# Patient Record
Sex: MALE | Race: WHITE
[De-identification: names, ages, dates, MRNs, and addresses within clinical notes are randomized per-mention and may not be internally consistent; named-entity substitution may affect disease eponyms.]

---

## 2018-10-23 ENCOUNTER — HOSPITAL ENCOUNTER (OUTPATIENT)
Age: 68
End: 2018-10-23
Payer: SELF-PAY

## 2018-10-23 DIAGNOSIS — K40.90: Primary | ICD-10-CM

## 2018-10-23 LAB
BACTERIA URNS QL MICRO: (no result) /LPF
COLOR UR: YELLOW
HYALINE CASTS URNS QL MICRO: (no result) #/LPF
MICRO URNS: (no result)
PH UR: 7 [PH] (ref 5–8.5)
SP GR UR: 1.02 (ref 1–1.03)
UROBILINOGEN UR QL: 0.2 EU/DL

## 2018-10-23 PROCEDURE — 81001 URINALYSIS AUTO W/SCOPE: CPT

## 2018-10-23 PROCEDURE — 93005 ELECTROCARDIOGRAM TRACING: CPT

## 2020-02-05 ENCOUNTER — HOSPITAL ENCOUNTER (OUTPATIENT)
Age: 70
End: 2020-02-05
Payer: MEDICARE

## 2020-02-05 DIAGNOSIS — I10: ICD-10-CM

## 2020-02-05 DIAGNOSIS — R00.2: Primary | ICD-10-CM

## 2020-02-05 DIAGNOSIS — Z85.46: ICD-10-CM

## 2020-02-05 DIAGNOSIS — R31.0: ICD-10-CM

## 2020-02-05 LAB
ALBUMIN LEVEL: 3.4 GM/DL (ref 3.4–5)
ALBUMIN/GLOB SERPL: 1.1 {RATIO} (ref 1.1–1.8)
ALP ISO SERPL-ACNC: 120 U/L (ref 46–116)
ALT SERPLBLD-CCNC: 26 U/L (ref 12–78)
ANION GAP SERPL CALC-SCNC: 12.6 MEQ/L (ref 5–15)
AST SERPL QL: 22 U/L (ref 15–37)
BILIRUBIN,TOTAL: 0.5 MG/DL (ref 0.2–1)
BUN SERPL-MCNC: 16 MG/DL (ref 7–18)
CALCIUM SPEC-MCNC: 8.1 MG/DL (ref 8.5–10.1)
CHLORIDE SPEC-SCNC: 104 MMOL/L (ref 98–107)
CHOLEST SPEC-SCNC: 216 MG/DL (ref 140–200)
CO2 SERPL-SCNC: 29 MMOL/L (ref 21–32)
CREAT BLD-SCNC: 1.1 MG/DL (ref 0.7–1.3)
ESTIMATED GLOMERULAR FILT RATE: 67 ML/MIN (ref 60–?)
GFR (AFRICAN AMERICAN): 81 ML/MIN (ref 60–?)
GLOBULIN SER CALC-MCNC: 3.1 GM/DL (ref 1.3–3.2)
GLUCOSE: 79 MG/DL (ref 74–106)
HCT VFR BLD CALC: 41.6 % (ref 42–52)
HDLC SERPL-MCNC: 60 MG/DL (ref 27–67)
HGB BLD-MCNC: 14.2 G/DL (ref 14.1–18)
MCHC RBC-ENTMCNC: 34.1 G/DL (ref 31.8–35.4)
MCV RBC: 89.5 FL (ref 80–94)
MEAN CORPUSCULAR HEMOGLOBIN: 30.5 PG (ref 27–31.2)
PLATELET # BLD: 250 K/MM3 (ref 142–424)
POTASSIUM: 3.6 MMOL/L (ref 3.5–5.1)
PROSTATE SPECIFIC AG, DIAGNOST: 0.16 NG/ML (ref 0–4)
PROT SERPL-MCNC: 6.5 GM/DL (ref 6.4–8.2)
RBC # BLD AUTO: 4.65 M/MM3 (ref 4.6–6.2)
SODIUM SPEC-SCNC: 142 MMOL/L (ref 136–145)
TRIGLYCERIDES: 63 MG/DL (ref 30–200)
TSH SERPL-ACNC: 2.51 UIU/ML (ref 0.36–3.74)
WBC # BLD AUTO: 5.9 K/MM3 (ref 4.8–10.8)

## 2020-02-05 PROCEDURE — 80053 COMPREHEN METABOLIC PANEL: CPT

## 2020-02-05 PROCEDURE — 84153 ASSAY OF PSA TOTAL: CPT

## 2020-02-05 PROCEDURE — 84443 ASSAY THYROID STIM HORMONE: CPT

## 2020-02-05 PROCEDURE — 80061 LIPID PANEL: CPT

## 2020-02-05 PROCEDURE — 36415 COLL VENOUS BLD VENIPUNCTURE: CPT

## 2020-02-05 PROCEDURE — 85025 COMPLETE CBC W/AUTO DIFF WBC: CPT

## 2021-06-11 ENCOUNTER — HOSPITAL ENCOUNTER (EMERGENCY)
Age: 71
Discharge: HOME | End: 2021-06-11
Payer: MEDICARE

## 2021-06-11 VITALS
DIASTOLIC BLOOD PRESSURE: 77 MMHG | OXYGEN SATURATION: 98 % | SYSTOLIC BLOOD PRESSURE: 135 MMHG | TEMPERATURE: 98.2 F | RESPIRATION RATE: 14 BRPM | HEART RATE: 62 BPM

## 2021-06-11 VITALS
RESPIRATION RATE: 18 BRPM | TEMPERATURE: 98.24 F | DIASTOLIC BLOOD PRESSURE: 65 MMHG | HEART RATE: 65 BPM | SYSTOLIC BLOOD PRESSURE: 115 MMHG

## 2021-06-11 VITALS — BODY MASS INDEX: 27.8 KG/M2
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
## 2021-06-11 DIAGNOSIS — T15.92XA: Primary | ICD-10-CM
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
## 2021-06-11 DIAGNOSIS — I10: ICD-10-CM

## 2021-06-11 PROCEDURE — 65205 REMOVE FOREIGN BODY FROM EYE: CPT

## 2021-06-11 PROCEDURE — 99282 EMERGENCY DEPT VISIT SF MDM: CPT

## 2021-11-02 ENCOUNTER — HOSPITAL ENCOUNTER (OUTPATIENT)
Age: 71
End: 2021-11-02
Payer: MEDICARE

## 2021-11-02 DIAGNOSIS — I10: Primary | ICD-10-CM

## 2021-11-02 DIAGNOSIS — E78.5: ICD-10-CM

## 2021-11-02 LAB
ALBUMIN LEVEL: 3.7 G/DL (ref 3.5–5)
ALBUMIN/GLOB SERPL: 1.3 {RATIO} (ref 1.1–1.8)
ALP ISO SERPL-ACNC: 109 U/L (ref 38–126)
ALT SERPLBLD-CCNC: 21 U/L (ref 12–78)
ANION GAP SERPL CALC-SCNC: 9.5 MEQ/L (ref 5–15)
AST SERPL QL: 30 U/L (ref 17–59)
BILIRUBIN,TOTAL: 0.5 MG/DL (ref 0.2–1.3)
BUN SERPL-MCNC: 19 MG/DL (ref 9–20)
CALCIUM SPEC-MCNC: 8.8 MG/DL (ref 8.4–10.2)
CHLORIDE SPEC-SCNC: 105 MMOL/L (ref 98–107)
CHOLEST SPEC-SCNC: 150 MG/DL (ref 140–200)
CO2 SERPL-SCNC: 29 MMOL/L (ref 22–30)
CREAT BLD-SCNC: 1 MG/DL (ref 0.66–1.25)
ESTIMATED GLOMERULAR FILT RATE: 74 ML/MIN (ref 60–?)
GFR (AFRICAN AMERICAN): 90 ML/MIN (ref 60–?)
GLOBULIN SER CALC-MCNC: 2.8 G/DL (ref 1.3–3.2)
GLUCOSE: 82 MG/DL (ref 74–100)
HDLC SERPL-MCNC: 56 MG/DL (ref 40–60)
POTASSIUM: 4.5 MMOL/L (ref 3.5–5.1)
PROT SERPL-MCNC: 6.5 G/DL (ref 6.3–8.2)
SODIUM SPEC-SCNC: 139 MMOL/L (ref 136–145)
TRIGLYCERIDES: 77 MG/DL (ref 30–150)

## 2021-11-02 PROCEDURE — 80053 COMPREHEN METABOLIC PANEL: CPT

## 2021-11-02 PROCEDURE — 36415 COLL VENOUS BLD VENIPUNCTURE: CPT

## 2021-11-02 PROCEDURE — 80061 LIPID PANEL: CPT

## 2024-06-09 ENCOUNTER — HOSPITAL ENCOUNTER (INPATIENT)
Facility: HOSPITAL | Age: 74
LOS: 6 days | Discharge: HOME | DRG: 871 | End: 2024-06-16
Attending: INTERNAL MEDICINE | Admitting: INTERNAL MEDICINE
Payer: MEDICARE

## 2024-06-09 ENCOUNTER — APPOINTMENT (OUTPATIENT)
Dept: CT IMAGING | Facility: HOSPITAL | Age: 74
DRG: 871 | End: 2024-06-09
Payer: MEDICARE

## 2024-06-09 DIAGNOSIS — I48.91 ATRIAL FIBRILLATION WITH RVR: Primary | ICD-10-CM

## 2024-06-09 DIAGNOSIS — R09.02 HYPOXIA: ICD-10-CM

## 2024-06-09 DIAGNOSIS — J18.9 PNEUMONIA OF LEFT LOWER LOBE DUE TO INFECTIOUS ORGANISM: ICD-10-CM

## 2024-06-09 DIAGNOSIS — N17.9 AKI (ACUTE KIDNEY INJURY): ICD-10-CM

## 2024-06-09 DIAGNOSIS — A41.9 SEPSIS, DUE TO UNSPECIFIED ORGANISM, UNSPECIFIED WHETHER ACUTE ORGAN DYSFUNCTION PRESENT: ICD-10-CM

## 2024-06-09 LAB
ALBUMIN SERPL-MCNC: 3.8 G/DL (ref 3.5–5.2)
ALBUMIN/GLOB SERPL: 1.1 G/DL
ALP SERPL-CCNC: 111 U/L (ref 39–117)
ALT SERPL W P-5'-P-CCNC: 14 U/L (ref 1–41)
ANION GAP SERPL CALCULATED.3IONS-SCNC: 12 MMOL/L (ref 5–15)
AST SERPL-CCNC: 19 U/L (ref 1–40)
BACTERIA UR QL AUTO: ABNORMAL /HPF
BASOPHILS # BLD MANUAL: 0 10*3/MM3 (ref 0–0.2)
BASOPHILS NFR BLD MANUAL: 0 % (ref 0–1.5)
BILIRUB SERPL-MCNC: 1.6 MG/DL (ref 0–1.2)
BILIRUB UR QL STRIP: NEGATIVE
BUN SERPL-MCNC: 35 MG/DL (ref 8–23)
BUN/CREAT SERPL: 25 (ref 7–25)
BURR CELLS BLD QL SMEAR: ABNORMAL
CALCIUM SPEC-SCNC: 8.8 MG/DL (ref 8.6–10.5)
CHLORIDE SERPL-SCNC: 99 MMOL/L (ref 98–107)
CLARITY UR: CLEAR
CO2 SERPL-SCNC: 23 MMOL/L (ref 22–29)
COARSE GRAN CASTS URNS QL MICRO: ABNORMAL /LPF
COLOR UR: ABNORMAL
CREAT SERPL-MCNC: 1.4 MG/DL (ref 0.76–1.27)
D-LACTATE SERPL-SCNC: 3.3 MMOL/L (ref 0.5–2)
DEPRECATED RDW RBC AUTO: 47.8 FL (ref 37–54)
DOHLE BOD BLD QL SMEAR: PRESENT
EGFRCR SERPLBLD CKD-EPI 2021: 53.7 ML/MIN/1.73
EOSINOPHIL # BLD MANUAL: 0 10*3/MM3 (ref 0–0.4)
EOSINOPHIL NFR BLD MANUAL: 0 % (ref 0.3–6.2)
ERYTHROCYTE [DISTWIDTH] IN BLOOD BY AUTOMATED COUNT: 14.1 % (ref 12.3–15.4)
GLOBULIN UR ELPH-MCNC: 3.5 GM/DL
GLUCOSE SERPL-MCNC: 122 MG/DL (ref 65–99)
GLUCOSE UR STRIP-MCNC: NEGATIVE MG/DL
HCT VFR BLD AUTO: 39.5 % (ref 37.5–51)
HGB BLD-MCNC: 13.1 G/DL (ref 13–17.7)
HGB UR QL STRIP.AUTO: ABNORMAL
HOLD SPECIMEN: NORMAL
HYALINE CASTS UR QL AUTO: ABNORMAL /LPF
KETONES UR QL STRIP: ABNORMAL
LEUKOCYTE ESTERASE UR QL STRIP.AUTO: ABNORMAL
LIPASE SERPL-CCNC: 20 U/L (ref 13–60)
LYMPHOCYTES # BLD MANUAL: 1 10*3/MM3 (ref 0.7–3.1)
LYMPHOCYTES NFR BLD MANUAL: 4 % (ref 5–12)
MCH RBC QN AUTO: 30.8 PG (ref 26.6–33)
MCHC RBC AUTO-ENTMCNC: 33.2 G/DL (ref 31.5–35.7)
MCV RBC AUTO: 92.7 FL (ref 79–97)
MONOCYTES # BLD: 1 10*3/MM3 (ref 0.1–0.9)
NEUTROPHILS # BLD AUTO: 22.94 10*3/MM3 (ref 1.7–7)
NEUTROPHILS NFR BLD MANUAL: 76.2 % (ref 42.7–76)
NEUTS BAND NFR BLD MANUAL: 15.8 % (ref 0–5)
NEUTS VAC BLD QL SMEAR: ABNORMAL
NITRITE UR QL STRIP: NEGATIVE
NRBC BLD AUTO-RTO: 0 /100 WBC (ref 0–0.2)
PH UR STRIP.AUTO: 5.5 [PH] (ref 5–8)
PLAT MORPH BLD: NORMAL
PLATELET # BLD AUTO: 200 10*3/MM3 (ref 140–450)
PMV BLD AUTO: 11.1 FL (ref 6–12)
POIKILOCYTOSIS BLD QL SMEAR: ABNORMAL
POLYCHROMASIA BLD QL SMEAR: ABNORMAL
POTASSIUM SERPL-SCNC: 4.6 MMOL/L (ref 3.5–5.2)
PROCALCITONIN SERPL-MCNC: 11.96 NG/ML (ref 0–0.25)
PROT SERPL-MCNC: 7.3 G/DL (ref 6–8.5)
PROT UR QL STRIP: ABNORMAL
RBC # BLD AUTO: 4.26 10*6/MM3 (ref 4.14–5.8)
RBC # UR STRIP: ABNORMAL /HPF
REF LAB TEST METHOD: ABNORMAL
SODIUM SERPL-SCNC: 134 MMOL/L (ref 136–145)
SP GR UR STRIP: 1.02 (ref 1–1.03)
SQUAMOUS #/AREA URNS HPF: ABNORMAL /HPF
UROBILINOGEN UR QL STRIP: ABNORMAL
VARIANT LYMPHS NFR BLD MANUAL: 1 % (ref 0–5)
VARIANT LYMPHS NFR BLD MANUAL: 3 % (ref 19.6–45.3)
WBC # UR STRIP: ABNORMAL /HPF
WBC NRBC COR # BLD AUTO: 24.91 10*3/MM3 (ref 3.4–10.8)
WHOLE BLOOD HOLD COAG: NORMAL
WHOLE BLOOD HOLD SPECIMEN: NORMAL

## 2024-06-09 PROCEDURE — 93010 ELECTROCARDIOGRAM REPORT: CPT | Performed by: INTERNAL MEDICINE

## 2024-06-09 PROCEDURE — 84145 PROCALCITONIN (PCT): CPT | Performed by: PHYSICIAN ASSISTANT

## 2024-06-09 PROCEDURE — 85007 BL SMEAR W/DIFF WBC COUNT: CPT | Performed by: EMERGENCY MEDICINE

## 2024-06-09 PROCEDURE — 25010000002 ONDANSETRON PER 1 MG: Performed by: PHYSICIAN ASSISTANT

## 2024-06-09 PROCEDURE — 25010000002 MORPHINE PER 10 MG: Performed by: EMERGENCY MEDICINE

## 2024-06-09 PROCEDURE — 99285 EMERGENCY DEPT VISIT HI MDM: CPT

## 2024-06-09 PROCEDURE — 25810000003 SODIUM CHLORIDE 0.9 % SOLUTION: Performed by: PHYSICIAN ASSISTANT

## 2024-06-09 PROCEDURE — 81001 URINALYSIS AUTO W/SCOPE: CPT | Performed by: EMERGENCY MEDICINE

## 2024-06-09 PROCEDURE — 93005 ELECTROCARDIOGRAM TRACING: CPT | Performed by: FAMILY MEDICINE

## 2024-06-09 PROCEDURE — 25510000001 IOPAMIDOL 61 % SOLUTION: Performed by: PHYSICIAN ASSISTANT

## 2024-06-09 PROCEDURE — 85025 COMPLETE CBC W/AUTO DIFF WBC: CPT | Performed by: EMERGENCY MEDICINE

## 2024-06-09 PROCEDURE — 74177 CT ABD & PELVIS W/CONTRAST: CPT

## 2024-06-09 PROCEDURE — 93005 ELECTROCARDIOGRAM TRACING: CPT | Performed by: EMERGENCY MEDICINE

## 2024-06-09 PROCEDURE — 80053 COMPREHEN METABOLIC PANEL: CPT | Performed by: EMERGENCY MEDICINE

## 2024-06-09 PROCEDURE — 83605 ASSAY OF LACTIC ACID: CPT | Performed by: EMERGENCY MEDICINE

## 2024-06-09 PROCEDURE — 83690 ASSAY OF LIPASE: CPT | Performed by: EMERGENCY MEDICINE

## 2024-06-09 RX ORDER — DILTIAZEM HYDROCHLORIDE 5 MG/ML
0.25 INJECTION INTRAVENOUS ONCE
Status: COMPLETED | OUTPATIENT
Start: 2024-06-09 | End: 2024-06-09

## 2024-06-09 RX ORDER — SODIUM CHLORIDE 0.9 % (FLUSH) 0.9 %
10 SYRINGE (ML) INJECTION AS NEEDED
Status: DISCONTINUED | OUTPATIENT
Start: 2024-06-09 | End: 2024-06-14 | Stop reason: SDUPTHER

## 2024-06-09 RX ORDER — FLECAINIDE ACETATE 100 MG/1
100 TABLET ORAL 2 TIMES DAILY
COMMUNITY

## 2024-06-09 RX ORDER — SODIUM CHLORIDE 0.9 % (FLUSH) 0.9 %
10 SYRINGE (ML) INJECTION AS NEEDED
Status: DISCONTINUED | OUTPATIENT
Start: 2024-06-09 | End: 2024-06-16 | Stop reason: HOSPADM

## 2024-06-09 RX ORDER — LISINOPRIL 10 MG/1
10 TABLET ORAL DAILY
COMMUNITY
End: 2024-06-16 | Stop reason: HOSPADM

## 2024-06-09 RX ORDER — ATORVASTATIN CALCIUM 40 MG/1
40 TABLET, FILM COATED ORAL NIGHTLY
COMMUNITY

## 2024-06-09 RX ORDER — ONDANSETRON 2 MG/ML
4 INJECTION INTRAMUSCULAR; INTRAVENOUS ONCE
Status: COMPLETED | OUTPATIENT
Start: 2024-06-09 | End: 2024-06-09

## 2024-06-09 RX ORDER — ACETAMINOPHEN 500 MG
1000 TABLET ORAL ONCE
Status: COMPLETED | OUTPATIENT
Start: 2024-06-09 | End: 2024-06-09

## 2024-06-09 RX ADMIN — ACETAMINOPHEN 1000 MG: 500 TABLET, FILM COATED ORAL at 22:52

## 2024-06-09 RX ADMIN — ONDANSETRON 4 MG: 2 INJECTION INTRAMUSCULAR; INTRAVENOUS at 22:35

## 2024-06-09 RX ADMIN — IOPAMIDOL 100 ML: 612 INJECTION, SOLUTION INTRAVENOUS at 23:34

## 2024-06-09 RX ADMIN — MORPHINE SULFATE 4 MG: 4 INJECTION, SOLUTION INTRAMUSCULAR; INTRAVENOUS at 22:35

## 2024-06-09 RX ADMIN — DILTIAZEM HYDROCHLORIDE 24.4 MG: 5 INJECTION INTRAVENOUS at 23:08

## 2024-06-09 RX ADMIN — SODIUM CHLORIDE 1000 ML: 9 INJECTION, SOLUTION INTRAVENOUS at 22:39

## 2024-06-10 ENCOUNTER — APPOINTMENT (OUTPATIENT)
Dept: CT IMAGING | Facility: HOSPITAL | Age: 74
DRG: 871 | End: 2024-06-10
Payer: MEDICARE

## 2024-06-10 PROBLEM — R93.5 ABNORMAL CT OF THE ABDOMEN: Status: ACTIVE | Noted: 2024-06-10

## 2024-06-10 PROBLEM — A41.9 SEPSIS: Status: ACTIVE | Noted: 2024-06-10

## 2024-06-10 PROBLEM — J18.9 PNEUMONIA INVOLVING LEFT LUNG: Status: ACTIVE | Noted: 2024-06-10

## 2024-06-10 PROBLEM — J96.01 ACUTE RESPIRATORY FAILURE WITH HYPOXIA: Status: ACTIVE | Noted: 2024-06-10

## 2024-06-10 PROBLEM — I48.20 CHRONIC ATRIAL FIBRILLATION WITH RAPID VENTRICULAR RESPONSE: Status: ACTIVE | Noted: 2024-06-10

## 2024-06-10 LAB
ANION GAP SERPL CALCULATED.3IONS-SCNC: 11 MMOL/L (ref 5–15)
B PARAPERT DNA SPEC QL NAA+PROBE: NOT DETECTED
B PERT DNA SPEC QL NAA+PROBE: NOT DETECTED
BACTERIA UR QL AUTO: ABNORMAL /HPF
BASOPHILS # BLD MANUAL: 0.2 10*3/MM3 (ref 0–0.2)
BASOPHILS NFR BLD MANUAL: 1 % (ref 0–1.5)
BILIRUB UR QL STRIP: NEGATIVE
BUN SERPL-MCNC: 33 MG/DL (ref 8–23)
BUN/CREAT SERPL: 25.2 (ref 7–25)
BURR CELLS BLD QL SMEAR: ABNORMAL
C PNEUM DNA NPH QL NAA+NON-PROBE: NOT DETECTED
CALCIUM SPEC-SCNC: 7.6 MG/DL (ref 8.6–10.5)
CHLORIDE SERPL-SCNC: 102 MMOL/L (ref 98–107)
CLARITY UR: CLEAR
CO2 SERPL-SCNC: 21 MMOL/L (ref 22–29)
COLOR UR: ABNORMAL
CREAT SERPL-MCNC: 1.31 MG/DL (ref 0.76–1.27)
D-LACTATE SERPL-SCNC: 2 MMOL/L (ref 0.5–2)
DEPRECATED RDW RBC AUTO: 47.8 FL (ref 37–54)
DOHLE BOD BLD QL SMEAR: PRESENT
EGFRCR SERPLBLD CKD-EPI 2021: 58.2 ML/MIN/1.73
EOSINOPHIL # BLD MANUAL: 0 10*3/MM3 (ref 0–0.4)
EOSINOPHIL NFR BLD MANUAL: 0 % (ref 0.3–6.2)
ERYTHROCYTE [DISTWIDTH] IN BLOOD BY AUTOMATED COUNT: 13.9 % (ref 12.3–15.4)
FLUAV SUBTYP SPEC NAA+PROBE: NOT DETECTED
FLUBV RNA ISLT QL NAA+PROBE: NOT DETECTED
GEN 5 2HR TROPONIN T REFLEX: 18 NG/L
GLUCOSE SERPL-MCNC: 122 MG/DL (ref 65–99)
GLUCOSE UR STRIP-MCNC: NEGATIVE MG/DL
GRAN CASTS URNS QL MICRO: ABNORMAL /LPF
HADV DNA SPEC NAA+PROBE: NOT DETECTED
HCOV 229E RNA SPEC QL NAA+PROBE: NOT DETECTED
HCOV HKU1 RNA SPEC QL NAA+PROBE: NOT DETECTED
HCOV NL63 RNA SPEC QL NAA+PROBE: NOT DETECTED
HCOV OC43 RNA SPEC QL NAA+PROBE: NOT DETECTED
HCT VFR BLD AUTO: 37.1 % (ref 37.5–51)
HGB BLD-MCNC: 12.2 G/DL (ref 13–17.7)
HGB UR QL STRIP.AUTO: ABNORMAL
HMPV RNA NPH QL NAA+NON-PROBE: NOT DETECTED
HPIV1 RNA ISLT QL NAA+PROBE: NOT DETECTED
HPIV2 RNA SPEC QL NAA+PROBE: NOT DETECTED
HPIV3 RNA NPH QL NAA+PROBE: NOT DETECTED
HPIV4 P GENE NPH QL NAA+PROBE: NOT DETECTED
HYALINE CASTS UR QL AUTO: ABNORMAL /LPF
INR PPP: 1.66 (ref 0.91–1.09)
KETONES UR QL STRIP: NEGATIVE
L PNEUMO1 AG UR QL IA: NEGATIVE
LEUKOCYTE ESTERASE UR QL STRIP.AUTO: NEGATIVE
LYMPHOCYTES # BLD MANUAL: 0.78 10*3/MM3 (ref 0.7–3.1)
LYMPHOCYTES NFR BLD MANUAL: 2 % (ref 5–12)
M PNEUMO IGG SER IA-ACNC: NOT DETECTED
MAGNESIUM SERPL-MCNC: 1.5 MG/DL (ref 1.6–2.4)
MCH RBC QN AUTO: 30.6 PG (ref 26.6–33)
MCHC RBC AUTO-ENTMCNC: 32.9 G/DL (ref 31.5–35.7)
MCV RBC AUTO: 93 FL (ref 79–97)
MONOCYTES # BLD: 0.39 10*3/MM3 (ref 0.1–0.9)
MRSA DNA SPEC QL NAA+PROBE: NORMAL
NEUTROPHILS # BLD AUTO: 18.23 10*3/MM3 (ref 1.7–7)
NEUTROPHILS NFR BLD MANUAL: 71 % (ref 42.7–76)
NEUTS BAND NFR BLD MANUAL: 22 % (ref 0–5)
NEUTS VAC BLD QL SMEAR: ABNORMAL
NITRITE UR QL STRIP: NEGATIVE
NRBC BLD AUTO-RTO: 0 /100 WBC (ref 0–0.2)
PH UR STRIP.AUTO: 5.5 [PH] (ref 5–8)
PHOSPHATE SERPL-MCNC: 2.5 MG/DL (ref 2.5–4.5)
PLAT MORPH BLD: NORMAL
PLATELET # BLD AUTO: 175 10*3/MM3 (ref 140–450)
PMV BLD AUTO: 11 FL (ref 6–12)
POIKILOCYTOSIS BLD QL SMEAR: ABNORMAL
POTASSIUM SERPL-SCNC: 4.3 MMOL/L (ref 3.5–5.2)
PROT UR QL STRIP: ABNORMAL
PROTHROMBIN TIME: 20.2 SECONDS (ref 11.8–14.8)
QT INTERVAL: 218 MS
QT INTERVAL: 364 MS
QTC INTERVAL: 374 MS
QTC INTERVAL: 419 MS
RBC # BLD AUTO: 3.99 10*6/MM3 (ref 4.14–5.8)
RBC # UR STRIP: ABNORMAL /HPF
REF LAB TEST METHOD: ABNORMAL
RHINOVIRUS RNA SPEC NAA+PROBE: NOT DETECTED
RSV RNA NPH QL NAA+NON-PROBE: NOT DETECTED
SARS-COV-2 RNA NPH QL NAA+NON-PROBE: NOT DETECTED
SODIUM SERPL-SCNC: 134 MMOL/L (ref 136–145)
SP GR UR STRIP: >1.03 (ref 1–1.03)
SQUAMOUS #/AREA URNS HPF: ABNORMAL /HPF
TROPONIN T DELTA: 1 NG/L
TROPONIN T SERPL HS-MCNC: 17 NG/L
UROBILINOGEN UR QL STRIP: ABNORMAL
VARIANT LYMPHS NFR BLD MANUAL: 4 % (ref 19.6–45.3)
WBC # UR STRIP: ABNORMAL /HPF
WBC NRBC COR # BLD AUTO: 19.6 10*3/MM3 (ref 3.4–10.8)

## 2024-06-10 PROCEDURE — 94799 UNLISTED PULMONARY SVC/PX: CPT

## 2024-06-10 PROCEDURE — 25010000002 PIPERACILLIN SOD-TAZOBACTAM PER 1 G: Performed by: PHYSICIAN ASSISTANT

## 2024-06-10 PROCEDURE — 25810000003 SODIUM CHLORIDE 0.9 % SOLUTION: Performed by: EMERGENCY MEDICINE

## 2024-06-10 PROCEDURE — 85610 PROTHROMBIN TIME: CPT | Performed by: NURSE PRACTITIONER

## 2024-06-10 PROCEDURE — 25810000003 SODIUM CHLORIDE 0.9 % SOLUTION 250 ML FLEX CONT: Performed by: NURSE PRACTITIONER

## 2024-06-10 PROCEDURE — 36415 COLL VENOUS BLD VENIPUNCTURE: CPT | Performed by: NURSE PRACTITIONER

## 2024-06-10 PROCEDURE — 25810000003 SODIUM CHLORIDE 0.9 % SOLUTION: Performed by: PHYSICIAN ASSISTANT

## 2024-06-10 PROCEDURE — 99221 1ST HOSP IP/OBS SF/LOW 40: CPT | Performed by: UROLOGY

## 2024-06-10 PROCEDURE — 81001 URINALYSIS AUTO W/SCOPE: CPT | Performed by: NURSE PRACTITIONER

## 2024-06-10 PROCEDURE — 87205 SMEAR GRAM STAIN: CPT | Performed by: NURSE PRACTITIONER

## 2024-06-10 PROCEDURE — 83605 ASSAY OF LACTIC ACID: CPT | Performed by: INTERNAL MEDICINE

## 2024-06-10 PROCEDURE — 25010000002 AZITHROMYCIN PER 500 MG: Performed by: NURSE PRACTITIONER

## 2024-06-10 PROCEDURE — 84484 ASSAY OF TROPONIN QUANT: CPT | Performed by: NURSE PRACTITIONER

## 2024-06-10 PROCEDURE — 84100 ASSAY OF PHOSPHORUS: CPT | Performed by: NURSE PRACTITIONER

## 2024-06-10 PROCEDURE — 25810000003 LACTATED RINGERS PER 1000 ML: Performed by: NURSE PRACTITIONER

## 2024-06-10 PROCEDURE — 85007 BL SMEAR W/DIFF WBC COUNT: CPT | Performed by: NURSE PRACTITIONER

## 2024-06-10 PROCEDURE — 85025 COMPLETE CBC W/AUTO DIFF WBC: CPT | Performed by: NURSE PRACTITIONER

## 2024-06-10 PROCEDURE — 87070 CULTURE OTHR SPECIMN AEROBIC: CPT | Performed by: NURSE PRACTITIONER

## 2024-06-10 PROCEDURE — 94761 N-INVAS EAR/PLS OXIMETRY MLT: CPT

## 2024-06-10 PROCEDURE — 0202U NFCT DS 22 TRGT SARS-COV-2: CPT | Performed by: NURSE PRACTITIONER

## 2024-06-10 PROCEDURE — 87449 NOS EACH ORGANISM AG IA: CPT | Performed by: NURSE PRACTITIONER

## 2024-06-10 PROCEDURE — 25010000002 MAGNESIUM SULFATE 2 GM/50ML SOLUTION: Performed by: NURSE PRACTITIONER

## 2024-06-10 PROCEDURE — 87641 MR-STAPH DNA AMP PROBE: CPT | Performed by: NURSE PRACTITIONER

## 2024-06-10 PROCEDURE — 25010000002 CEFTRIAXONE PER 250 MG: Performed by: NURSE PRACTITIONER

## 2024-06-10 PROCEDURE — 83735 ASSAY OF MAGNESIUM: CPT | Performed by: NURSE PRACTITIONER

## 2024-06-10 PROCEDURE — 71250 CT THORAX DX C-: CPT

## 2024-06-10 PROCEDURE — 25010000002 VANCOMYCIN 10 G RECONSTITUTED SOLUTION: Performed by: PHYSICIAN ASSISTANT

## 2024-06-10 PROCEDURE — 87040 BLOOD CULTURE FOR BACTERIA: CPT | Performed by: PHYSICIAN ASSISTANT

## 2024-06-10 PROCEDURE — 80048 BASIC METABOLIC PNL TOTAL CA: CPT | Performed by: NURSE PRACTITIONER

## 2024-06-10 RX ORDER — ACETAMINOPHEN 325 MG/1
650 TABLET ORAL EVERY 4 HOURS PRN
Status: DISCONTINUED | OUTPATIENT
Start: 2024-06-10 | End: 2024-06-16 | Stop reason: HOSPADM

## 2024-06-10 RX ORDER — VANCOMYCIN 2 GRAM/500 ML IN 0.9 % SODIUM CHLORIDE INTRAVENOUS
20 ONCE
Status: COMPLETED | OUTPATIENT
Start: 2024-06-10 | End: 2024-06-10

## 2024-06-10 RX ORDER — ONDANSETRON 2 MG/ML
4 INJECTION INTRAMUSCULAR; INTRAVENOUS EVERY 6 HOURS PRN
Status: DISCONTINUED | OUTPATIENT
Start: 2024-06-10 | End: 2024-06-16 | Stop reason: HOSPADM

## 2024-06-10 RX ORDER — BISACODYL 10 MG
10 SUPPOSITORY, RECTAL RECTAL DAILY PRN
Status: DISCONTINUED | OUTPATIENT
Start: 2024-06-10 | End: 2024-06-16 | Stop reason: HOSPADM

## 2024-06-10 RX ORDER — MAGNESIUM SULFATE HEPTAHYDRATE 40 MG/ML
2 INJECTION, SOLUTION INTRAVENOUS
Status: COMPLETED | OUTPATIENT
Start: 2024-06-10 | End: 2024-06-10

## 2024-06-10 RX ORDER — UBIDECARENONE 100 MG
100 CAPSULE ORAL DAILY
COMMUNITY
End: 2024-06-16 | Stop reason: HOSPADM

## 2024-06-10 RX ORDER — ASCORBIC ACID 500 MG
500 TABLET ORAL DAILY
COMMUNITY

## 2024-06-10 RX ORDER — SODIUM CHLORIDE, SODIUM LACTATE, POTASSIUM CHLORIDE, CALCIUM CHLORIDE 600; 310; 30; 20 MG/100ML; MG/100ML; MG/100ML; MG/100ML
100 INJECTION, SOLUTION INTRAVENOUS CONTINUOUS
Status: DISCONTINUED | OUTPATIENT
Start: 2024-06-10 | End: 2024-06-14

## 2024-06-10 RX ORDER — SODIUM PHOSPHATE,MONO-DIBASIC 19G-7G/118
1 ENEMA (ML) RECTAL 2 TIMES DAILY WITH MEALS
COMMUNITY
End: 2024-06-16 | Stop reason: HOSPADM

## 2024-06-10 RX ORDER — MELATONIN
1000 DAILY
COMMUNITY

## 2024-06-10 RX ORDER — NITROGLYCERIN 0.4 MG/1
0.4 TABLET SUBLINGUAL
Status: DISCONTINUED | OUTPATIENT
Start: 2024-06-10 | End: 2024-06-16 | Stop reason: HOSPADM

## 2024-06-10 RX ORDER — CHLORHEXIDINE GLUCONATE 500 MG/1
1 CLOTH TOPICAL ONCE
Status: COMPLETED | OUTPATIENT
Start: 2024-06-10 | End: 2024-06-10

## 2024-06-10 RX ORDER — VANCOMYCIN/0.9 % SOD CHLORIDE 1.5G/250ML
1500 PLASTIC BAG, INJECTION (ML) INTRAVENOUS EVERY 24 HOURS
Status: DISCONTINUED | OUTPATIENT
Start: 2024-06-10 | End: 2024-06-10

## 2024-06-10 RX ORDER — ACETAMINOPHEN 650 MG/1
650 SUPPOSITORY RECTAL EVERY 4 HOURS PRN
Status: DISCONTINUED | OUTPATIENT
Start: 2024-06-10 | End: 2024-06-16 | Stop reason: HOSPADM

## 2024-06-10 RX ORDER — METOPROLOL TARTRATE 50 MG/1
50 TABLET, FILM COATED ORAL EVERY 12 HOURS SCHEDULED
Status: DISCONTINUED | OUTPATIENT
Start: 2024-06-10 | End: 2024-06-12

## 2024-06-10 RX ORDER — SODIUM CHLORIDE 0.9 % (FLUSH) 0.9 %
10 SYRINGE (ML) INJECTION AS NEEDED
Status: DISCONTINUED | OUTPATIENT
Start: 2024-06-10 | End: 2024-06-14 | Stop reason: SDUPTHER

## 2024-06-10 RX ORDER — BISACODYL 5 MG/1
5 TABLET, DELAYED RELEASE ORAL DAILY PRN
Status: DISCONTINUED | OUTPATIENT
Start: 2024-06-10 | End: 2024-06-16 | Stop reason: HOSPADM

## 2024-06-10 RX ORDER — DILTIAZEM HCL/D5W 125 MG/125
5-15 PLASTIC BAG, INJECTION (ML) INTRAVENOUS
Status: DISCONTINUED | OUTPATIENT
Start: 2024-06-10 | End: 2024-06-13

## 2024-06-10 RX ORDER — SODIUM CHLORIDE 0.9 % (FLUSH) 0.9 %
10 SYRINGE (ML) INJECTION EVERY 12 HOURS SCHEDULED
Status: DISCONTINUED | OUTPATIENT
Start: 2024-06-10 | End: 2024-06-16 | Stop reason: HOSPADM

## 2024-06-10 RX ORDER — AMOXICILLIN 250 MG
2 CAPSULE ORAL 2 TIMES DAILY
Status: DISCONTINUED | OUTPATIENT
Start: 2024-06-10 | End: 2024-06-16 | Stop reason: HOSPADM

## 2024-06-10 RX ORDER — SODIUM CHLORIDE 9 MG/ML
40 INJECTION, SOLUTION INTRAVENOUS AS NEEDED
Status: DISCONTINUED | OUTPATIENT
Start: 2024-06-10 | End: 2024-06-16 | Stop reason: HOSPADM

## 2024-06-10 RX ORDER — METOPROLOL TARTRATE 50 MG/1
50 TABLET, FILM COATED ORAL ONCE
Status: COMPLETED | OUTPATIENT
Start: 2024-06-10 | End: 2024-06-10

## 2024-06-10 RX ORDER — ATORVASTATIN CALCIUM 40 MG/1
40 TABLET, FILM COATED ORAL NIGHTLY
Status: DISCONTINUED | OUTPATIENT
Start: 2024-06-10 | End: 2024-06-16 | Stop reason: HOSPADM

## 2024-06-10 RX ORDER — PANTOPRAZOLE SODIUM 40 MG/1
40 TABLET, DELAYED RELEASE ORAL
Status: DISCONTINUED | OUTPATIENT
Start: 2024-06-10 | End: 2024-06-16 | Stop reason: HOSPADM

## 2024-06-10 RX ORDER — POLYETHYLENE GLYCOL 3350 17 G/17G
17 POWDER, FOR SOLUTION ORAL DAILY PRN
Status: DISCONTINUED | OUTPATIENT
Start: 2024-06-10 | End: 2024-06-16 | Stop reason: HOSPADM

## 2024-06-10 RX ORDER — CHLORHEXIDINE GLUCONATE 500 MG/1
1 CLOTH TOPICAL EVERY 24 HOURS
Status: DISCONTINUED | OUTPATIENT
Start: 2024-06-11 | End: 2024-06-13 | Stop reason: HOSPADM

## 2024-06-10 RX ADMIN — MAGNESIUM SULFATE HEPTAHYDRATE 2 G: 2 INJECTION, SOLUTION INTRAVENOUS at 04:42

## 2024-06-10 RX ADMIN — MAGNESIUM SULFATE HEPTAHYDRATE 2 G: 2 INJECTION, SOLUTION INTRAVENOUS at 06:36

## 2024-06-10 RX ADMIN — MAGNESIUM SULFATE HEPTAHYDRATE 2 G: 2 INJECTION, SOLUTION INTRAVENOUS at 09:05

## 2024-06-10 RX ADMIN — METOPROLOL TARTRATE 50 MG: 50 TABLET, FILM COATED ORAL at 20:02

## 2024-06-10 RX ADMIN — Medication 10 ML: at 20:02

## 2024-06-10 RX ADMIN — Medication 10 ML: at 09:06

## 2024-06-10 RX ADMIN — Medication 1 APPLICATION: at 09:05

## 2024-06-10 RX ADMIN — ATORVASTATIN CALCIUM 40 MG: 40 TABLET ORAL at 20:01

## 2024-06-10 RX ADMIN — Medication 1 APPLICATION: at 20:02

## 2024-06-10 RX ADMIN — SODIUM CHLORIDE, POTASSIUM CHLORIDE, SODIUM LACTATE AND CALCIUM CHLORIDE 100 ML/HR: 600; 310; 30; 20 INJECTION, SOLUTION INTRAVENOUS at 06:37

## 2024-06-10 RX ADMIN — SENNOSIDES AND DOCUSATE SODIUM 2 TABLET: 50; 8.6 TABLET ORAL at 20:02

## 2024-06-10 RX ADMIN — APIXABAN 5 MG: 5 TABLET, FILM COATED ORAL at 20:01

## 2024-06-10 RX ADMIN — METOPROLOL TARTRATE 50 MG: 50 TABLET, FILM COATED ORAL at 09:06

## 2024-06-10 RX ADMIN — APIXABAN 5 MG: 5 TABLET, FILM COATED ORAL at 09:06

## 2024-06-10 RX ADMIN — CHLORHEXIDINE GLUCONATE 1 APPLICATION: 500 CLOTH TOPICAL at 02:48

## 2024-06-10 RX ADMIN — Medication 1 APPLICATION: at 02:53

## 2024-06-10 RX ADMIN — METOPROLOL TARTRATE 50 MG: 50 TABLET, FILM COATED ORAL at 02:53

## 2024-06-10 RX ADMIN — PIPERACILLIN SODIUM AND TAZOBACTAM SODIUM 3.38 G: 3; .375 INJECTION, POWDER, LYOPHILIZED, FOR SOLUTION INTRAVENOUS at 02:53

## 2024-06-10 RX ADMIN — SODIUM CHLORIDE 2000 MG: 9 INJECTION, SOLUTION INTRAVENOUS at 01:26

## 2024-06-10 RX ADMIN — CEFTRIAXONE SODIUM 2000 MG: 2 INJECTION, POWDER, FOR SOLUTION INTRAMUSCULAR; INTRAVENOUS at 07:50

## 2024-06-10 RX ADMIN — Medication 5 MG/HR: at 00:51

## 2024-06-10 RX ADMIN — AZITHROMYCIN MONOHYDRATE 500 MG: 500 INJECTION, POWDER, LYOPHILIZED, FOR SOLUTION INTRAVENOUS at 09:05

## 2024-06-10 RX ADMIN — PANTOPRAZOLE SODIUM 40 MG: 40 TABLET, DELAYED RELEASE ORAL at 06:00

## 2024-06-10 RX ADMIN — Medication 10 ML: at 02:49

## 2024-06-10 RX ADMIN — SODIUM CHLORIDE 1000 ML: 9 INJECTION, SOLUTION INTRAVENOUS at 00:18

## 2024-06-10 NOTE — ED PROVIDER NOTES
Subjective   History of Present Illness    Patient is a 73-year-old male presenting to ED with left sided abdominal pain, nausea, vomiting.  PMH significant for long-term use Eliquis, history of atrial fibrillation, hypertension, hernia repair, history of previous kidney stones.  Patient states yesterday he developed shaking chills and mild nausea as well as a slight left flank pain.  Patient reports that today his symptoms significantly increased and the pain now feels like with breath that radiates towards his left shoulder blade and is now radiating towards his left lower quadrant.  Patient denies any radiation of the pain into his testicles, right-sided abdominal or flank pain.  Patient denies dysuria, hematuria, or decreased urination.  Patient has continued to have chills today with no fevers or diaphoresis.  Patient started having nausea and vomiting as well as increasing waves of intense pain which she can no longer tolerate for which she presents for further evaluation.  Patient states many decades ago he had a history of 2 episodes of kidney stones which he passed on his own with no lithotripsies or stents and does not currently follow with a urologist.  Patient denies use of any medications prior to arrival and presents at this time for further evaluation.  Patient did state that he is currently visiting a friend in the area.    Records reviewed show patient was last seen outpatient at the PCP office on 4/1/2024 for dyslipidemia, paroxysmal A-fib, chest tightness, hypertension.    No previous ED visits.    No previous abdominal/pelvic imaging.    Review of Systems   Constitutional:  Positive for chills. Negative for diaphoresis and fever.   HENT: Negative.     Eyes: Negative.    Respiratory: Negative.     Cardiovascular: Negative.    Gastrointestinal:  Positive for abdominal pain (LLQ), nausea and vomiting. Negative for constipation and diarrhea.        Denies hematemesis   Genitourinary:  Positive for  flank pain (left). Negative for decreased urine volume, difficulty urinating, dysuria, hematuria and testicular pain.   Skin: Negative.    Neurological: Negative.    Psychiatric/Behavioral: Negative.     All other systems reviewed and are negative.      Past Medical History:   Diagnosis Date    Atrial fibrillation     Hypertension        No Known Allergies    Past Surgical History:   Procedure Laterality Date    HERNIA REPAIR         History reviewed. No pertinent family history.    Social History     Socioeconomic History    Marital status:    Tobacco Use    Smoking status: Never   Substance and Sexual Activity    Alcohol use: Never    Drug use: Never           Objective   Physical Exam  Vitals and nursing note reviewed.   Constitutional:       General: He is in acute distress (Appears to be uncomfortable due to pain, restless and pacing around room).      Appearance: Normal appearance. He is well-developed and well-groomed. He is not ill-appearing, toxic-appearing or diaphoretic.   HENT:      Head: Normocephalic.      Mouth/Throat:      Mouth: Mucous membranes are moist.      Pharynx: Oropharynx is clear.   Eyes:      General: No scleral icterus.     Conjunctiva/sclera: Conjunctivae normal.      Pupils: Pupils are equal, round, and reactive to light.   Cardiovascular:      Rate and Rhythm: Regular rhythm. Tachycardia present.   Pulmonary:      Effort: Pulmonary effort is normal.      Breath sounds: Normal breath sounds.   Abdominal:      General: Bowel sounds are normal. There is no distension.      Palpations: Abdomen is soft.      Tenderness: There is no abdominal tenderness. There is left CVA tenderness. There is no right CVA tenderness or guarding.   Musculoskeletal:         General: Normal range of motion.      Cervical back: Neck supple.      Right lower leg: No edema.      Left lower leg: No edema.   Skin:     General: Skin is warm.      Coloration: Skin is not jaundiced.   Neurological:      Mental  Status: He is alert and oriented to person, place, and time.      Gait: Gait normal.   Psychiatric:         Mood and Affect: Mood normal.         Behavior: Behavior normal. Behavior is cooperative.         Procedures           ED Course                                             Medical Decision Making  Problems Addressed:  KYRA (acute kidney injury): complicated acute illness or injury  Atrial fibrillation with RVR: complicated acute illness or injury  Hypoxia: complicated acute illness or injury  Pneumonia of left lower lobe due to infectious organism: complicated acute illness or injury  Sepsis, due to unspecified organism, unspecified whether acute organ dysfunction present: complicated acute illness or injury    Amount and/or Complexity of Data Reviewed  External Data Reviewed: labs, radiology and notes.  Labs: ordered. Decision-making details documented in ED Course.  Radiology: ordered. Decision-making details documented in ED Course.  ECG/medicine tests: ordered. Decision-making details documented in ED Course.  Discussion of management or test interpretation with external provider(s): Dr. Emile Hernandez (attending)  Amberly PAKR (intensivist)    Risk  OTC drugs.  Prescription drug management.  Decision regarding hospitalization.        Patient is a 73-year-old male presenting to ED with left sided abdominal pain, nausea, vomiting.  PMH significant for long-term use Eliquis, history of atrial fibrillation, hypertension, hernia repair, history of previous kidney stones.  Upon initial evaluation patient is standing and pacing in the room restless due to pain for which he is guarding on his left flank region.  Patient is nontoxic-appearing, nondiaphoretic.  Examination finds reproducible left CVAT with no right CVAT.  Anterior abdomen is soft, nontender, nondistended with normal bowel sounds.  Patient is tachycardic with no other acute abnormal exam findings.  No evidence of jaundice, scleral  icterus.  Normal posterior oropharynx.  Discussed with patient need for workup to include labs, urinalysis, CT imaging and ability to control his pain with IV medications for which she is amenable with no further questions, concerns, needs at this time.    Differential diagnosis: Renal stone, ureteral stone, KYRA, urinary tract infection, pyelonephritis, sepsis, systemic infection, diverticulitis, diverticulosis, colitis, bowel obstruction, constipation, mesenteric ischemia, mesenteric adenitis, other    Lab work revealed leukocytosis of 24.91.  Elevated relative bands at 15.8%, ANC 22.94.  Stable H&H, normal platelets no further CBC abnormalities.  Further concern for systemic process with lactic acid 3.3 for which patient received IV hydration.  Procalcitonin with further concern for systemic bacterial process elevated 11.96.  Blood cultures were obtained.  Low concern for pancreatic abnormality such as pancreatitis with normal lipase.  CMP revealed total bilirubin 1.6, creatinine 1.4 with BUN 35, GFR 53.7.  No electrolyte disturbances otherwise.  No further hepatic dysfunction.  Urinalysis with trace leukocytes, trace bacteria, 3-5 WBC and negative nitrites.  A culture was sent.  CT imaging of the abdomen and pelvis showed: Mild fullness of both renal collecting systems without ureteral stone or hydroureter, findings may reflect baseline, splenic granuloma, small and large bowel are normal, no free air or free fluid is present, dense left lower lobe consolidation compatible with severe pneumonia, small associated parapneumonic effusion suspected.  While in the ED awaiting evaluation patient went into A-fib with RVR for which she was given an initial 0.25 mg/kg dose of diltiazem with no improvement in his heart rate for which she was then started on a drip.  Patient was initially started on vancomycin and Zosyn.  Patient did throughout evaluation also required placement on 2 L of nasal cannula oxygen for which he  has never used home oxygen.  Discussed with patient need for admission for further evaluation and treatment for which she is amenable with no further questions, concerns, or needs at this time.  Case was discussed with Ms. XAVIER Saavedra on-call for intensivist who will come to except patient under admission to the services of Dr. Abraham with request for chest CT without contrast prior to admission.       Final diagnoses:   Atrial fibrillation with RVR   Sepsis, due to unspecified organism, unspecified whether acute organ dysfunction present   KYRA (acute kidney injury)   Pneumonia of left lower lobe due to infectious organism   Hypoxia       ED Disposition  ED Disposition       ED Disposition   Decision to Admit    Condition   --    Comment   Level of Care: Critical Care [6]   Diagnosis: Sepsis [9102166]   Admitting Physician: DENISE ABRAHAM [[id number]]   Certification: I Certify That Inpatient Hospital Services Are Medically Necessary For Greater Than 2 Midnights                 No follow-up provider specified.       Medication List      No changes were made to your prescriptions during this visit.            Nicolas Meraz PA-C  06/10/24 0212

## 2024-06-10 NOTE — CASE MANAGEMENT/SOCIAL WORK
Discharge Planning Assessment  [facility]     Patient Name: Rome Justice  MRN: [id number]  Today's Date: 6/10/2024    Admit Date: 6/9/2024        Discharge Needs Assessment       Row Name 06/10/24 1041       Living Environment    People in Home alone    Current Living Arrangements home    Primary Care Provided by self    Provides Primary Care For no one    Family Caregiver if Needed sibling(s)    Family Caregiver Names Alicia    Able to Return to Prior Arrangements yes       Resource/Environmental Concerns    Resource/Environmental Concerns none       Transition Planning    Patient/Family Anticipates Transition to home with family    Transportation Anticipated family or friend will provide       Discharge Needs Assessment    Readmission Within the Last 30 Days no previous admission in last 30 days    Equipment Currently Used at Home none    Concerns to be Addressed no discharge needs identified    Equipment Needed After Discharge none    Discharge Coordination/Progress spoke to patient who lives alone and is independent at home prior to illness; has a sister that lives nearby able to assist him if needed; has RX coverage and PCP; will follow for DC needs                   Discharge Plan    No documentation.                 Continued Care and Services - Admitted Since 6/9/2024    No active coordination exists for this encounter.          Demographic Summary    No documentation.                  Functional Status    No documentation.                  Psychosocial    No documentation.                  Abuse/Neglect    No documentation.                  Legal    No documentation.                  Substance Abuse    No documentation.                  Patient Forms    No documentation.                     Annemarie Childers RN

## 2024-06-10 NOTE — PROGRESS NOTES
Pharmacy Review Antimicrobial Stewardship     Current Antimicrobials:   Pharmacy to dose vancomycin  Pharmacy to Dose Zosyn  piperacillin-tazobactam (ZOSYN) 3.375 g IVPB in 100 mL NS MBP (CD)  Vancomycin HCl in NaCl solution - 1.5-0.9 GM/500ML-%    Indication(s): Pneumonia/Sepsis  Days of Therapy: 1 of 5    Is the therapy & duration appropriate based on evidence-based guidelines?: appropriate    Assessment/Action: Zosyn 3.375 g every 8 hours via extended infusion in combination with vancomycin.     Osvaldo Lowery PharmD  06/10/24 03:30 CDT      Subjective:   Diagnosis:   1. Atrial fibrillation with RVR    2. Sepsis, due to unspecified organism, unspecified whether acute organ dysfunction present    3. KYRA (acute kidney injury)    4. Pneumonia of left lower lobe due to infectious organism    5. Hypoxia         Allergies:  Allergies as of 06/09/2024    (No Known Allergies)       Objective:  Current Antimicrobial Medications:   Anti-Infectives (From admission, onward)      Ordered     Dose/Rate Route Frequency Start Stop    06/10/24 0319  vancomycin IVPB 1500 mg in 0.9% NaCl (Premix) 500 mL        Ordering Provider: Anny Chappell APRN    1,500 mg  333.3 mL/hr over 90 Minutes Intravenous Every 24 Hours 06/10/24 2200 06/15/24 2159    06/10/24 0319  piperacillin-tazobactam (ZOSYN) 3.375 g IVPB in 100 mL NS MBP (CD)        Ordering Provider: Anny Chappell APRN    3.375 g  over 4 Hours Intravenous Every 8 Hours 06/10/24 0900 06/15/24 0859    06/10/24 0302  Pharmacy to Dose Zosyn        Ordering Provider: Anny Chappell APRN     Does not apply Continuous PRN 06/10/24 0302 06/15/24 0301    06/10/24 0302  Pharmacy to dose vancomycin        Ordering Provider: Anny Chappell APRN     Does not apply Continuous PRN 06/10/24 0301 06/15/24 0300    06/10/24 0050  piperacillin-tazobactam (ZOSYN) 3.375 g IVPB in 100 mL NS MBP (CD)        Ordering Provider: Nicolas Meraz PA-C    3.375 g  over 30 Minutes Intravenous  "Once 06/10/24 0106 06/10/24 0323    06/10/24 0050  vancomycin IVPB 2000 mg in 0.9% Sodium Chloride 500 mL        Ordering Provider: Nicolas Meraz PA-C    20 mg/kg × 97.5 kg  250 mL/hr over 120 Minutes Intravenous Once 06/10/24 0106 06/10/24 0326            Culture Results:  No results found for: \"MRSAPCR\", \"BLOODCX\", \"BCIDPCR\", \"URINECX\", \"WOUNDCX\", \"RESPCX\", \"RVP\"    Microbiology Results (last 10 days)       ** No results found for the last 240 hours. **            Lab Results   Component Value Date    WBC 19.60 (H) 06/10/2024    WBC 24.91 (H) 06/09/2024     Temp Readings from Last 1 Encounters:   06/10/24 98.3 °F (36.8 °C) (Oral)      "

## 2024-06-10 NOTE — H&P
[facility]   HISTORY AND PHYSICAL    Patient Name: Rome Justice  : 1950  MRN: [id number]  Primary Care Physician:  Provider, No Known  Date of admission: 2024    Subjective   Subjective     Chief Complaint: Sepsis secondary to left-sided pneumonia    History of Present Illness  73-year-old male patient past medical history atrial fibrillation, anticoagulated on Eliquis, hypertension and hyperlipidemia presented to the emergency department for not feeling well, rigors and abdominal pain.  He does have a past medical history of renal calculus and initially felt he had a kidney stone and therefore sought medical attention.  Labs are concerning for sepsis by elevated WBC, lactate and Pro-Teodoro.  CT of the abdomen and pelvis revealed a full collecting renal system noted bilaterally without any obstructive uropathy and left lower lobe pneumonia.  Sepsis protocol implemented by the ED provider, IV fluid resuscitation with 30 mg/kg followed by empiric antibiotics with vancomycin and Zosyn after obtaining blood cultures.  During the hospitalization he went into A-fib with RVR.  Past medical history of A-fib with rates in the 150s.  He was given Cardizem bolus which was ineffective and then placed on a Cardizem drip.  Blood pressure soft after being placed on Cardizem drip.    Intensivist services was consulted for sepsis, felt to be secondary to pneumonia, A-fib with RVR and mild hypotension.  He was placed on supplemental oxygen 2 L per nasal cannula to maintain O2 saturations greater than 90%.  No home oxygen requirements.    I have seen and evaluated the patient in ER room 10.  The patient is alert, oriented and cognitively intact.  He does not appear to be in any acute distress.  He is currently in Trendelenburg position with a blood pressure 90 systolic and MAP in the 80s.  Heart rate 154.  A-fib per cardiac monitor.  He denies any chest pain or shortness of breath.  Endorses a longstanding history of  A-fib with a recent hospitalization in January for adequate rate control.    He reports he is currently visiting the area as he [place] in [place] near [place].  He reports he has not had any systemic symptoms other than today as he describes rigors, not feeling well and back pain.  He reports a past medical history of kidney stones and felt this could kidney stones therefore he sought medical attention.    He denies any preceding cough or chest congestion or fevers.  He does further endorse vomiting today however no diarrhea.  No sick contacts.  He reports in January he was hospitalized for A-fib with RVR as he spent 3 to 4 days in the ICU trying to regulate his heart rate.  He reports he placed him on new medications for rate control.  He reports he was placed on flecainide and his metoprolol was increased from 25 twice daily to 50 twice daily.  He reports today he has not taking any of his medications secondary to not feeling well, vomiting and he was at a car show today and did not have his medications handy.    I viewed his laboratory studies and they are concerning, WBC 24,000, 76% neutrophils, 15 bands and 22 TOMASA.  Glucose 122, BUN 35, creatinine 1.4, sodium 134, bili 1.6, GFR 53.  Pro-Teodoro 11.9.  Initial lactate 3 point 3 repeat lactate after IV fluid resuscitation normalized to 2.  Urinalysis shows orange color, moderate amount of blood, protein, leukoesterase, ketones, microscopic showed 3-5 WBCs and trace bacteria.      CT abdomen and pelvis with contrast shows mild fullness of both renal collecting systems without ureteral stone or hydroureter.  Findings may reflect baseline.  If symptoms of renal colic are present differential would include bilateral vesicular ureteral reflux although the urinary bladder is currently nondistended.  Further GI evaluation may be necessary.  Splenic granuloma noted.  The small and large bowel are normal.  No free air or free fluid.  Dense left lower lobe consolidation  compatible with severe pneumonia.  Small associated parapneumonic effusion suspected.    We will admit the patient to acute inpatient as I feel he requires ICU admission his blood pressures are soft, A-fib with RVR in the setting of sepsis secondary to pneumonia.  I did request a CT of the chest as there is no x-ray during this admission I feel that advanced imaging of the chest would be beneficial in further diagnosis of his pneumonia and warranted.    Abdominal Pain  Associated symptoms include nausea and vomiting.   Vomiting   Associated symptoms include abdominal pain.       Review of Systems   Constitutional:         Rigors   Gastrointestinal:  Positive for abdominal pain, nausea and vomiting.   All other systems reviewed and are negative.       Personal History     Past Medical History:   Diagnosis Date   • Atrial fibrillation    • Hypertension        Past Surgical History:   Procedure Laterality Date   • HERNIA REPAIR         Family History: family history is not on file. Otherwise pertinent FHx was reviewed and not pertinent to current issue.    Social History:  reports that he has never smoked. He has never used smokeless tobacco. He reports that he does not drink alcohol and does not use drugs.    Home Medications:  apixaban, atorvastatin, flecainide, lisinopril, and metoprolol tartrate    Allergies:  No Known Allergies    Objective    Objective     Vitals:   Temp:  [98.1 °F (36.7 °C)-100.3 °F (37.9 °C)] 98.3 °F (36.8 °C)  Heart Rate:  [] 133  Resp:  [16] 16  BP: ()/(58-73) 97/65  Flow (L/min):  [2-5] 5    Physical Exam  Vitals and nursing note reviewed. Exam conducted with a chaperone present.   Constitutional:       General: He is not in acute distress.     Appearance: He is not ill-appearing, toxic-appearing or diaphoretic.   HENT:      Head: Normocephalic and atraumatic.      Nose: Nose normal.      Mouth/Throat:      Mouth: Mucous membranes are moist. Mucous membranes are dry.   Eyes:       Extraocular Movements: Extraocular movements intact.      Pupils: Pupils are equal, round, and reactive to light.   Cardiovascular:      Rate and Rhythm: Tachycardia present. Rhythm irregular.   Pulmonary:      Effort: No respiratory distress.      Breath sounds: No wheezing.      Comments: Diminished lung sounds noted to the left lower base.  Abdominal:      General: Abdomen is flat.      Palpations: Abdomen is soft.      Tenderness: There is no abdominal tenderness.   Musculoskeletal:         General: Normal range of motion.      Cervical back: Normal range of motion.   Skin:     General: Skin is warm and dry.      Capillary Refill: Capillary refill takes less than 2 seconds.   Neurological:      General: No focal deficit present.      Mental Status: He is alert and oriented to person, place, and time.   Psychiatric:         Mood and Affect: Mood normal.         Behavior: Behavior normal.       MDM:  Result Review    Result Review:  I have personally reviewed the results from the time of this admission to 6/10/2024 03:56 CDT and agree with these findings:  [x]  Laboratory list / accordion  []  Microbiology  [x]  Radiology  [x]  EKG/Telemetry   []  Cardiology/Vascular   []  Pathology  []  Old records  []  Other:  Most notable findings include: WBC 24,000, lactate 3.3, Pro-Teodoro 11.9, CBC differential shows a shift, bandemia and TOMASA 22.      I viewed CT of the abdomen and pelvis imaging other does show left lower lobe pneumonia.  I ordered a CT of the chest and viewed the imaging there is a large left lobar pneumonia concerning for parapneumonic effusion.        Assessment & Plan   Assessment / Plan     Brief Patient Summary:  Rome Justice is a 73 y.o. male who presented to the emergency department today with rigors, nausea, vomiting and back pain.  CT of the abdomen and pelvis was concerning for left lower lobe pneumonia.  Incidental finding on the CT of the abdomen and pelvis revealed bilateral fullness of the  ureter collecting system.  Patient met sepsis criteria by evidence of leukocytosis, bandemia, elevated lactate, tachycardia and hypotension.  He was IV fluid resuscitated appropriately for which his blood pressure responded.  He was covered empirically with broad-spectrum antibiotics Zosyn and vancomycin.    During his ED course his heart rate increased.  Longstanding history of A-fib with a recent hospitalization at the end of January for rate control.  Rate greater than 170 on initial EKG.  After adequate IV fluid resuscitation he was placed on diltiazem drip after diltiazem bolus was ineffective.    The patient does not appear to be in any acute distress.  His O2 saturations were marginal therefore he was placed on 2 L of nasal cannula to maintain sats greater than 90%.  No oxygen requirements at baseline.    I had requested a CT of the chest to be added on to his ER workup as her was not a chest x-ray for viewing to confirm pneumonia other than the incidental finding noted on the CT of the abdomen and pelvis.  He clearly appear septic source felt to be pneumonia.    I independently viewed the CT of the chest films and there is a left lobar consolidation concerning for parapneumonic effusion.    Ideally, CTA of the chest to rule out any PE would have been my first choice however the patient already had a CT with contrast of the abdomen and pelvis prior to therefore we proceeded with a CT of the chest without IV contrast.  The patient is anticoagulant Eliquis therefore I feel a PE is less likely.    Active Hospital Problems:  Active Hospital Problems    Diagnosis    • **Sepsis    • Pneumonia involving left lung    • Chronic atrial fibrillation with rapid ventricular response    • Abnormal CT of the abdomen    • Acute respiratory failure with hypoxia      Plan:   Sepsis, source felt to be secondary to left lobar pneumonia concerns for parapneumonic effusion  Continue with broad-spectrum antibiotics vancomycin and  Zosyn, pharmacy to dose.  Obtain sputum culture, assess respiratory panel.  Consider CT surgery evaluation as this may be a parapneumonic effusion and may require further intervention.  Continue with supplemental oxygen 2 L per nasal cannula to maintain saturations greater than 90%  Incentive spirometer while awake  Repeat CBC, BMP, and magnesium in the a.m.  Add on high-sensitivity troponin  Continue with diltiazem drip for rate control.  Transition to oral metoprolol for rate control  Obtain urology consult for abnormal CT findings of fullness of bilateral ureter collecting system.  Reviewed urinalysis, there was some leukoesterase and bacteria, add on urine culture  Continue with p.o. home medication Eliquis for A-fib and DVT prophylaxis  Continue with home medication metoprolol 50 mg twice daily for rate control  Consider amiodarone drip if blood pressure is not able to tolerate oral beta-blockers      DVT prophylaxis: Eliquis  Medical DVT prophylaxis orders are present.        CODE STATUS:    Level Of Support Discussed With: Patient  Code Status (Patient has no pulse and is not breathing): CPR (Attempt to Resuscitate)  Medical Interventions (Patient has pulse or is breathing): Full Support    Admission Status:  I believe this patient meets inpatient status.    I admitted the patient overnight to the intensivist services.  Case will be discussed with Dr. Vann,  in the a.m.  Final treatment plan and recommendations will be at his discretion.     74 minutes of critical care provided. This time excludes other billable procedures. Time does include preparation of documents, medical consultations, review of old records, and direct bedside care. Patient is at high risk for life-threatening deterioration due to sepsis secondary to pneumonia, A-fib with RVR.      Anny Chappell, XAVIER

## 2024-06-10 NOTE — PAYOR COMM NOTE
"REF:[id number]    [facility]  [place]   [phone]  OR   FAX   [phone]    Rome Weinberg (73 y.o. Male)       Date of Birth   1950    Social Security Number       Address   [address] [address]    Home Phone   [phone]    MRN   [id number]       Hindu   Other    Marital Status                               Admission Date   6/9/24    Admission Type   Emergency    Admitting Provider   Hiwot Abraham MD    Attending Provider   Hiwot Abraham MD    Department, Room/Bed   [facility] INTENSIVE CARE, I012/1       Discharge Date       Discharge Disposition       Discharge Destination                                 Attending Provider: Hiwot Abraham MD    Allergies: No Known Allergies    Isolation: None   Infection: None   Code Status: CPR    Ht: 180.3 cm (71\")   Wt: 99.3 kg (218 lb 14.7 oz)    Admission Cmt: None   Principal Problem: Sepsis [A41.9]                   Active Insurance as of 6/9/2024       Primary Coverage       Payor Plan Insurance Group Employer/Plan Group    ANTHEM MEDICARE REPLACEMENT ANTHEM MEDICARE ADVANTAGE [id number]       Payor Plan Address Payor Plan Phone Number Payor Plan Fax Number Effective Dates    [phone]  1/1/2024 - None Entered    [facility] [address]         Subscriber Name Subscriber Birth Date Member ID       ROME WEINBERG 1950 [id number]               Secondary Coverage       Payor Plan Insurance Group Employer/Plan Group    [id number]        Payor Plan Address Payor Plan Phone Number Payor Plan Fax Number Effective Dates    [address]   6/9/2024 - None Entered    [facility] [address]         Subscriber Name Subscriber Birth Date Member ID       ROME WEINBERG. 1950 [id number]                     Emergency Contacts        (Rel.) Home Phone Work Phone Mobile Phone    KRISTYN FARMER (Sister) -- -- [phone]          6/9/2024 Event " "Details User   20:57 Patient arrival  Senia Rene, PCT   21:00 Vitals Assessment  Senia Rene, PCT   21:00 Vital Signs Vital Signs  Temp: 100.3 °F (37.9 °C)  Temp src: Oral  Heart Rate: 84  Heart Rate Source: Monitor  Resp: 16  Resp Rate Source: Visual  BP: 126/58  BP Location: Right arm  BP Method: Automatic  Patient Position: Sitting  BMI (Calculated): 30  Oxygen Therapy  SpO2: 95 %  Vitals Timer  Restart Vitals Timer: Yes  Height and Weight  Height: 180.3 cm (71\")  Height Method: Stated  Weight: 97.5 kg (215 lb)  Weight Method: Standing scale  Other flowsheet entries  Ideal Body Weight k.3 Senia Rene, [facility]   21:01 HPI HPI (Adult)  Stated Reason for Visit: patient reports left sided abdominal pain, left shoulder pain and nausea/vomiting. reports he had a \"shaking\" episode yesterday. reports poor appetite and decreased urine output over the last few days. patient here visiting a friend, not from the area. Jessi Metzger RN   21:01:47 Trigger for Triage Start  Jessi Metzger RN   21:01:47 Triage Started  Jessi Metzger RN   21:01:47 Chief Complaints Updated Abdominal Pain    Shoulder Pain    Vomiting Jessi Metzger RN     21:05 Pain Pain (Adult)  (0-10) Pain Rating: Rest: 7 Jessi Metzger RN     22:20:44 Pain Pain (Adult)  (0-10) Pain Rating: Rest: 9  Pain Location: abdomen; flank  Pain Side/Orientation: left Dennys Koehler RN     22:35 Pain Medication Administered - Adult Given - morphine injection 4 mg Dennys Koehler RN   22:35 Medication Given ondansetron (ZOFRAN) injection 4 mg - Dose: 4 mg ; Route: Intravenous ; Line: Peripheral  Posterior;Right Hand ; Scheduled Time:  Dennys Koehler RN   22:35 Medication Given morphine injection 4 mg - Dose: 4 mg ; Route: Intravenous ; Line: Peripheral  Posterior;Right Hand ; Scheduled Time:  Dennys Koehler RN   22:35 Data Pain  (0-10) Pain Rating: Rest: 9 Dennys Koehler RN     22:39 " Medication New Bag sodium chloride 0.9 % bolus 1,000 mL - Dose: 1,000 mL ; Rate: 2,000 mL/hr ; Route: Intravenous ; Line: Peripheral IV 06/09/24 2149 Posterior;Right Hand ; Scheduled Time: 2242 Dennys Koehler RN     22:39 Medication New Bag sodium chloride 0.9 % bolus 1,000 mL - Dose: 1,000 mL ; Rate: 2,000 mL/hr ; Route: Intravenous ; Line: Peripheral IV 06/09/24 2149 Posterior;Right Hand ; Scheduled Time: 2242 Dennys Koehler RN     23:04:23 Orders Placed Medications  - dilTIAZem (CARDIZEM) injection 24.4 mg; sodium chloride 0.9 % bolus 1,000 mL Emile Hernandez MD   23:05 Vital Signs Vital Signs  Heart Rate: 171 Abnormal  (Device Time: 23:05:00)  BP: 121/73 (Device Time: 23:05:00)  Oxygen Therapy  SpO2: 90 % (Device Time: 23:05:00) Dennys Koehler RN   23:05:28 Orders Acknowledged New  - acetaminophen (TYLENOL) tablet 1,000 mg; ECG 12 Lead Tachycardia; dilTIAZem (CARDIZEM) injection 24.4 mg; sodium chloride 0.9 % bolus 1,000 mL Dennys Koehler RN   23:06:37 Lab Resulted (Final result) LIPASE Lab, Background User   23:06:37 Lipase Resulted Collected: 6/9/2024 22:46  Last updated: 6/9/2024 23:06  Status: Final result  Lipase: 20 U/L [Ref Range: 13 - 60] Lab, Background User   23:08 Medication Given dilTIAZem (CARDIZEM) injection 24.4 mg - Dose: 24.4 mg ; Route: Intravenous ; Line: Peripheral IV 06/09/24 2149 Posterior;Right Hand ; Scheduled Time: 2320 Dennys Koehler RN     00:30 Vital Signs Oxygen Therapy  SpO2: 92 % (Device Time: 00:30:00) Dennys Koehler RN   00:31 Vital Signs Vital Signs  Heart Rate: 173 Abnormal  (Device Time: 00:31:00)  BP: 83/63 Abnormal  (Device Time: 00:31:00) Dennys Koehler RN   00:35 Vital Signs Vital Signs  Heart Rate: 165 Abnormal  (Device Time: 00:35:00)  BP: 103/58 (Device Time: 00:35:00)  Oxygen Therapy  SpO2: 92 % (Device Time: 00:36:00) Dennys Koehler RN   00:36 Vital Signs Vital Signs  Heart Rate: 173 Abnormal  (Device Time: 00:36:00)  BP: 89/69 Abnormal  (Device Time:  00:36:00) Dennys Koehler RN   00:37 Vital Signs Oxygen Therapy  SpO2: 91 % (Device Time: 00:37:00) Dennys Koehler RN   00:45 Sepsis Predictive Model Early Detection of Sepsis PA score  Early Detection of Sepsis PA score: 6.88 Inpatient, Batch Job   00:46 Vital Signs Vital Signs  Heart Rate: 147 Abnormal  (Device Time: 00:46:00)  BP: 104/64 (Device Time: 00:46:00)  Oxygen Therapy  SpO2: 91 % (Device Time: 00:46:00) Dennys Koehler RN   00:50:13 Orders Placed Microbiology  - Blood Culture - Blood,; Blood Culture - Blood,  Medications  - piperacillin-tazobactam (ZOSYN) 3.375 g IVPB in 100 mL NS MBP (CD); vancomycin IVPB 2000 mg in 0.9% Sodium Chloride 500 mL Nicolas Meraz PA-C   00:50:15 ED Medication Ordered  VANCOMYCIN HCL IN NACL 2-0.9 GM/500ML-% IV SOLN, PIPERACILLIN-TAZOBACTAM 3.375 G IVPB  ML NS MBP (CD) Nicolas Meraz PA-C   00:51 Medication New Bag dilTIAZem (CARDIZEM) 125 mg in 125 mL D5W infusion - Dose: 5 mg/hr ; Rate: 5 mL/hr ; Route: Intravenous ; Line: Peripheral IV 24 2149 Posterior;Right Hand ; Scheduled Time: 003 Dennys Koehler RN     01:16 Medication Rate/Dose Change dilTIAZem (CARDIZEM) 125 mg in 125 mL D5W infusion - Dose: 7.5 mg/hr ; Rate: 7.5 mL/hr ; Route: Intravenous ; Line: Peripheral IV 24 2149 Posterior;Right Hand ; Scheduled Time: 0116 Dennys Koehler RN          History & Physical        Anny Chappell APRN at 06/10/24 0304          [facility]   HISTORY AND PHYSICAL    Patient Name: Rome Justice  : 1950  MRN: [id number]  Primary Care Physician:  Provider, No Known  Date of admission: 2024    Subjective  Subjective     Chief Complaint: Sepsis secondary to left-sided pneumonia    History of Present Illness  73-year-old male patient past medical history atrial fibrillation, anticoagulated on Eliquis, hypertension and hyperlipidemia presented to the emergency department for not feeling well, rigors and abdominal pain.  He does have a past medical  history of renal calculus and initially felt he had a kidney stone and therefore sought medical attention.  Labs are concerning for sepsis by elevated WBC, lactate and Pro-Teodoro.  CT of the abdomen and pelvis revealed a full collecting renal system noted bilaterally without any obstructive uropathy and left lower lobe pneumonia.  Sepsis protocol implemented by the ED provider, IV fluid resuscitation with 30 mg/kg followed by empiric antibiotics with vancomycin and Zosyn after obtaining blood cultures.  During the hospitalization he went into A-fib with RVR.  Past medical history of A-fib with rates in the 150s.  He was given Cardizem bolus which was ineffective and then placed on a Cardizem drip.  Blood pressure soft after being placed on Cardizem drip.    Intensivist services was consulted for sepsis, felt to be secondary to pneumonia, A-fib with RVR and mild hypotension.  He was placed on supplemental oxygen 2 L per nasal cannula to maintain O2 saturations greater than 90%.  No home oxygen requirements.    I have seen and evaluated the patient in ER room 10.  The patient is alert, oriented and cognitively intact.  He does not appear to be in any acute distress.  He is currently in Trendelenburg position with a blood pressure 90 systolic and MAP in the 80s.  Heart rate 154.  A-fib per cardiac monitor.  He denies any chest pain or shortness of breath.  Endorses a longstanding history of A-fib with a recent hospitalization in January for adequate rate control.    He reports he is currently visiting the area as [place] near [place].  He reports he has not had any systemic symptoms other than today as he describes rigors, not feeling well and back pain.  He reports a past medical history of kidney stones and felt this could kidney stones therefore he sought medical attention.    He denies any preceding cough or chest congestion or fevers.  He does further endorse vomiting today however no diarrhea.  No  sick contacts.  He reports in January he was hospitalized for A-fib with RVR as he spent 3 to 4 days in the ICU trying to regulate his heart rate.  He reports he placed him on new medications for rate control.  He reports he was placed on flecainide and his metoprolol was increased from 25 twice daily to 50 twice daily.  He reports today he has not taking any of his medications secondary to not feeling well, vomiting and he was at a car show today and did not have his medications handy.    I viewed his laboratory studies and they are concerning, WBC 24,000, 76% neutrophils, 15 bands and 22 TOMASA.  Glucose 122, BUN 35, creatinine 1.4, sodium 134, bili 1.6, GFR 53.  Pro-Teodoro 11.9.  Initial lactate 3 point 3 repeat lactate after IV fluid resuscitation normalized to 2.  Urinalysis shows orange color, moderate amount of blood, protein, leukoesterase, ketones, microscopic showed 3-5 WBCs and trace bacteria.      CT abdomen and pelvis with contrast shows mild fullness of both renal collecting systems without ureteral stone or hydroureter.  Findings may reflect baseline.  If symptoms of renal colic are present differential would include bilateral vesicular ureteral reflux although the urinary bladder is currently nondistended.  Further GI evaluation may be necessary.  Splenic granuloma noted.  The small and large bowel are normal.  No free air or free fluid.  Dense left lower lobe consolidation compatible with severe pneumonia.  Small associated parapneumonic effusion suspected.    We will admit the patient to acute inpatient as I feel he requires ICU admission his blood pressures are soft, A-fib with RVR in the setting of sepsis secondary to pneumonia.  I did request a CT of the chest as there is no x-ray during this admission I feel that advanced imaging of the chest would be beneficial in further diagnosis of his pneumonia and warranted.    Abdominal Pain  Associated symptoms include nausea and vomiting.   Vomiting    Associated symptoms include abdominal pain.       Review of Systems   Constitutional:         Rigors   Gastrointestinal:  Positive for abdominal pain, nausea and vomiting.   All other systems reviewed and are negative.       Personal History     Past Medical History:   Diagnosis Date    Atrial fibrillation     Hypertension        Past Surgical History:   Procedure Laterality Date    HERNIA REPAIR         Family History: family history is not on file. Otherwise pertinent FHx was reviewed and not pertinent to current issue.    Social History:  reports that he has never smoked. He has never used smokeless tobacco. He reports that he does not drink alcohol and does not use drugs.    Home Medications:  apixaban, atorvastatin, flecainide, lisinopril, and metoprolol tartrate    Allergies:  No Known Allergies    Objective   Objective     Vitals:   Temp:  [98.1 °F (36.7 °C)-100.3 °F (37.9 °C)] 98.3 °F (36.8 °C)  Heart Rate:  [] 133  Resp:  [16] 16  BP: ()/(58-73) 97/65  Flow (L/min):  [2-5] 5    Physical Exam  Vitals and nursing note reviewed. Exam conducted with a chaperone present.   Constitutional:       General: He is not in acute distress.     Appearance: He is not ill-appearing, toxic-appearing or diaphoretic.   HENT:      Head: Normocephalic and atraumatic.      Nose: Nose normal.      Mouth/Throat:      Mouth: Mucous membranes are moist. Mucous membranes are dry.   Eyes:      Extraocular Movements: Extraocular movements intact.      Pupils: Pupils are equal, round, and reactive to light.   Cardiovascular:      Rate and Rhythm: Tachycardia present. Rhythm irregular.   Pulmonary:      Effort: No respiratory distress.      Breath sounds: No wheezing.      Comments: Diminished lung sounds noted to the left lower base.  Abdominal:      General: Abdomen is flat.      Palpations: Abdomen is soft.      Tenderness: There is no abdominal tenderness.   Musculoskeletal:         General: Normal range of motion.       Cervical back: Normal range of motion.   Skin:     General: Skin is warm and dry.      Capillary Refill: Capillary refill takes less than 2 seconds.   Neurological:      General: No focal deficit present.      Mental Status: He is alert and oriented to person, place, and time.   Psychiatric:         Mood and Affect: Mood normal.         Behavior: Behavior normal.       MDM:  Result Review   Result Review:  I have personally reviewed the results from the time of this admission to 6/10/2024 03:56 CDT and agree with these findings:  [x]  Laboratory list / accordion  []  Microbiology  [x]  Radiology  [x]  EKG/Telemetry   []  Cardiology/Vascular   []  Pathology  []  Old records  []  Other:  Most notable findings include: WBC 24,000, lactate 3.3, Pro-Teodoro 11.9, CBC differential shows a shift, bandemia and TOMASA 22.      I viewed CT of the abdomen and pelvis imaging other does show left lower lobe pneumonia.  I ordered a CT of the chest and viewed the imaging there is a large left lobar pneumonia concerning for parapneumonic effusion.        Assessment & Plan  Assessment / Plan     Brief Patient Summary:  Rome Justice is a 73 y.o. male who presented to the emergency department today with rigors, nausea, vomiting and back pain.  CT of the abdomen and pelvis was concerning for left lower lobe pneumonia.  Incidental finding on the CT of the abdomen and pelvis revealed bilateral fullness of the ureter collecting system.  Patient met sepsis criteria by evidence of leukocytosis, bandemia, elevated lactate, tachycardia and hypotension.  He was IV fluid resuscitated appropriately for which his blood pressure responded.  He was covered empirically with broad-spectrum antibiotics Zosyn and vancomycin.    During his ED course his heart rate increased.  Longstanding history of A-fib with a recent hospitalization at the end of January for rate control.  Rate greater than 170 on initial EKG.  After adequate IV fluid resuscitation he was  placed on diltiazem drip after diltiazem bolus was ineffective.    The patient does not appear to be in any acute distress.  His O2 saturations were marginal therefore he was placed on 2 L of nasal cannula to maintain sats greater than 90%.  No oxygen requirements at baseline.    I had requested a CT of the chest to be added on to his ER workup as her was not a chest x-ray for viewing to confirm pneumonia other than the incidental finding noted on the CT of the abdomen and pelvis.  He clearly appear septic source felt to be pneumonia.    I independently viewed the CT of the chest films and there is a left lobar consolidation concerning for parapneumonic effusion.    Ideally, CTA of the chest to rule out any PE would have been my first choice however the patient already had a CT with contrast of the abdomen and pelvis prior to therefore we proceeded with a CT of the chest without IV contrast.  The patient is anticoagulant Eliquis therefore I feel a PE is less likely.    Active Hospital Problems:  Active Hospital Problems    Diagnosis     **Sepsis     Pneumonia involving left lung     Chronic atrial fibrillation with rapid ventricular response     Abnormal CT of the abdomen     Acute respiratory failure with hypoxia      Plan:   Sepsis, source felt to be secondary to left lobar pneumonia concerns for parapneumonic effusion  Continue with broad-spectrum antibiotics vancomycin and Zosyn, pharmacy to dose.  Obtain sputum culture, assess respiratory panel.  Consider CT surgery evaluation as this may be a parapneumonic effusion and may require further intervention.  Continue with supplemental oxygen 2 L per nasal cannula to maintain saturations greater than 90%  Incentive spirometer while awake  Repeat CBC, BMP, and magnesium in the a.m.  Add on high-sensitivity troponin  Continue with diltiazem drip for rate control.  Transition to oral metoprolol for rate control  Obtain urology consult for abnormal CT findings of  fullness of bilateral ureter collecting system.  Reviewed urinalysis, there was some leukoesterase and bacteria, add on urine culture  Continue with p.o. home medication Eliquis for A-fib and DVT prophylaxis  Continue with home medication metoprolol 50 mg twice daily for rate control  Consider amiodarone drip if blood pressure is not able to tolerate oral beta-blockers      DVT prophylaxis: Eliquis  Medical DVT prophylaxis orders are present.        CODE STATUS:    Level Of Support Discussed With: Patient  Code Status (Patient has no pulse and is not breathing): CPR (Attempt to Resuscitate)  Medical Interventions (Patient has pulse or is breathing): Full Support    Admission Status:  I believe this patient meets inpatient status.    I admitted the patient overnight to the intensivist services.  Case will be discussed with Dr. Abraham,  in the a.m.  Final treatment plan and recommendations will be at his discretion.     74 minutes of critical care provided. This time excludes other billable procedures. Time does include preparation of documents, medical consultations, review of old records, and direct bedside care. Patient is at high risk for life-threatening deterioration due to sepsis secondary to pneumonia, A-fib with RVR.      XAVIER Damian    Electronically signed by Anny Chappell APRN at 06/10/24 0357          Emergency Department Notes        Sheela Kern RN at 06/10/24 0840          Called to update ICU LELA Bill about patient's CT result.     Electronically signed by Sheela Kern RN at 06/10/24 0841       Dennys Koehler, RN at 06/10/24 0212          Nursing report ED to floor  Rome Justice  73 y.o.  male    HPI:   Chief Complaint   Patient presents with    Abdominal Pain    Vomiting    Shoulder Pain       Admitting doctor:   Hiwot Abraham MD    Consulting provider(s):  Consults       No orders found for last 30 day(s).             Admitting diagnosis:   The primary encounter diagnosis was  Atrial fibrillation with RVR. Diagnoses of Sepsis, due to unspecified organism, unspecified whether acute organ dysfunction present, KYRA (acute kidney injury), Pneumonia of left lower lobe due to infectious organism, and Hypoxia were also pertinent to this visit.    Code status:   Current Code Status       Date Active Code Status Order ID Comments User Context       Not on file            Allergies:   Patient has no known allergies.    Intake and Output  No intake or output data in the 24 hours ending 06/10/24 0212    Weight:       06/09/24  2100   Weight: 97.5 kg (215 lb)       Most recent vitals:   Vitals:    06/10/24 0131 06/10/24 0200 06/10/24 0201 06/10/24 0211   BP: 107/70  104/72    Pulse: (!) 154  (!) 155    Resp:       Temp:    98.1 °F (36.7 °C)   TempSrc:    Oral   SpO2: 91% 91%     Weight:       Height:         Oxygen Therapy: .    Active LDAs/IV Access:   Lines, Drains & Airways       Active LDAs       Name Placement date Placement time Site Days    Peripheral IV 06/09/24 2149 Posterior;Right Hand 06/09/24 2149  Hand  less than 1    Peripheral IV 06/10/24 0127 Left;Posterior Hand 06/10/24  0127  Hand  less than 1                    Labs (abnormal labs have a star):   Labs Reviewed   COMPREHENSIVE METABOLIC PANEL - Abnormal; Notable for the following components:       Result Value    Glucose 122 (*)     BUN 35 (*)     Creatinine 1.40 (*)     Sodium 134 (*)     Total Bilirubin 1.6 (*)     eGFR 53.7 (*)     All other components within normal limits    Narrative:     GFR Normal >60  Chronic Kidney Disease <60  Kidney Failure <15    The GFR formula is only valid for adults with stable renal function between ages 18 and 70.   LACTIC ACID, PLASMA - Abnormal; Notable for the following components:    Lactate 3.3 (*)     All other components within normal limits   URINALYSIS W/ CULTURE IF INDICATED - Abnormal; Notable for the following components:    Color, UA Orange (*)     Ketones, UA Trace (*)     Blood, UA  "Moderate (2+) (*)     Protein,  mg/dL (2+) (*)     Leuk Esterase, UA Trace (*)     All other components within normal limits    Narrative:     Dipstick results may be inaccurate due to color interference.   CBC WITH AUTO DIFFERENTIAL - Abnormal; Notable for the following components:    WBC 24.91 (*)     All other components within normal limits   PROCALCITONIN - Abnormal; Notable for the following components:    Procalcitonin 11.96 (*)     All other components within normal limits    Narrative:     As a Marker for Sepsis (Non-Neonates):    1. <0.5 ng/mL represents a low risk of severe sepsis and/or septic shock.  2. >2 ng/mL represents a high risk of severe sepsis and/or septic shock.    As a Marker for Lower Respiratory Tract Infections that require antibiotic therapy:    PCT on Admission    Antibiotic Therapy       6-12 Hrs later    >0.5                Strongly Recommended  >0.25 - <0.5        Recommended   0.1 - 0.25          Discouraged              Remeasure/reassess PCT  <0.1                Strongly Discouraged     Remeasure/reassess PCT    As 28 day mortality risk marker: \"Change in Procalcitonin Result\" (>80% or <=80%) if Day 0 (or Day 1) and Day 4 values are available. Refer to http://www.[employer]-pct-calculator.com    Change in PCT <=80%  A decrease of PCT levels below or equal to 80% defines a positive change in PCT test result representing a higher risk for 28-day all-cause mortality of patients diagnosed with severe sepsis for septic shock.    Change in PCT >80%  A decrease of PCT levels of more than 80% defines a negative change in PCT result representing a lower risk for 28-day all-cause mortality of patients diagnosed with severe sepsis or septic shock.      MANUAL DIFFERENTIAL - Abnormal; Notable for the following components:    Neutrophil % 76.2 (*)     Lymphocyte % 3.0 (*)     Monocyte % 4.0 (*)     Eosinophil % 0.0 (*)     Bands %  15.8 (*)     Neutrophils Absolute 22.94 (*)     Monocytes " Absolute 1.00 (*)     All other components within normal limits   URINALYSIS, MICROSCOPIC ONLY - Abnormal; Notable for the following components:    WBC, UA 3-5 (*)     Bacteria, UA Trace (*)     All other components within normal limits   LIPASE - Normal   LACTIC ACID, REFLEX - Normal   BLOOD CULTURE   BLOOD CULTURE   RAINBOW DRAW    Narrative:     The following orders were created for panel order [facility] Draw.  Procedure                               Abnormality         Status                     ---------                               -----------         ------                     Green Top (Gel)[[id number]]                                  Final result               Lavender Top[[id number]]                                     Final result               Red Top[[id number]]                                          Final result               Zarco Top[[id number]]                                         Final result               Light Blue Top[[id number]]                                   Final result                 Please view results for these tests on the individual orders.   CBC AND DIFFERENTIAL    Narrative:     The following orders were created for panel order CBC & Differential.  Procedure                               Abnormality         Status                     ---------                               -----------         ------                     CBC Auto Differential[[id number]]        Abnormal            Final result                 Please view results for these tests on the individual orders.   GREEN TOP   LAVENDER TOP   RED TOP   GRAY TOP   LIGHT BLUE TOP       Meds given in ED:   Medications   sodium chloride 0.9 % flush 10 mL (has no administration in time range)   sodium chloride 0.9 % flush 10 mL (has no administration in time range)   sodium chloride 0.9 % flush 10 mL (has no administration in time range)   dilTIAZem (CARDIZEM) 125 mg in 125 mL D5W infusion (7.5 mg/hr Intravenous Rate/Dose Change  6/10/24 0116)   piperacillin-tazobactam (ZOSYN) 3.375 g IVPB in 100 mL NS MBP (CD) (has no administration in time range)   vancomycin IVPB 2000 mg in 0.9% Sodium Chloride 500 mL (2,000 mg Intravenous New Bag 6/10/24 0126)   sodium chloride 0.9 % bolus 2,925 mL (has no administration in time range)   metoprolol tartrate (LOPRESSOR) tablet 50 mg (has no administration in time range)   sodium chloride 0.9 % bolus 1,000 mL (0 mL Intravenous Stopped 6/10/24 0055)   ondansetron (ZOFRAN) injection 4 mg (4 mg Intravenous Given 6/9/24 2235)   morphine injection 4 mg (4 mg Intravenous Given 6/9/24 2235)   acetaminophen (TYLENOL) tablet 1,000 mg (1,000 mg Oral Given 6/9/24 2252)   dilTIAZem (CARDIZEM) injection 24.4 mg (24.4 mg Intravenous Given 6/9/24 2308)   sodium chloride 0.9 % bolus 1,000 mL (0 mL Intravenous Stopped 6/10/24 0115)   iopamidol (ISOVUE-300) 61 % injection 100 mL (100 mL Intravenous Given 6/9/24 2334)     dilTIAZem, 5-15 mg/hr, Last Rate: 7.5 mg/hr (06/10/24 0116)         NIH Stroke Scale:       Isolation/Infection(s):  No active isolations   No active infections     COVID Testing  Collected .  Resulted .    Nursing report ED to floor:  Mental status: . AXOX4  Ambulatory status: .ambulatory    Precautions: .    ED nurse phone extentsion- ..      Electronically signed by Dennys Koehler RN at 06/10/24 0212       Nicolas Meraz PA-C at 06/09/24 2124       Attestation signed by Emile Hernandez MD at 06/10/24 9823        SUPERVISE: For this patient encounter, I reviewed the APC's documentation, treatment plan, and medical decision making.  Emile Hernandez MD 6/10/2024 05:04 CDT                         Subjective   History of Present Illness    Patient is a 73-year-old male presenting to ED with left sided abdominal pain, nausea, vomiting.  PMH significant for long-term use Eliquis, history of atrial fibrillation, hypertension, hernia repair, history of previous kidney stones.  Patient states yesterday he  developed shaking chills and mild nausea as well as a slight left flank pain.  Patient reports that today his symptoms significantly increased and the pain now feels like with breath that radiates towards his left shoulder blade and is now radiating towards his left lower quadrant.  Patient denies any radiation of the pain into his testicles, right-sided abdominal or flank pain.  Patient denies dysuria, hematuria, or decreased urination.  Patient has continued to have chills today with no fevers or diaphoresis.  Patient started having nausea and vomiting as well as increasing waves of intense pain which she can no longer tolerate for which she presents for further evaluation.  Patient states many decades ago he had a history of 2 episodes of kidney stones which he passed on his own with no lithotripsies or stents and does not currently follow with a urologist.  Patient denies use of any medications prior to arrival and presents at this time for further evaluation.  Patient did state that he is currently visiting a friend in the area.    Records reviewed show patient was last seen outpatient at the PCP office on 4/1/2024 for dyslipidemia, paroxysmal A-fib, chest tightness, hypertension.    No previous ED visits.    No previous abdominal/pelvic imaging.    Review of Systems   Constitutional:  Positive for chills. Negative for diaphoresis and fever.   HENT: Negative.     Eyes: Negative.    Respiratory: Negative.     Cardiovascular: Negative.    Gastrointestinal:  Positive for abdominal pain (LLQ), nausea and vomiting. Negative for constipation and diarrhea.        Denies hematemesis   Genitourinary:  Positive for flank pain (left). Negative for decreased urine volume, difficulty urinating, dysuria, hematuria and testicular pain.   Skin: Negative.    Neurological: Negative.    Psychiatric/Behavioral: Negative.     All other systems reviewed and are negative.      Past Medical History:   Diagnosis Date    Atrial  fibrillation     Hypertension        No Known Allergies    Past Surgical History:   Procedure Laterality Date    HERNIA REPAIR         History reviewed. No pertinent family history.    Social History     Socioeconomic History    Marital status:    Tobacco Use    Smoking status: Never   Substance and Sexual Activity    Alcohol use: Never    Drug use: Never           Objective   Physical Exam  Vitals and nursing note reviewed.   Constitutional:       General: He is in acute distress (Appears to be uncomfortable due to pain, restless and pacing around room).      Appearance: Normal appearance. He is well-developed and well-groomed. He is not ill-appearing, toxic-appearing or diaphoretic.   HENT:      Head: Normocephalic.      Mouth/Throat:      Mouth: Mucous membranes are moist.      Pharynx: Oropharynx is clear.   Eyes:      General: No scleral icterus.     Conjunctiva/sclera: Conjunctivae normal.      Pupils: Pupils are equal, round, and reactive to light.   Cardiovascular:      Rate and Rhythm: Regular rhythm. Tachycardia present.   Pulmonary:      Effort: Pulmonary effort is normal.      Breath sounds: Normal breath sounds.   Abdominal:      General: Bowel sounds are normal. There is no distension.      Palpations: Abdomen is soft.      Tenderness: There is no abdominal tenderness. There is left CVA tenderness. There is no right CVA tenderness or guarding.   Musculoskeletal:         General: Normal range of motion.      Cervical back: Neck supple.      Right lower leg: No edema.      Left lower leg: No edema.   Skin:     General: Skin is warm.      Coloration: Skin is not jaundiced.   Neurological:      Mental Status: He is alert and oriented to person, place, and time.      Gait: Gait normal.   Psychiatric:         Mood and Affect: Mood normal.         Behavior: Behavior normal. Behavior is cooperative.         Procedures          ED Course                                             Medical Decision  Making  Problems Addressed:  KYRA (acute kidney injury): complicated acute illness or injury  Atrial fibrillation with RVR: complicated acute illness or injury  Hypoxia: complicated acute illness or injury  Pneumonia of left lower lobe due to infectious organism: complicated acute illness or injury  Sepsis, due to unspecified organism, unspecified whether acute organ dysfunction present: complicated acute illness or injury    Amount and/or Complexity of Data Reviewed  External Data Reviewed: labs, radiology and notes.  Labs: ordered. Decision-making details documented in ED Course.  Radiology: ordered. Decision-making details documented in ED Course.  ECG/medicine tests: ordered. Decision-making details documented in ED Course.  Discussion of management or test interpretation with external provider(s): Dr. Emile Hernandez (attending)  Mrs. Anny PARK (intensivist)    Risk  OTC drugs.  Prescription drug management.  Decision regarding hospitalization.        Patient is a 73-year-old male presenting to ED with left sided abdominal pain, nausea, vomiting.  PMH significant for long-term use Eliquis, history of atrial fibrillation, hypertension, hernia repair, history of previous kidney stones.  Upon initial evaluation patient is standing and pacing in the room restless due to pain for which he is guarding on his left flank region.  Patient is nontoxic-appearing, nondiaphoretic.  Examination finds reproducible left CVAT with no right CVAT.  Anterior abdomen is soft, nontender, nondistended with normal bowel sounds.  Patient is tachycardic with no other acute abnormal exam findings.  No evidence of jaundice, scleral icterus.  Normal posterior oropharynx.  Discussed with patient need for workup to include labs, urinalysis, CT imaging and ability to control his pain with IV medications for which she is amenable with no further questions, concerns, needs at this time.    Differential diagnosis: Renal stone, ureteral  stone, KYRA, urinary tract infection, pyelonephritis, sepsis, systemic infection, diverticulitis, diverticulosis, colitis, bowel obstruction, constipation, mesenteric ischemia, mesenteric adenitis, other    Lab work revealed leukocytosis of 24.91.  Elevated relative bands at 15.8%, ANC 22.94.  Stable H&H, normal platelets no further CBC abnormalities.  Further concern for systemic process with lactic acid 3.3 for which patient received IV hydration.  Procalcitonin with further concern for systemic bacterial process elevated 11.96.  Blood cultures were obtained.  Low concern for pancreatic abnormality such as pancreatitis with normal lipase.  CMP revealed total bilirubin 1.6, creatinine 1.4 with BUN 35, GFR 53.7.  No electrolyte disturbances otherwise.  No further hepatic dysfunction.  Urinalysis with trace leukocytes, trace bacteria, 3-5 WBC and negative nitrites.  A culture was sent.  CT imaging of the abdomen and pelvis showed: Mild fullness of both renal collecting systems without ureteral stone or hydroureter, findings may reflect baseline, splenic granuloma, small and large bowel are normal, no free air or free fluid is present, dense left lower lobe consolidation compatible with severe pneumonia, small associated parapneumonic effusion suspected.  While in the ED awaiting evaluation patient went into A-fib with RVR for which she was given an initial 0.25 mg/kg dose of diltiazem with no improvement in his heart rate for which she was then started on a drip.  Patient was initially started on vancomycin and Zosyn.  Patient did throughout evaluation also required placement on 2 L of nasal cannula oxygen for which he has never used home oxygen.  Discussed with patient need for admission for further evaluation and treatment for which she is amenable with no further questions, concerns, or needs at this time.  Case was discussed with XAVIER Collins on-call for intensivist who will come to except patient under  admission to the services of Dr. Abraham with request for chest CT without contrast prior to admission.       Final diagnoses:   Atrial fibrillation with RVR   Sepsis, due to unspecified organism, unspecified whether acute organ dysfunction present   KYRA (acute kidney injury)   Pneumonia of left lower lobe due to infectious organism   Hypoxia       ED Disposition  ED Disposition       ED Disposition   Decision to Admit    Condition   --    Comment   Level of Care: Critical Care [6]   Diagnosis: Sepsis [[id number]]   Admitting Physician: DENISE ABRAHAM [[id number]]   Certification: I Certify That Inpatient Hospital Services Are Medically Necessary For Greater Than 2 Midnights                 No follow-up provider specified.       Medication List      No changes were made to your prescriptions during this visit.            Nicolas Meraz PA-C  06/10/24 0212      Electronically signed by Emile Hernandez MD at 06/10/24 0504       Vital Signs (last 2 days)       Date/Time Temp Temp src Pulse Resp BP Patient Position SpO2    06/10/24 1012 -- -- -- -- -- -- 94    06/10/24 0900 97.7 (36.5) Oral -- -- -- -- --    06/10/24 0730 -- -- 101 -- -- -- 95    06/10/24 0700 -- -- 86 -- 90/71 -- 94    06/10/24 0630 -- -- 95 -- 87/74 -- 91    06/10/24 0619 -- -- 100 -- -- -- 93    06/10/24 0530 -- -- 109 -- 74/59 -- 93    06/10/24 0500 -- -- 91 -- 92/49 -- 92    06/10/24 0400 -- -- 115 -- -- -- 91    06/10/24 0320 -- -- 133 -- -- -- --    06/10/24 0300 -- -- 125 -- 97/65 -- 92    06/10/24 0239 -- -- 137 -- -- -- 92    06/10/24 0235 98.3 (36.8) Oral -- -- -- -- --    06/10/24 02:11:55 98.1 (36.7) Oral -- -- -- -- --    06/10/24 0201 -- -- 155 -- 104/72 -- --    06/10/24 0200 -- -- -- -- -- -- 91    06/10/24 0131 -- -- 154 -- 107/70 -- 91    06/10/24 0046 -- -- 147 -- 104/64 -- 91    06/10/24 0037 -- -- -- -- -- -- 91    06/10/24 0036 -- -- 173 -- 89/69 -- --    06/10/24 0035 -- -- 165 -- 103/58 -- 92    06/10/24 0031 -- -- 173 -- 83/63  -- --    06/10/24 0030 -- -- -- -- -- -- 92    06/10/24 0017 -- -- 170 -- -- -- --    06/10/24 0016 -- -- -- -- -- -- 91    06/10/24 0015 -- -- 156 -- -- -- 90    06/09/24 2325 -- -- 146 -- -- -- 92    06/09/24 2316 -- -- 130 -- 111/64 -- 94    06/09/24 2305 -- -- 171 -- 121/73 -- 90    06/09/24 22:51:27 98.5 (36.9) -- -- -- -- -- --    06/09/24 2100 100.3 (37.9) Oral 84 16 126/58 Sitting 95          Oxygen Therapy (last 2 days)       Date/Time SpO2 Device (Oxygen Therapy) Flow (L/min) Oxygen Concentration (%) ETCO2 (mmHg)    06/10/24 1012 94 humidified;high-flow nasal cannula 6 -- --    06/10/24 0800 -- humidified;high-flow nasal cannula 6 -- --    06/10/24 0730 95 -- -- -- --    06/10/24 0700 94 -- -- -- --    06/10/24 0630 91 -- -- -- --    06/10/24 0619 93 high-flow nasal cannula;humidified 6 -- --    06/10/24 0530 93 humidified;high-flow nasal cannula 5 -- --    06/10/24 0500 92 -- -- -- --    06/10/24 0400 91 -- -- -- --    06/10/24 0300 92 -- -- -- --    06/10/24 0239 92 -- -- -- --    06/10/24 0238 -- -- 5 -- --    06/10/24 02:08:24 -- -- 4 -- --    06/10/24 0200 91 -- -- -- --    06/10/24 0131 91 -- -- -- --    06/10/24 0046 91 -- -- -- --    06/10/24 0037 91 -- -- -- --    06/10/24 0035 92 -- -- -- --    06/10/24 0030 92 -- -- -- --    06/10/24 0016 91 -- -- -- --    06/10/24 0015 90 -- -- -- --    06/09/24 2325 92 -- -- -- --    06/09/24 2316 94 -- -- -- --    06/09/24 2305 90 -- -- -- --    06/09/24 22:53:38 -- nasal cannula 2 -- --    06/09/24 2100 95 -- -- -- --          Intake & Output (last 2 days)         06/08 0701 06/09 0700 06/09 0701  06/10 0700 06/10 0701 06/11 0700    P.O.   360    I.V. (mL/kg)   934.8 (9.4)    IV Piggyback   100    Total Intake(mL/kg)   1394.8 (14)    Urine (mL/kg/hr)  200 200 (0.3)    Total Output  200 200    Net  -200 +1194.8                 Facility-Administered Medications as of 6/10/2024   Medication Dose Route Frequency Provider Last Rate Last Admin    acetaminophen  (TYLENOL) tablet 650 mg  650 mg Oral Q4H PRN Anny Chappell APRN        Or    acetaminophen (TYLENOL) suppository 650 mg  650 mg Rectal Q4H PRN Anny Chappell APRN        [COMPLETED] acetaminophen (TYLENOL) tablet 1,000 mg  1,000 mg Oral Once Nicolas Meraz PA-C   1,000 mg at 06/09/24 2252    apixaban (ELIQUIS) tablet 5 mg  5 mg Oral Q12H Anny Chappell APRN   5 mg at 06/10/24 0906    atorvastatin (LIPITOR) tablet 40 mg  40 mg Oral Nightly Anny Chappell APRN        azithromycin (ZITHROMAX) 500 mg in sodium chloride 0.9 % 250 mL IVPB-VTB  500 mg Intravenous Q24H Anny Chappell APRN   500 mg at 06/10/24 0905    sennosides-docusate (PERICOLACE) 8.6-50 MG per tablet 2 tablet  2 tablet Oral BID Anny Chappell APRN        And    polyethylene glycol (MIRALAX) packet 17 g  17 g Oral Daily PRN Anny Chappell APRN        And    bisacodyl (DULCOLAX) EC tablet 5 mg  5 mg Oral Daily PRN Anny Chappell APRN        And    bisacodyl (DULCOLAX) suppository 10 mg  10 mg Rectal Daily PRN Anny Chappell APRN        Calcium Replacement - Follow Nurse / BPA Driven Protocol   Does not apply PRN Anny Chappell APRN        cefTRIAXone (ROCEPHIN) 2,000 mg in sodium chloride 0.9 % 100 mL MBP  2,000 mg Intravenous Q24H Anny Chappell APRN 200 mL/hr at 06/10/24 0750 2,000 mg at 06/10/24 0750    [COMPLETED] Chlorhexidine Gluconate Cloth 2 % pads 1 Application  1 Application Topical Once Anny Chappell APRN   1 Application at 06/10/24 0248    [START ON 6/11/2024] Chlorhexidine Gluconate Cloth 2 % pads 1 Application  1 Application Topical Q24H Anny Chappell APRN        dilTIAZem (CARDIZEM) 125 mg in 125 mL D5W infusion  5-15 mg/hr Intravenous Titrated Nicolas Meraz PA-C   Stopped at 06/10/24 0521    [COMPLETED] dilTIAZem (CARDIZEM) injection 24.4 mg  0.25 mg/kg Intravenous Once Emile Hernandez MD   24.4 mg at 06/09/24 2121    [COMPLETED] iopamidol (ISOVUE-300) 61 % injection 100 mL  100 mL  Intravenous Once in imaging Nicolas Mreaz PA-C   100 mL at 06/09/24 2334    lactated ringers infusion  100 mL/hr Intravenous Continuous Anny Chappell APRN 100 mL/hr at 06/10/24 0637 100 mL/hr at 06/10/24 0637    Magnesium Standard Dose Replacement - Follow Nurse / BPA Driven Protocol   Does not apply PRN Anny Chappell APRN        [COMPLETED] magnesium sulfate 2g/50 mL (PREMIX) infusion  2 g Intravenous Q2H Anny Chappell APRN   2 g at 06/10/24 0905    [COMPLETED] metoprolol tartrate (LOPRESSOR) tablet 50 mg  50 mg Oral Once Nicolas Meraz PA-C   50 mg at 06/10/24 0253    metoprolol tartrate (LOPRESSOR) tablet 50 mg  50 mg Oral Q12H Anny Chappell APRN   50 mg at 06/10/24 0906    [COMPLETED] morphine injection 4 mg  4 mg Intravenous Once Emile Hernandez MD   4 mg at 06/09/24 2235    mupirocin (BACTROBAN) 2 % nasal ointment 1 Application  1 Application Each Nare BID Anny Chappell APRN   1 Application at 06/10/24 0905    nitroglycerin (NITROSTAT) SL tablet 0.4 mg  0.4 mg Sublingual Q5 Min PRN Anny Chappell APRN        [COMPLETED] ondansetron (ZOFRAN) injection 4 mg  4 mg Intravenous Once Nicolas Meraz PA-C   4 mg at 06/09/24 2235    ondansetron (ZOFRAN) injection 4 mg  4 mg Intravenous Q6H PRN Anny Chappell APRN        pantoprazole (PROTONIX) EC tablet 40 mg  40 mg Oral Q AM Anny Chappell APRN   40 mg at 06/10/24 0600    Phosphorus Replacement - Follow Nurse / BPA Driven Protocol   Does not apply PRN Anny Chappell APRN        [COMPLETED] piperacillin-tazobactam (ZOSYN) 3.375 g IVPB in 100 mL NS MBP (CD)  3.375 g Intravenous Once Nicolas Meraz PA-C   3.375 g at 06/10/24 0253    Potassium Replacement - Follow Nurse / BPA Driven Protocol   Does not apply PRN Anny Chappell APRN        [COMPLETED] sodium chloride 0.9 % bolus 1,000 mL  1,000 mL Intravenous Once Nicolas Meraz PA-C   Stopped at 06/10/24 0055    [COMPLETED] sodium chloride 0.9 % bolus 1,000 mL  1,000 mL  "Intravenous Once Emile Hernandez MD   Stopped at 06/10/24 0115    sodium chloride 0.9 % bolus 2,925 mL  30 mL/kg Intravenous Once Nicolas Meraz PA-C        sodium chloride 0.9 % flush 10 mL  10 mL Intravenous PRN Hiwot Abraham MD        sodium chloride 0.9 % flush 10 mL  10 mL Intravenous PRN Nicolas Meraz PA-C        sodium chloride 0.9 % flush 10 mL  10 mL Intravenous PRN Nicolas Meraz PA-C        sodium chloride 0.9 % flush 10 mL  10 mL Intravenous Q12H Anny Chappell, APRN   10 mL at 06/10/24 0906    sodium chloride 0.9 % flush 10 mL  10 mL Intravenous PRN Anny Chappell APRN        sodium chloride 0.9 % infusion 40 mL  40 mL Intravenous PRN Anny Chappell APRIMELDA        [COMPLETED] vancomycin IVPB 2000 mg in 0.9% Sodium Chloride 500 mL  20 mg/kg Intravenous Once Nicolas Meraz PA-C 250 mL/hr at 06/10/24 0126 2,000 mg at 06/10/24 0126     Orders (last 48 hrs)        Start     Ordered    06/12/24 2100  Vancomycin, Trough Please draw 30-60 minutes prior to 2200 dose.  Timed,   Status:  Canceled        Comments: Please draw 30-60 minutes prior to 2200 dose.      06/10/24 0319    06/11/24 0400  Chlorhexidine Gluconate Cloth 2 % pads 1 Application  Every 24 Hours         06/10/24 0239    06/10/24 2200  vancomycin IVPB 1500 mg in 0.9% NaCl (Premix) 500 mL  Every 24 Hours,   Status:  Discontinued         06/10/24 0319    06/10/24 2100  atorvastatin (LIPITOR) tablet 40 mg  Nightly         06/10/24 0241    06/10/24 0900  sennosides-docusate (PERICOLACE) 8.6-50 MG per tablet 2 tablet  2 Times Daily        Placed in \"And\" Linked Group    06/10/24 0239    06/10/24 0900  apixaban (ELIQUIS) tablet 5 mg  Every 12 Hours Scheduled         06/10/24 0241    06/10/24 0900  metoprolol tartrate (LOPRESSOR) tablet 50 mg  Every 12 Hours Scheduled         06/10/24 0241    06/10/24 0900  piperacillin-tazobactam (ZOSYN) 3.375 g IVPB in 100 mL NS MBP (CD)  Every 8 Hours,   Status:  Discontinued         06/10/24 0319 "    06/10/24 0900  azithromycin (ZITHROMAX) 500 mg in sodium chloride 0.9 % 250 mL IVPB-VTB  Every 24 Hours         06/10/24 0719    06/10/24 0800  cefTRIAXone (ROCEPHIN) 2,000 mg in sodium chloride 0.9 % 100 mL MBP  Every 24 Hours         06/10/24 0719    06/10/24 0721  Legionella Antigen, Urine - Urine, Urine, Clean Catch  Once         06/10/24 0722    06/10/24 0719  Silicone Border Dressing to Bony Prominences  Per Order Details        Comments: Silicone foam border dressing applied to sacral spine/heels for protection.  Nursing to change dressing every 3 days and PRN if soiled. Nursing is to peel back dressing with every assessment to assess skin underneath dressing.    06/10/24 0718    06/10/24 0718  Follow Pressure Ulcer Prevention Measures Policy  Continuous        Comments: Implement Appropriate Pressure Ulcer Prevention Measures  - Open Order Report to View Full Instructions  Enter Wound LDA & Document Assessment  Add Wound Care Plan  Add Patient Education Per Policy    06/10/24 0718    06/10/24 0718  Turn Patient  Now Then Every 2 Hours         06/10/24 0718    06/10/24 0718  Elevate Heels Off of Bed  Until Discontinued         06/10/24 0718    06/10/24 0718  Use Seat Cushion When Up In Chair  Continuous         06/10/24 0718    06/10/24 0718  Use Repositioning Wedge to Position Patient  Continuous        Comments: Use Comfort Glide repositioning sheet and wedges to position patient.    06/10/24 0718    06/10/24 0715  lactated ringers infusion  Continuous         06/10/24 0618    06/10/24 0702  Inpatient Urology Consult  IN AM        Specialty:  Urology  Provider:  Jose Gregg MD    06/10/24 0341    06/10/24 0628  High Sensitivity Troponin T 2Hr  PROCEDURE ONCE         06/10/24 0516    06/10/24 0600  Basic Metabolic Panel  Daily       06/10/24 0239    06/10/24 0600  CBC & Differential  Daily       06/10/24 0239    06/10/24 0600  Magnesium  Daily       06/10/24 0239    06/10/24 0600  Protime-INR   Morning Draw         06/10/24 0239    06/10/24 0600  CBC Auto Differential  PROCEDURE ONCE         06/10/24 0239    06/10/24 0600  MRSA Screen, PCR (Inpatient) - Swab, Nares  Once         06/10/24 0319    06/10/24 0600  High Sensitivity Troponin T  Morning Draw         06/10/24 0327    06/10/24 0600  pantoprazole (PROTONIX) EC tablet 40 mg  Every Early Morning         06/10/24 0343    06/10/24 0430  magnesium sulfate 2g/50 mL (PREMIX) infusion  Every 2 Hours         06/10/24 0412    06/10/24 0341  Respiratory Culture - Sputum, Cough  Once         06/10/24 0341    06/10/24 0341  Respiratory Panel PCR w/COVID-19(SARS-CoV-2) [facility] In-House, NP Swab in UTM/VTM, 2 HR TAT - Swab, Nasopharynx  Once         06/10/24 0341    06/10/24 0340  Urinalysis With Culture If Indicated -  Once        Comments: Please ensure 'Use Existing Specimen' is selected if this order is for urine culture add-on.  If no button appears, please contact the lab for assistance.      06/10/24 0341    06/10/24 0327  Manual Differential  Once         06/10/24 0326    06/10/24 0305  Inpatient Consult to Advance Care Planning  Once        Provider:  (Not yet assigned)    06/10/24 0304    06/10/24 0302  Pharmacy to Dose Zosyn  Continuous PRN,   Status:  Discontinued         06/10/24 0302    06/10/24 0301  Pharmacy to dose vancomycin  Continuous PRN,   Status:  Discontinued         06/10/24 0302    06/10/24 0300  Vital Signs Every Hour and Per Hospital Policy Based on Patient Condition  Every Hour       06/10/24 0239    06/10/24 0300  Intake & Output  Every Hour       06/10/24 0239    06/10/24 0255  sodium chloride 0.9 % flush 10 mL  Every 12 Hours Scheduled         06/10/24 0239    06/10/24 0255  mupirocin (BACTROBAN) 2 % nasal ointment 1 Application  2 Times Daily         06/10/24 0239    06/10/24 0255  Chlorhexidine Gluconate Cloth 2 % pads 1 Application  Once         06/10/24 0239    06/10/24 0240  Daily Weights  Daily       06/10/24  "0239    06/10/24 0240  Phosphorus  Daily       06/10/24 0239    06/10/24 0239  ondansetron (ZOFRAN) injection 4 mg  Every 6 Hours PRN         06/10/24 0239    06/10/24 0239  acetaminophen (TYLENOL) tablet 650 mg  Every 4 Hours PRN        Placed in \"Or\" Linked Group    06/10/24 0239    06/10/24 0239  acetaminophen (TYLENOL) suppository 650 mg  Every 4 Hours PRN        Placed in \"Or\" Linked Group    06/10/24 0239    06/10/24 0239  Potassium Replacement - Follow Nurse / BPA Driven Protocol  As Needed         06/10/24 0239    06/10/24 0239  Magnesium Standard Dose Replacement - Follow Nurse / BPA Driven Protocol  As Needed         06/10/24 0239    06/10/24 0239  Phosphorus Replacement - Follow Nurse / BPA Driven Protocol  As Needed         06/10/24 0239    06/10/24 0239  Calcium Replacement - Follow Nurse / BPA Driven Protocol  As Needed         06/10/24 0239    06/10/24 0238  Continuous Cardiac Monitoring  Continuous        Comments: Follow Standing Orders As Outlined in Process Instructions (Open Order Report to View Full Instructions)    06/10/24 0239    06/10/24 0238  Maintain IV Access  Continuous,   Status:  Canceled         06/10/24 0239    06/10/24 0238  Telemetry - Place Orders & Notify Provider of Results When Patient Experiences Acute Chest Pain, Dysrhythmia or Respiratory Distress  Continuous        Comments: Open Order Report to View Parameters Requiring Provider Notification    06/10/24 0239    06/10/24 0238  Continuous Pulse Oximetry  Continuous         06/10/24 0239    06/10/24 0238  Height & Weight  Once         06/10/24 0239    06/10/24 0238  Oral Care - Patient Not on NPPV & Not Intubated  Every Shift       06/10/24 0239    06/10/24 0238  Target Arousal Level RASS 0 to -1  Continuous         06/10/24 0239    06/10/24 0238  Use Mobility Guidelines for Advancement of Activity  Continuous         06/10/24 0239    06/10/24 0238  Insert Peripheral IV  Once         06/10/24 0239    06/10/24 0238  [place] " "Lock & Maintain IV Access  Continuous         06/10/24 0239    06/10/24 0238  Code Status and Medical Interventions:  Continuous         06/10/24 0239    06/10/24 0238  Diet: Regular/House; Fluid Consistency: Thin (IDDSI 0)  Diet Effective Now         06/10/24 0239    06/10/24 0237  sodium chloride 0.9 % infusion 40 mL  As Needed         06/10/24 0239    06/10/24 0237  polyethylene glycol (MIRALAX) packet 17 g  Daily PRN        Placed in \"And\" Linked Group    06/10/24 0239    06/10/24 0237  bisacodyl (DULCOLAX) EC tablet 5 mg  Daily PRN        Placed in \"And\" Linked Group    06/10/24 0239    06/10/24 0237  bisacodyl (DULCOLAX) suppository 10 mg  Daily PRN        Placed in \"And\" Linked Group    06/10/24 0239    06/10/24 0237  nitroglycerin (NITROSTAT) SL tablet 0.4 mg  Every 5 Minutes PRN         06/10/24 0239    06/10/24 0237  sodium chloride 0.9 % flush 10 mL  As Needed         06/10/24 0239    06/10/24 0219  metoprolol tartrate (LOPRESSOR) tablet 50 mg  Once         06/10/24 0203    06/10/24 0147  CT Chest Without Contrast Diagnostic  1 Time Imaging         06/10/24 0146    06/10/24 0147  Inpatient Admission  Once         06/10/24 0147    06/10/24 0138  sodium chloride 0.9 % bolus 2,925 mL  Once         06/10/24 0122    06/10/24 0106  piperacillin-tazobactam (ZOSYN) 3.375 g IVPB in 100 mL NS MBP (CD)  Once         06/10/24 0050    06/10/24 0106  vancomycin IVPB 2000 mg in 0.9% Sodium Chloride 500 mL  Once         06/10/24 0050    06/10/24 0051  Blood Culture - Blood, Hand, Left  Once        Placed in \"And\" Linked Group    06/10/24 0050    06/10/24 0051  Blood Culture - Blood, Arm, Right  Once        Placed in \"And\" Linked Group    06/10/24 0050    06/10/24 0048  STAT Lactic Acid, Reflex  PROCEDURE ONCE         06/09/24 2227    06/10/24 0039  dilTIAZem (CARDIZEM) 125 mg in 125 mL D5W infusion  Titrated         06/10/24 0023    06/09/24 2350  iopamidol (ISOVUE-300) 61 % injection 100 mL  Once in Imaging         " "06/09/24 2334    06/09/24 2320  dilTIAZem (CARDIZEM) injection 24.4 mg  Once         06/09/24 2304    06/09/24 2320  sodium chloride 0.9 % bolus 1,000 mL  Once         06/09/24 2304    06/09/24 2259  ECG 12 Lead Tachycardia  STAT         06/09/24 2259 06/09/24 2246  acetaminophen (TYLENOL) tablet 1,000 mg  Once         06/09/24 2230 06/09/24 2242  sodium chloride 0.9 % bolus 1,000 mL  Once         06/09/24 2226 06/09/24 2242  ondansetron (ZOFRAN) injection 4 mg  Once         06/09/24 2226 06/09/24 2242  morphine injection 4 mg  Once         06/09/24 2226 06/09/24 2228  Comprehensive Metabolic Panel  Once         06/09/24 2112 06/09/24 2228  Lipase  Once         06/09/24 2112 06/09/24 2228  Procalcitonin  Once         06/09/24 2124 06/09/24 2226  Insert Peripheral IV  Once        Placed in \"And\" Linked Group    06/09/24 2226 06/09/24 2226  CT Abdomen Pelvis With Contrast  1 Time Imaging         06/09/24 2226 06/09/24 2225  sodium chloride 0.9 % flush 10 mL  As Needed        Placed in \"And\" Linked Group    06/09/24 2226 06/09/24 2202  Urinalysis, Microscopic Only - Urine, Clean Catch  Once         06/09/24 2201 06/09/24 2201  Manual Differential  Once         06/09/24 2200 06/09/24 2125  Insert Peripheral IV  Once        Placed in \"And\" Linked Group    06/09/24 2124 06/09/24 2125  Cardiac Monitoring  Continuous,   Status:  Canceled        Comments: Follow Standing Orders As Outlined in Process Instructions (Open Order Report to View Full Instructions)    06/09/24 2124 06/09/24 2125  Continuous Pulse Oximetry  Continuous,   Status:  Canceled         06/09/24 2124 06/09/24 2124  sodium chloride 0.9 % flush 10 mL  As Needed        Placed in \"And\" Linked Group    06/09/24 2124 06/09/24 2113  [place] Draw  Once         06/09/24 2112 06/09/24 2113  Green Top (Gel)  PROCEDURE ONCE         06/09/24 2112 06/09/24 2113  Lavender Top  PROCEDURE ONCE         06/09/24 2112 "    06/09/24 2113  Red Top  PROCEDURE ONCE         06/09/24 2112 06/09/24 2113  Gray Top  PROCEDURE ONCE         06/09/24 2112 06/09/24 2113  Light Blue Top  PROCEDURE ONCE         06/09/24 2112 06/09/24 2112  Urinalysis With Culture If Indicated - Urine, Clean Catch  Once         06/09/24 2112 06/09/24 2111  NPO Diet NPO Type: Strict NPO  Diet Effective Now,   Status:  Canceled         06/09/24 2112 06/09/24 2111  Undress & Gown  Once         06/09/24 2112 06/09/24 2111  Insert Peripheral IV  Once         06/09/24 2112 06/09/24 2111  [place] Draw  Once,   Status:  Canceled         06/09/24 2112 06/09/24 2111  CBC & Differential  Once         06/09/24 2112 06/09/24 2111  Urinalysis With Microscopic If Indicated (No Culture) - Urine, Clean Catch  Once,   Status:  Canceled         06/09/24 2112 06/09/24 2111  Lactic Acid, Plasma  Once         06/09/24 2112 06/09/24 2111  Green Top (Gel)  PROCEDURE ONCE,   Status:  Canceled         06/09/24 2112 06/09/24 2111  Lavender Top  PROCEDURE ONCE,   Status:  Canceled         06/09/24 2112 06/09/24 2111  Red Top  PROCEDURE ONCE,   Status:  Canceled         06/09/24 2112 06/09/24 2111  Light Blue Top  PROCEDURE ONCE,   Status:  Canceled         06/09/24 2112 06/09/24 2111  CBC Auto Differential  PROCEDURE ONCE         06/09/24 2112 06/09/24 2110  sodium chloride 0.9 % flush 10 mL  As Needed         06/09/24 2112 06/09/24 2107  ECG 12 Lead Other; shoulder pain  Once         06/09/24 2107    Unscheduled  Oxygen Therapy- Nasal Cannula; Titrate 1-6 LPM Per SpO2; 90 - 95%  Continuous PRN       06/10/24 0239    --  apixaban (Eliquis) 5 MG tablet tablet  Every 12 Hours Scheduled         06/09/24 2150    --  atorvastatin (LIPITOR) 40 MG tablet  Nightly         06/09/24 2150    --  flecainide (TAMBOCOR) 100 MG tablet  2 Times Daily         06/09/24 2150    --  lisinopril (PRINIVIL,ZESTRIL) 10 MG tablet  Daily         06/09/24 2150    --   "metoprolol tartrate (LOPRESSOR) 25 MG tablet  2 Times Daily         06/09/24 2150                     Consult Notes (last 48 hours)        Jose Gregg MD at 06/10/24 1053        Consult Orders    1. Inpatient Urology Consult [[id number]] ordered by Anny Chappell APRN at 06/10/24 0341                 Urology  Length of Stay: 0  Patient Care Team:  Provider, No Known as PCP - General    Chief Complaint: Possible ureteral fullness on CT scan    Subjective     Interval History:   73-year-old male with history of radiation therapy for prostate cancer many years ago in [place] admitted for pneumonia incidentally found on his CT abdomen pelvis with contrast to have possible bilateral fullness of his collecting systems on the radiology report.  Patient  denies any urinary difficulty.  No hematuria or dysuria.  His bladder scan PVR was only 5 cc after voiding this morning.  I reviewed his CT scan and he has some mild extrarenal pelves bilaterally without stone or hydronephrosis on my review.  He does have a small subtle renal cortical irregularity on the right consistent with possible \"pseudotumor\" no discrete masses identifiable.    I independently visualized and reviewed the patient's prior imaging studies today in clinic and discussed the imaging findings with the patient.      Review of Systems:   Review of Systems   Respiratory:  Positive for shortness of breath.    All other systems reviewed and are negative.      Objective       Intake/Output Summary (Last 24 hours) at 6/10/2024 1054  Last data filed at 6/10/2024 0905  Gross per 24 hour   Intake 1394.75 ml   Output 400 ml   Net 994.75 ml       Vital Signs  Temp:  [97.7 °F (36.5 °C)-100.3 °F (37.9 °C)] 97.7 °F (36.5 °C)  Heart Rate:  [] 101  Resp:  [16] 16  BP: ()/(49-74) 90/71    Physical Exam:  Physical Exam  Constitutional: Well nourished, Well developed; No apparent distress.  His vital signs are reviewed  Psychiatric: Appropriate affect; " Alert and oriented  Eyes: Unremarkable  Musculoskeletal: Normal gait and station  GI: Abdomen is soft, nontender  Respiratory: No distress; Unlabored movement; No accessory musculature needed with symmetric movements  Skin: No pallor or diaphoresis  : Penis and testicles are normal;      Results Review:       I reviewed the patient's new clinical results.  Lab Results (last 24 hours)       Procedure Component Value Units Date/Time    Legionella Antigen, Urine - Urine, Urine, Clean Catch [[id number]] Collected: 06/10/24 0932    Specimen: Urine, Clean Catch Updated: 06/10/24 0942    High Sensitivity Troponin T 2Hr [[id number]]  (Normal) Collected: 06/10/24 0626    Specimen: Blood Updated: 06/10/24 0655     HS Troponin T 18 ng/L      Troponin T Delta 1 ng/L     Narrative:      High Sensitive Troponin T Reference Range:  <14.0 ng/L- Negative Female for AMI  <22.0 ng/L- Negative Male for AMI  >=14 - Abnormal Female indicating possible myocardial injury.  >=22 - Abnormal Male indicating possible myocardial injury.   Clinicians would have to utilize clinical acumen, EKG, Troponin, and serial changes to determine if it is an Acute Myocardial Infarction or myocardial injury due to an underlying chronic condition.         MRSA Screen, PCR (Inpatient) - Swab, Nares [[id number]]  (Normal) Collected: 06/10/24 0417    Specimen: Swab from Nares Updated: 06/10/24 0609     MRSA PCR No MRSA Detected    Narrative:      The negative predictive value of this diagnostic test is high and should only be used to consider de-escalating anti-MRSA therapy. A positive result may indicate colonization with MRSA and must be correlated clinically.    Respiratory Panel PCR w/COVID-19(SARS-CoV-2) [facility]DA/[facility]CKY/ELVIRA/[facility] In-House, NP Swab in UTM/VTM, 2 HR TAT - Swab, Nasopharynx [[id number]]  (Normal) Collected: 06/10/24 0417    Specimen: Swab from Nasopharynx Updated: 06/10/24 0543     ADENOVIRUS, PCR Not Detected     Coronavirus 229E Not Detected      Coronavirus HKU1 Not Detected     Coronavirus NL63 Not Detected     Coronavirus OC43 Not Detected     COVID19 Not Detected     Human Metapneumovirus Not Detected     Human Rhinovirus/Enterovirus Not Detected     Influenza A PCR Not Detected     Influenza B PCR Not Detected     Parainfluenza Virus 1 Not Detected     Parainfluenza Virus 2 Not Detected     Parainfluenza Virus 3 Not Detected     Parainfluenza Virus 4 Not Detected     RSV, PCR Not Detected     Bordetella pertussis pcr Not Detected     Bordetella parapertussis PCR Not Detected     Chlamydophila pneumoniae PCR Not Detected     Mycoplasma pneumo by PCR Not Detected    Narrative:      In the setting of a positive respiratory panel with a viral infection PLUS a negative procalcitonin without other underlying concern for bacterial infection, consider observing off antibiotics or discontinuation of antibiotics and continue supportive care. If the respiratory panel is positive for atypical bacterial infection (Bordetella pertussis, Chlamydophila pneumoniae, or Mycoplasma pneumoniae), consider antibiotic de-escalation to target atypical bacterial infection.    High Sensitivity Troponin T [[id number]]  (Normal) Collected: 06/10/24 0428    Specimen: Blood Updated: 06/10/24 0516     HS Troponin T 17 ng/L     Narrative:      High Sensitive Troponin T Reference Range:  <14.0 ng/L- Negative Female for AMI  <22.0 ng/L- Negative Male for AMI  >=14 - Abnormal Female indicating possible myocardial injury.  >=22 - Abnormal Male indicating possible myocardial injury.   Clinicians would have to utilize clinical acumen, EKG, Troponin, and serial changes to determine if it is an Acute Myocardial Infarction or myocardial injury due to an underlying chronic condition.         Respiratory Culture - Sputum, Cough [[id number]] Collected: 06/10/24 0449    Specimen: Sputum from Cough Updated: 06/10/24 0459    Manual Differential [[id number]]  (Abnormal) Collected: 06/10/24 3907     Specimen: Blood Updated: 06/10/24 0408     Neutrophil % 71.0 %      Lymphocyte % 4.0 %      Monocyte % 2.0 %      Eosinophil % 0.0 %      Basophil % 1.0 %      Bands %  22.0 %      Neutrophils Absolute 18.23 10*3/mm3      Lymphocytes Absolute 0.78 10*3/mm3      Monocytes Absolute 0.39 10*3/mm3      Eosinophils Absolute 0.00 10*3/mm3      Basophils Absolute 0.20 10*3/mm3      Crenated RBC's Mod/2+     Poikilocytes Mod/2+     Dohle Bodies Present     Vacuolated Neutrophils Mod/2+     Platelet Morphology Normal    Basic Metabolic Panel [[id number]]  (Abnormal) Collected: 06/10/24 0305    Specimen: Blood Updated: 06/10/24 0346     Glucose 122 mg/dL      BUN 33 mg/dL      Creatinine 1.31 mg/dL      Sodium 134 mmol/L      Potassium 4.3 mmol/L      Chloride 102 mmol/L      CO2 21.0 mmol/L      Calcium 7.6 mg/dL      BUN/Creatinine Ratio 25.2     Anion Gap 11.0 mmol/L      eGFR 58.2 mL/min/1.73     Narrative:      GFR Normal >60  Chronic Kidney Disease <60  Kidney Failure <15    The GFR formula is only valid for adults with stable renal function between ages 18 and 70.    Magnesium [[id number]]  (Abnormal) Collected: 06/10/24 0305    Specimen: Blood Updated: 06/10/24 0346     Magnesium 1.5 mg/dL     Phosphorus [[id number]]  (Normal) Collected: 06/10/24 0305    Specimen: Blood Updated: 06/10/24 0346     Phosphorus 2.5 mg/dL     Protime-INR [[id number]]  (Abnormal) Collected: 06/10/24 0305    Specimen: Blood Updated: 06/10/24 0335     Protime 20.2 Seconds      INR 1.66    CBC & Differential [[id number]]  (Abnormal) Collected: 06/10/24 0305    Specimen: Blood Updated: 06/10/24 0327    Narrative:      The following orders were created for panel order CBC & Differential.  Procedure                               Abnormality         Status                     ---------                               -----------         ------                     CBC Auto Differential[[id number]]        Abnormal            Final result              "    Please view results for these tests on the individual orders.    CBC Auto Differential [[id number]]  (Abnormal) Collected: 06/10/24 0305    Specimen: Blood Updated: 06/10/24 0327     WBC 19.60 10*3/mm3      RBC 3.99 10*6/mm3      Hemoglobin 12.2 g/dL      Hematocrit 37.1 %      MCV 93.0 fL      MCH 30.6 pg      MCHC 32.9 g/dL      RDW 13.9 %      RDW-SD 47.8 fl      MPV 11.0 fL      Platelets 175 10*3/mm3      nRBC 0.0 /100 WBC     STAT Lactic Acid, Reflex [[id number]]  (Normal) Collected: 06/10/24 0114    Specimen: Blood from Arm, Left Updated: 06/10/24 0158     Lactate 2.0 mmol/L     Blood Culture - Blood, Arm, Right [[id number]] Collected: 06/10/24 0122    Specimen: Blood from Arm, Right Updated: 06/10/24 0145    Blood Culture - Blood, Hand, Left [[id number]] Collected: 06/10/24 0114    Specimen: Blood from Hand, Left Updated: 06/10/24 0145    Procalcitonin [[id number]]  (Abnormal) Collected: 06/09/24 2246    Specimen: Blood Updated: 06/09/24 2317     Procalcitonin 11.96 ng/mL     Narrative:      As a Marker for Sepsis (Non-Neonates):    1. <0.5 ng/mL represents a low risk of severe sepsis and/or septic shock.  2. >2 ng/mL represents a high risk of severe sepsis and/or septic shock.    As a Marker for Lower Respiratory Tract Infections that require antibiotic therapy:    PCT on Admission    Antibiotic Therapy       6-12 Hrs later    >0.5                Strongly Recommended  >0.25 - <0.5        Recommended   0.1 - 0.25          Discouraged              Remeasure/reassess PCT  <0.1                Strongly Discouraged     Remeasure/reassess PCT    As 28 day mortality risk marker: \"Change in Procalcitonin Result\" (>80% or <=80%) if Day 0 (or Day 1) and Day 4 values are available. Refer to [email or web address]    Change in PCT <=80%  A decrease of PCT levels below or equal to 80% defines a positive change in PCT test result representing a higher risk for 28-day all-cause mortality of patients diagnosed " with severe sepsis for septic shock.    Change in PCT >80%  A decrease of PCT levels of more than 80% defines a negative change in PCT result representing a lower risk for 28-day all-cause mortality of patients diagnosed with severe sepsis or septic shock.       Comprehensive Metabolic Panel [[id number]]  (Abnormal) Collected: 06/09/24 2246    Specimen: Blood Updated: 06/09/24 2311     Glucose 122 mg/dL      BUN 35 mg/dL      Creatinine 1.40 mg/dL      Sodium 134 mmol/L      Potassium 4.6 mmol/L      Chloride 99 mmol/L      CO2 23.0 mmol/L      Calcium 8.8 mg/dL      Total Protein 7.3 g/dL      Albumin 3.8 g/dL      ALT (SGPT) 14 U/L      AST (SGOT) 19 U/L      Alkaline Phosphatase 111 U/L      Total Bilirubin 1.6 mg/dL      Globulin 3.5 gm/dL      A/G Ratio 1.1 g/dL      BUN/Creatinine Ratio 25.0     Anion Gap 12.0 mmol/L      eGFR 53.7 mL/min/1.73     Narrative:      GFR Normal >60  Chronic Kidney Disease <60  Kidney Failure <15    The GFR formula is only valid for adults with stable renal function between ages 18 and 70.    Lipase [[id number]]  (Normal) Collected: 06/09/24 2246    Specimen: Blood Updated: 06/09/24 2306     Lipase 20 U/L     Urinalysis, Microscopic Only - Urine, Clean Catch [[id number]]  (Abnormal) Collected: 06/09/24 2142    Specimen: Urine, Clean Catch Updated: 06/09/24 2236     RBC, UA 0-2 /HPF      WBC, UA 3-5 /HPF      Comment: Urine culture not indicated.        Bacteria, UA Trace /HPF      Squamous Epithelial Cells, UA 0-2 /HPF      Hyaline Casts, UA 7-12 /LPF      Coarse Granular Casts, UA 3-6 /LPF      Methodology Manual Light Microscopy    Manual Differential [[id number]]  (Abnormal) Collected: 06/09/24 2148    Specimen: Blood Updated: 06/09/24 2228     Neutrophil % 76.2 %      Lymphocyte % 3.0 %      Monocyte % 4.0 %      Eosinophil % 0.0 %      Basophil % 0.0 %      Bands %  15.8 %      Atypical Lymphocyte % 1.0 %      Neutrophils Absolute 22.94 10*3/mm3      Lymphocytes Absolute 1.00  10*3/mm3      Monocytes Absolute 1.00 10*3/mm3      Eosinophils Absolute 0.00 10*3/mm3      Basophils Absolute 0.00 10*3/mm3      Crenated RBC's Mod/2+     Poikilocytes Mod/2+     Polychromasia Slight/1+     Dohle Bodies Present     Vacuolated Neutrophils Slight/1+     Platelet Morphology Normal    Urinalysis With Culture If Indicated - Urine, Clean Catch [[id number]]  (Abnormal) Collected: 06/09/24 2142    Specimen: Urine, Clean Catch Updated: 06/09/24 2228     Color, UA [place]     Appearance, UA Clear     pH, UA 5.5     Specific Gravity, UA 1.025     Glucose, UA Negative     Ketones, UA Trace     Bilirubin, UA Negative     Blood, UA Moderate (2+)     Protein,  mg/dL (2+)     Leuk Esterase, UA Trace     Nitrite, UA Negative     Urobilinogen, UA 1.0 E.U./dL    Narrative:      Dipstick results may be inaccurate due to color interference.    Lactic Acid, Plasma [[id number]]  (Abnormal) Collected: 06/09/24 2148    Specimen: Blood Updated: 06/09/24 2227     Lactate 3.3 mmol/L     CBC & Differential [[id number]]  (Abnormal) Collected: 06/09/24 2148    Specimen: Blood Updated: 06/09/24 2222    Narrative:      The following orders were created for panel order CBC & Differential.  Procedure                               Abnormality         Status                     ---------                               -----------         ------                     CBC Auto Differential[[id number]]        Abnormal            Final result                 Please view results for these tests on the individual orders.    CBC Auto Differential [[id number]]  (Abnormal) Collected: 06/09/24 2148    Specimen: Blood Updated: 06/09/24 2222     WBC 24.91 10*3/mm3      RBC 4.26 10*6/mm3      Hemoglobin 13.1 g/dL      Hematocrit 39.5 %      MCV 92.7 fL      MCH 30.8 pg      MCHC 33.2 g/dL      RDW 14.1 %      RDW-SD 47.8 fl      MPV 11.1 fL      Platelets 200 10*3/mm3      nRBC 0.0 /100 WBC     [place] Draw [[id number]] Collected: 06/09/24 2148     Specimen: Blood Updated: 06/09/24 2201    Narrative:      The following orders were created for panel order Vendor Draw.  Procedure                               Abnormality         Status                     ---------                               -----------         ------                     Green Top (Gel)[[id number]]                                  Final result               Lavender Top[[id number]]                                     Final result               Red Top[[id number]]                                          Final result               [place] Top[[id number]]                                         Final result               Light Blue Top[[id number]]                                   Final result                 Please view results for these tests on the individual orders.    Green Top (Gel) [[id number]] Collected: 06/09/24 2148    Specimen: Blood Updated: 06/09/24 2201     Extra Tube Hold for add-ons.     Comment: Auto resulted.       Red Top [[id number]] Collected: 06/09/24 2148    Specimen: Blood Updated: 06/09/24 2201     Extra Tube Hold for add-ons.     Comment: Auto resulted.       [place] Top [[id number]] Collected: 06/09/24 2148    Specimen: Blood Updated: 06/09/24 2201     Extra Tube Hold for add-ons.     Comment: Auto resulted.       Light Blue Top [[id number]] Collected: 06/09/24 2148    Specimen: Blood Updated: 06/09/24 2201     Extra Tube Hold for add-ons.     Comment: Auto resulted       Lavender Top [[id number]] Collected: 06/09/24 2148    Specimen: Blood Updated: 06/09/24 2201     Extra Tube hold for add-on     Comment: Auto resulted             Imaging Results (Last 24 Hours)       Procedure Component Value Units Date/Time    CT Abdomen Pelvis With Contrast [[id number]] Collected: 06/10/24 0607     Updated: 06/10/24 0816    Narrative:      EXAMINATION: CT ABDOMEN PELVIS W CONTRAST-      6/9/2024 10:15 PM     HISTORY: LLQ, left flank, [place]; I48.91-Unspecified atrial fibrillation     In order to have  a CT radiation dose as low as reasonably achievable  Automated Exposure Control was utilized for adjustment of the mA and/or  KV according to patient size.     Total DLP = 416.95 mGy.cm     The CT scan of the abdomen and pelvis is performed after intravenous  contrast enhancement.     Images are acquired in the axial plane and subsequent reconstruction in  coronal and sagittal planes.     There is no previous similar study for comparison.     The chest is separately reviewed and reported.     There is fatty infiltration of the liver. No focal intrinsic  abnormality. The spleen is normal with multiple small granulomas.     No radiopaque gallstones.     Pancreas is normal.     The adrenal glands bilaterally are normal.     There is moderate lobulation of renal contour bilaterally. There is mild  irregularity and focal bulge along the mid right lateral kidney which  may represent a hypertrophied column of Solo or asymmetrical cortical  hypertrophy?. It measures approximately 1.8 cm in diameter. No  radiopaque calculi. There is mild ectasia of the collecting system  bilaterally involving the proximal ureters on both sides. There is  surrounding retroperitoneal fat infiltration. No calculi in either  ureter. Urinary bladder is poorly distended. No intrinsic abnormality.     Prostate is not enlarged. There is intrinsic calcification. There are  small metallic objects in the prostate with streak artifact. This may  represent radiotherapy seeds.     There is a small fat-containing umbilical hernia.     There is small hiatal hernia. Stomach and duodenum are unremarkable.  Small bowel is nondistended. Appendix is not visualized. No finding to  suggest appendicitis. Moderate gas and stool is seen in the colon. There  is diverticulosis of the colon. No evidence of diverticulitis.     Atheromatous change of the abdominal aorta and iliac arteries. No  aneurysmal dilatation.     There is no evidence of abdominal or pelvic  lymphadenopathy.     Images reviewed in bone window show chronic degenerative changes of the  lumbar spine with mild dextroscoliosis. Small focus of bony sclerosis  involving the right iliac bone adjacent to the right sacroiliac joint  and similar finding in the adjacent sacrum may represent bone islands?.  Possibility of metastatic disease is not excluded.       Impression:      1. A mild symmetrical ectasia of the renal collecting system bilaterally  may represent a normal variation, chronic pyelonephritis or  vesicoureteral reflux disease?. No calculi. No finding to suggest  obstruction. This may be clinically correlated and further evaluated.  2. An apparent irregularity of the right renal parenchyma/cortex in the  midpole may represent a pseudotumor?. However possibility of a  neoplastic process may not be excluded. A follow-up contrast enhanced CT  or MR imaging of the abdomen with renal mass protocol may be obtained.  3. Other nonacute findings as above.  4. The chest is separately reviewed and reported.     The above study was initially reviewed and reported by [facility]. The  above-mentioned discrepancy was reported to ER physician at 8:13 a.m.                                   This report was signed and finalized on 6/10/2024 8:13 AM by Dr. Muriel Dixon MD.       CT Chest Without Contrast Diagnostic [[id number]] Collected: 06/10/24 0600     Updated: 06/10/24 0723    Narrative:      EXAMINATION: CT CHEST WO CONTRAST DIAGNOSTIC-      6/10/2024 1:15 AM     HISTORY: left pna; I48.91-Unspecified atrial fibrillation; A41.9-Sepsis,  unspecified organism; N17.9-Acute kidney failure, unspecified;  J18.9-Pneumonia, unspecified organism; R09.02-Hypoxemia     In order to have a CT radiation dose as low as reasonably achievable  Automated Exposure Control was utilized for adjustment of the mA and/or  KV according to patient size.     Total DLP = 221.38 mGy.cm     The CT scan of the chest is performed without  intravenous contrast  enhancement.     Images are acquired in the axial plane and subsequent reconstruction in  coronal and sagittal planes.     There is no previous similar study for comparison.     There are areas of consolidation with air bronchograms involving the  left upper lobe posteriorly and the left lower lobe.     A smaller infiltrate is seen in the right parahilar area and right lower  lobe.     There is a small bibasilar pleural effusion.     There are atelectatic changes in the right middle lobe.     The Central airway is patent. No endobronchial abnormality is noted.     There are a few calcified granulomas in the lungs bilaterally.     Limited visualized soft tissue of the neck are unremarkable.     The thyroid gland is suboptimally visualized and not well evaluated.  There is a probable nodule in the left lobe.     There is no axillary lymphadenopathy.     Atheromatous change of thoracic aorta noted. No aneurysmal dilatation.     Moderate ectasia of the central pulmonary arteries represent pulmonary  arterial hypertension.     There are a few borderline prominent mediastinal lymph nodes. The  largest lymph node in the left hilum, level 5, measures 9 mm in short  axis. There are calcified granulomas in the mediastinum and right hilum.  The hilar lymph nodes are not well visualized or evaluated in this study  due to lack of contrast enhancement.     Atheromatous change of coronary arteries are noted.     There is moderate cardiomegaly.     There is a small hiatal hernia.     The limited visualized unenhanced abdomen is unremarkable and there is a  residual contrast in the renal collecting system bilaterally from a  previous contrast enhanced CT scan of the abdomen and pelvis.     Images reviewed in bone windows show chronic degenerative changes of the  thoracic spine. No acute bony abnormality.       Impression:      1. Bilateral lung infiltrate, significantly more extensive on the left,  represent an  acute inflammatory/infectious process.  2. Small bibasilar pleural effusion.  3. Nonspecific borderline mediastinal lymphadenopathy probably represent  reactive adenopathy to the infiltrate.     The above study was initially reviewed and reported by [facility]. I do not  find any discrepancies.                                                        This report was signed and finalized on 6/10/2024 7:20 AM by Dr. Muriel Dixon MD.               Medication Review:     Current Facility-Administered Medications:     acetaminophen (TYLENOL) tablet 650 mg, 650 mg, Oral, Q4H PRN **OR** acetaminophen (TYLENOL) suppository 650 mg, 650 mg, Rectal, Q4H PRN, Anny Chappell, APRN    apixaban (ELIQUIS) tablet 5 mg, 5 mg, Oral, Q12H, Anny Chappell, APRN, 5 mg at 06/10/24 0906    atorvastatin (LIPITOR) tablet 40 mg, 40 mg, Oral, Nightly, Anny Chappell, APRN    azithromycin (ZITHROMAX) 500 mg in sodium chloride 0.9 % 250 mL IVPB-VTB, 500 mg, Intravenous, Q24H, Anny Chappell, APRN, 500 mg at 06/10/24 0905    sennosides-docusate (PERICOLACE) 8.6-50 MG per tablet 2 tablet, 2 tablet, Oral, BID **AND** polyethylene glycol (MIRALAX) packet 17 g, 17 g, Oral, Daily PRN **AND** bisacodyl (DULCOLAX) EC tablet 5 mg, 5 mg, Oral, Daily PRN **AND** bisacodyl (DULCOLAX) suppository 10 mg, 10 mg, Rectal, Daily PRN, Anny Chappell, APRN    Calcium Replacement - Follow Nurse / BPA Driven Protocol, , Does not apply, PRN, Anny Chappell, APRN    cefTRIAXone (ROCEPHIN) 2,000 mg in sodium chloride 0.9 % 100 mL MBP, 2,000 mg, Intravenous, Q24H, Anny Chappell, APRN, Last Rate: 200 mL/hr at 06/10/24 0750, 2,000 mg at 06/10/24 0750    [START ON 6/11/2024] Chlorhexidine Gluconate Cloth 2 % pads 1 Application, 1 Application, Topical, Q24H, Anny Chappell APRN    dilTIAZem (CARDIZEM) 125 mg in 125 mL D5W infusion, 5-15 mg/hr, Intravenous, Titrated, Nicolas Meraz PA-C, Stopped at 06/10/24 0521    lactated ringers infusion, 100 mL/hr,  Intravenous, Continuous, Anny Chappell APRN, Last Rate: 100 mL/hr at 06/10/24 0637, 100 mL/hr at 06/10/24 0637    Magnesium Standard Dose Replacement - Follow Nurse / BPA Driven Protocol, , Does not apply, PRN, Anny Chappell, APRN    metoprolol tartrate (LOPRESSOR) tablet 50 mg, 50 mg, Oral, Q12H, Anny Chappell APRN, 50 mg at 06/10/24 0906    mupirocin (BACTROBAN) 2 % nasal ointment 1 Application, 1 Application, Each Nare, BID, Anny Chappell APRN, 1 Application at 06/10/24 0905    nitroglycerin (NITROSTAT) SL tablet 0.4 mg, 0.4 mg, Sublingual, Q5 Min PRN, Anny Chappell APRN    ondansetron (ZOFRAN) injection 4 mg, 4 mg, Intravenous, Q6H PRN, Anny Chappell APRN    pantoprazole (PROTONIX) EC tablet 40 mg, 40 mg, Oral, Q AM, Anny Chappell APRN, 40 mg at 06/10/24 0600    Phosphorus Replacement - Follow Nurse / BPA Driven Protocol, , Does not apply, PRN, Anny Chappell APRN    Potassium Replacement - Follow Nurse / BPA Driven Protocol, , Does not apply, PRN, Anny Chappell, APRN    sodium chloride 0.9 % bolus 2,925 mL, 30 mL/kg, Intravenous, Once, Nicolas Meraz PA-C    sodium chloride 0.9 % flush 10 mL, 10 mL, Intravenous, PRN, Hiwot Abraham MD    [COMPLETED] Insert Peripheral IV, , , Once **AND** sodium chloride 0.9 % flush 10 mL, 10 mL, Intravenous, PRN, Nicolas Meraz PA-C    [COMPLETED] Insert Peripheral IV, , , Once **AND** sodium chloride 0.9 % flush 10 mL, 10 mL, Intravenous, PRN, Nicolas Meraz PA-C    sodium chloride 0.9 % flush 10 mL, 10 mL, Intravenous, Q12H, Anny Chappell APRN, 10 mL at 06/10/24 0906    sodium chloride 0.9 % flush 10 mL, 10 mL, Intravenous, PRN, Anny Chappell D, APRN    sodium chloride 0.9 % infusion 40 mL, 40 mL, Intravenous, PRN, Anny Chappell, APRN    Assessment/Plan:   No signs or symptoms of obstructive uropathy on my review.  He does appear to have mild extrarenal pelvis bilaterally which is a normal anatomic variant.  Possible right  "renal \"pseudotumor\" for which I recommend outpatient urology further evaluation with three-phase renal CT scan when medically improved.  Patient is here in the area visiting a friend and normally resides close to [place] and can be followed there at [facility].  He will continue get his annual PSAs checked by his primary care.  Urology will sign off please call with any questions          (Please note that portions of this note were completed with a voice recognition program.)  Jose Gregg MD  06/10/24  10:54 CDT            Electronically signed by Jose Gregg MD at 06/10/24 1058       "

## 2024-06-10 NOTE — ED NOTES
Nursing report ED to floor  Rome Justice  73 y.o.  male    HPI:   Chief Complaint   Patient presents with    Abdominal Pain    Vomiting    Shoulder Pain       Admitting doctor:   Hiwot Abraham MD    Consulting provider(s):  Consults       No orders found for last 30 day(s).             Admitting diagnosis:   The primary encounter diagnosis was Atrial fibrillation with RVR. Diagnoses of Sepsis, due to unspecified organism, unspecified whether acute organ dysfunction present, KYRA (acute kidney injury), Pneumonia of left lower lobe due to infectious organism, and Hypoxia were also pertinent to this visit.    Code status:   Current Code Status       Date Active Code Status Order ID Comments User Context       Not on file            Allergies:   Patient has no known allergies.    Intake and Output  No intake or output data in the 24 hours ending 06/10/24 0212    Weight:       06/09/24  2100   Weight: 97.5 kg (215 lb)       Most recent vitals:   Vitals:    06/10/24 0131 06/10/24 0200 06/10/24 0201 06/10/24 0211   BP: 107/70  104/72    Pulse: (!) 154  (!) 155    Resp:       Temp:    98.1 °F (36.7 °C)   TempSrc:    Oral   SpO2: 91% 91%     Weight:       Height:         Oxygen Therapy: .    Active LDAs/IV Access:   Lines, Drains & Airways       Active LDAs       Name Placement date Placement time Site Days    Peripheral IV 06/09/24 2149 Posterior;Right Hand 06/09/24 2149  Hand  less than 1    Peripheral IV 06/10/24 0127 Left;Posterior Hand 06/10/24  0127  Hand  less than 1                    Labs (abnormal labs have a star):   Labs Reviewed   COMPREHENSIVE METABOLIC PANEL - Abnormal; Notable for the following components:       Result Value    Glucose 122 (*)     BUN 35 (*)     Creatinine 1.40 (*)     Sodium 134 (*)     Total Bilirubin 1.6 (*)     eGFR 53.7 (*)     All other components within normal limits    Narrative:     GFR Normal >60  Chronic Kidney Disease <60  Kidney Failure <15    The GFR formula is only valid  "for adults with stable renal function between ages 18 and 70.   LACTIC ACID, PLASMA - Abnormal; Notable for the following components:    Lactate 3.3 (*)     All other components within normal limits   URINALYSIS W/ CULTURE IF INDICATED - Abnormal; Notable for the following components:    Color, UA Orange (*)     Ketones, UA Trace (*)     Blood, UA Moderate (2+) (*)     Protein,  mg/dL (2+) (*)     Leuk Esterase, UA Trace (*)     All other components within normal limits    Narrative:     Dipstick results may be inaccurate due to color interference.   CBC WITH AUTO DIFFERENTIAL - Abnormal; Notable for the following components:    WBC 24.91 (*)     All other components within normal limits   PROCALCITONIN - Abnormal; Notable for the following components:    Procalcitonin 11.96 (*)     All other components within normal limits    Narrative:     As a Marker for Sepsis (Non-Neonates):    1. <0.5 ng/mL represents a low risk of severe sepsis and/or septic shock.  2. >2 ng/mL represents a high risk of severe sepsis and/or septic shock.    As a Marker for Lower Respiratory Tract Infections that require antibiotic therapy:    PCT on Admission    Antibiotic Therapy       6-12 Hrs later    >0.5                Strongly Recommended  >0.25 - <0.5        Recommended   0.1 - 0.25          Discouraged              Remeasure/reassess PCT  <0.1                Strongly Discouraged     Remeasure/reassess PCT    As 28 day mortality risk marker: \"Change in Procalcitonin Result\" (>80% or <=80%) if Day 0 (or Day 1) and Day 4 values are available. Refer to http://www.[facility]-pct-calculator.com    Change in PCT <=80%  A decrease of PCT levels below or equal to 80% defines a positive change in PCT test result representing a higher risk for 28-day all-cause mortality of patients diagnosed with severe sepsis for septic shock.    Change in PCT >80%  A decrease of PCT levels of more than 80% defines a negative change in PCT result representing " a lower risk for 28-day all-cause mortality of patients diagnosed with severe sepsis or septic shock.      MANUAL DIFFERENTIAL - Abnormal; Notable for the following components:    Neutrophil % 76.2 (*)     Lymphocyte % 3.0 (*)     Monocyte % 4.0 (*)     Eosinophil % 0.0 (*)     Bands %  15.8 (*)     Neutrophils Absolute 22.94 (*)     Monocytes Absolute 1.00 (*)     All other components within normal limits   URINALYSIS, MICROSCOPIC ONLY - Abnormal; Notable for the following components:    WBC, UA 3-5 (*)     Bacteria, UA Trace (*)     All other components within normal limits   LIPASE - Normal   LACTIC ACID, REFLEX - Normal   BLOOD CULTURE   BLOOD CULTURE   RAINBOW DRAW    Narrative:     The following orders were created for panel order [place] [facility].  Procedure                               Abnormality         Status                     ---------                               -----------         ------                     Green Top (Gel)[[id number]]                                  Final result               Lavender Top[[id number]]                                     Final result               Red Top[[id number]]                                          Final result               Zarco Top[[id number]]                                         Final result               Light Blue Top[[id number]]                                   Final result                 Please view results for these tests on the individual orders.   CBC AND DIFFERENTIAL    Narrative:     The following orders were created for panel order CBC & Differential.  Procedure                               Abnormality         Status                     ---------                               -----------         ------                     CBC Auto Differential[[id number]]        Abnormal            Final result                 Please view results for these tests on the individual orders.   GREEN TOP   LAVENDER TOP   RED TOP   GRAY TOP   LIGHT BLUE TOP       Meds  given in ED:   Medications   sodium chloride 0.9 % flush 10 mL (has no administration in time range)   sodium chloride 0.9 % flush 10 mL (has no administration in time range)   sodium chloride 0.9 % flush 10 mL (has no administration in time range)   dilTIAZem (CARDIZEM) 125 mg in 125 mL D5W infusion (7.5 mg/hr Intravenous Rate/Dose Change 6/10/24 0116)   piperacillin-tazobactam (ZOSYN) 3.375 g IVPB in 100 mL NS MBP (CD) (has no administration in time range)   vancomycin IVPB 2000 mg in 0.9% Sodium Chloride 500 mL (2,000 mg Intravenous New Bag 6/10/24 0126)   sodium chloride 0.9 % bolus 2,925 mL (has no administration in time range)   metoprolol tartrate (LOPRESSOR) tablet 50 mg (has no administration in time range)   sodium chloride 0.9 % bolus 1,000 mL (0 mL Intravenous Stopped 6/10/24 0055)   ondansetron (ZOFRAN) injection 4 mg (4 mg Intravenous Given 6/9/24 2235)   morphine injection 4 mg (4 mg Intravenous Given 6/9/24 2235)   acetaminophen (TYLENOL) tablet 1,000 mg (1,000 mg Oral Given 6/9/24 2252)   dilTIAZem (CARDIZEM) injection 24.4 mg (24.4 mg Intravenous Given 6/9/24 2308)   sodium chloride 0.9 % bolus 1,000 mL (0 mL Intravenous Stopped 6/10/24 0115)   iopamidol (ISOVUE-300) 61 % injection 100 mL (100 mL Intravenous Given 6/9/24 2334)     dilTIAZem, 5-15 mg/hr, Last Rate: 7.5 mg/hr (06/10/24 0116)         NIH Stroke Scale:       Isolation/Infection(s):  No active isolations   No active infections     COVID Testing  Collected .  Resulted .    Nursing report ED to floor:  Mental status: . AXOX4  Ambulatory status: .ambulatory    Precautions: .    ED nurse phone extentsion- ..

## 2024-06-10 NOTE — PROGRESS NOTES
"Pharmacy Dosing Service  Pharmacokinetics  Vancomycin Initial Evaluation  Assessment/Action/Plan:  Loading dose?: 2000 mg (given in ED)  Current Order: Vancomycin 1500 mg IVPB every 24 hours  Current end date:06/14/24  Levels: first trough - 06/12  Additional antimicrobial agent(s): Zosyn  MRSA Nasal PCR ordered: Yes (Vancomycin indication is pneumonia, bone/joint, SSTI or IAI)    Vancomycin dosage initiated based on population pharmacokinetic parameters. Pharmacy will continue to follow daily and adjust dose accordingly.     Subjective:  Rmoe Justice is a 73 y.o. male with a Vancomycin \"Pharmacy to Dose\" consult for the treatment of pneumonia/sepsis , day 1 of 5 of treatment.    AUC Model Data:  Loading dose: N/A  Regimen: 1500 mg IV every 24 hours.  Start time: 14:26 on 06/10/2024  Exposure target: AUC24 (range)400-600 mg/L.hr   AUC24,ss: 521 mg/L.hr  PAUC*: 78 %  Ctrough,ss: 15.9 mg/L  Pconc*: 30 %  Tox.: 11 %    Objective:  Ht: 180.3 cm (71\"); Wt: 99.3 kg (218 lb 14.7 oz)  Estimated Creatinine Clearance: 58.1 mL/min (A) (by C-G formula based on SCr of 1.4 mg/dL (H)).   Creatinine   Date Value Ref Range Status   06/09/2024 1.40 (H) 0.76 - 1.27 mg/dL Final      Lab Results   Component Value Date    WBC 19.60 (H) 06/10/2024    WBC 24.91 (H) 06/09/2024      Baseline culture results:  Microbiology Results (last 10 days)       ** No results found for the last 240 hours. **            Osvaldo Lowery, PharmD  06/10/24 03:31 CDT   "

## 2024-06-10 NOTE — PLAN OF CARE
Goal Outcome Evaluation:              Outcome Evaluation: Pt admitted to unit with a-fib RVR. Cardizem currently off, PO lopressor administered. Rate currently 110s-120s. Afebrile. C/o pleuritic pain on left side upon inhalation. 6L salter. Safety maintained.    Problem: Adult Inpatient Plan of Care  Goal: Plan of Care Review  Outcome: Ongoing, Progressing  Flowsheets (Taken 6/10/2024 0730)  Outcome Evaluation: Pt admitted to unit with a-fib RVR. Cardizem currently off, PO lopressor administered. Rate currently 110s-120s. Afebrile. C/o pleuritic pain on left side upon inhalation. 6L salter. Safety maintained.  Goal: Patient-Specific Goal (Individualized)  Outcome: Ongoing, Progressing  Goal: Absence of Hospital-Acquired Illness or Injury  Outcome: Ongoing, Progressing  Intervention: Identify and Manage Fall Risk  Recent Flowsheet Documentation  Taken 6/10/2024 0600 by Blanco Haney RN  Safety Promotion/Fall Prevention: safety round/check completed  Taken 6/10/2024 0500 by Blanco Haney RN  Safety Promotion/Fall Prevention: safety round/check completed  Taken 6/10/2024 0400 by Blanco Haney RN  Safety Promotion/Fall Prevention: safety round/check completed  Taken 6/10/2024 0300 by Blanco Haney RN  Safety Promotion/Fall Prevention: safety round/check completed  Taken 6/10/2024 0238 by Blanco Haney RN  Safety Promotion/Fall Prevention: safety round/check completed  Intervention: Prevent Skin Injury  Recent Flowsheet Documentation  Taken 6/10/2024 0600 by Blanco Haney RN  Body Position: position changed independently  Taken 6/10/2024 0400 by Blanco Haney RN  Body Position: position changed independently  Intervention: Prevent and Manage VTE (Venous Thromboembolism) Risk  Recent Flowsheet Documentation  Taken 6/10/2024 0238 by Blanco Haney RN  Activity Management: bedrest  VTE Prevention/Management: (see MAR) other (see comments)  Goal: Optimal Comfort and Wellbeing  Outcome: Ongoing,  Progressing  Intervention: Provide Person-Centered Care  Recent Flowsheet Documentation  Taken 6/10/2024 0238 by Blanco Haney RN  Trust Relationship/Rapport:   care explained   choices provided  Goal: Readiness for Transition of Care  Outcome: Ongoing, Progressing  Intervention: Mutually Develop Transition Plan  Recent Flowsheet Documentation  Taken 6/10/2024 0307 by Blanco Haney RN  Transportation Anticipated:   car, drives self   family or friend will provide  Patient/Family Anticipated Services at Transition: none  Patient/Family Anticipates Transition to:   home   home with family  Taken 6/10/2024 0306 by Blanco Haney RN  Equipment Currently Used at Home: none     Problem: Adjustment to Illness (Sepsis/Septic Shock)  Goal: Optimal Coping  Outcome: Ongoing, Progressing     Problem: Bleeding (Sepsis/Septic Shock)  Goal: Absence of Bleeding  Outcome: Ongoing, Progressing     Problem: Glycemic Control Impaired (Sepsis/Septic Shock)  Goal: Blood Glucose Level Within Desired Range  Outcome: Ongoing, Progressing     Problem: Infection Progression (Sepsis/Septic Shock)  Goal: Absence of Infection Signs and Symptoms  Outcome: Ongoing, Progressing  Intervention: Promote Recovery  Recent Flowsheet Documentation  Taken 6/10/2024 0238 by Blanco Haney RN  Activity Management: bedrest     Problem: Nutrition Impaired (Sepsis/Septic Shock)  Goal: Optimal Nutrition Intake  Outcome: Ongoing, Progressing     Problem: Asthma Comorbidity  Goal: Maintenance of Asthma Control  Outcome: Ongoing, Progressing  Intervention: Maintain Asthma Symptom Control  Recent Flowsheet Documentation  Taken 6/10/2024 0600 by Blanco Haney RN  Medication Review/Management: medications reviewed  Taken 6/10/2024 0500 by Blanco Haney RN  Medication Review/Management: medications reviewed  Taken 6/10/2024 0400 by Blanco Haney RN  Medication Review/Management: medications reviewed  Taken 6/10/2024 0300 by Blanco Haney RN  Medication  Review/Management: medications reviewed  Taken 6/10/2024 0238 by Blanco Haney RN  Medication Review/Management:   medications reviewed   dosing adjusted     Problem: Behavioral Health Comorbidity  Goal: Maintenance of Behavioral Health Symptom Control  Outcome: Ongoing, Progressing  Intervention: Maintain Behavioral Health Symptom Control  Recent Flowsheet Documentation  Taken 6/10/2024 0600 by Blanco Haney RN  Medication Review/Management: medications reviewed  Taken 6/10/2024 0500 by Blanco Haney RN  Medication Review/Management: medications reviewed  Taken 6/10/2024 0400 by Blanco Haney RN  Medication Review/Management: medications reviewed  Taken 6/10/2024 0300 by Blanco Haney RN  Medication Review/Management: medications reviewed  Taken 6/10/2024 0238 by Blanco Haney RN  Medication Review/Management:   medications reviewed   dosing adjusted     Problem: COPD (Chronic Obstructive Pulmonary Disease) Comorbidity  Goal: Maintenance of COPD Symptom Control  Outcome: Ongoing, Progressing  Intervention: Maintain COPD-Symptom Control  Recent Flowsheet Documentation  Taken 6/10/2024 0600 by Blanco Haney RN  Medication Review/Management: medications reviewed  Taken 6/10/2024 0500 by Blanco Haney RN  Medication Review/Management: medications reviewed  Taken 6/10/2024 0400 by Blanco Haney RN  Medication Review/Management: medications reviewed  Taken 6/10/2024 0300 by Blanco Haney RN  Medication Review/Management: medications reviewed  Taken 6/10/2024 0238 by Blanco Haney RN  Medication Review/Management:   medications reviewed   dosing adjusted     Problem: Diabetes Comorbidity  Goal: Blood Glucose Level Within Targeted Range  Outcome: Ongoing, Progressing     Problem: Heart Failure Comorbidity  Goal: Maintenance of Heart Failure Symptom Control  Outcome: Ongoing, Progressing  Intervention: Maintain Heart Failure-Management  Recent Flowsheet Documentation  Taken 6/10/2024 0600 by Lyndon  LELA Simeon  Medication Review/Management: medications reviewed  Taken 6/10/2024 0500 by Blanco Haney RN  Medication Review/Management: medications reviewed  Taken 6/10/2024 0400 by Blanco Haney RN  Medication Review/Management: medications reviewed  Taken 6/10/2024 0300 by Blanco Haney RN  Medication Review/Management: medications reviewed  Taken 6/10/2024 0238 by Blanco Haney RN  Medication Review/Management:   medications reviewed   dosing adjusted     Problem: Hypertension Comorbidity  Goal: Blood Pressure in Desired Range  Outcome: Ongoing, Progressing  Intervention: Maintain Blood Pressure Management  Recent Flowsheet Documentation  Taken 6/10/2024 0600 by Blanco Haney RN  Medication Review/Management: medications reviewed  Taken 6/10/2024 0500 by Blanco Hanye RN  Medication Review/Management: medications reviewed  Taken 6/10/2024 0400 by Blanco Haney RN  Medication Review/Management: medications reviewed  Taken 6/10/2024 0300 by Blanco Haney RN  Medication Review/Management: medications reviewed  Taken 6/10/2024 0238 by Blanco Haney RN  Medication Review/Management:   medications reviewed   dosing adjusted     Problem: Obstructive Sleep Apnea Risk or Actual Comorbidity Management  Goal: Unobstructed Breathing During Sleep  Outcome: Ongoing, Progressing     Problem: Osteoarthritis Comorbidity  Goal: Maintenance of Osteoarthritis Symptom Control  Outcome: Ongoing, Progressing  Intervention: Maintain Osteoarthritis Symptom Control  Recent Flowsheet Documentation  Taken 6/10/2024 0600 by Blanco Haney RN  Medication Review/Management: medications reviewed  Taken 6/10/2024 0500 by Blanco Haney RN  Medication Review/Management: medications reviewed  Taken 6/10/2024 0400 by Blanco Haney RN  Medication Review/Management: medications reviewed  Taken 6/10/2024 0300 by Blanco Haney RN  Medication Review/Management: medications reviewed  Taken 6/10/2024 0238 by Blanco Haney  RN  Activity Management: bedrest  Medication Review/Management:   medications reviewed   dosing adjusted     Problem: Pain Chronic (Persistent) (Comorbidity Management)  Goal: Acceptable Pain Control and Functional Ability  Outcome: Ongoing, Progressing  Intervention: Manage Persistent Pain  Recent Flowsheet Documentation  Taken 6/10/2024 0600 by Blanco Haney RN  Medication Review/Management: medications reviewed  Taken 6/10/2024 0500 by Blanco Haney RN  Medication Review/Management: medications reviewed  Taken 6/10/2024 0400 by Blanco Haney RN  Medication Review/Management: medications reviewed  Taken 6/10/2024 0300 by Blanco Haney RN  Medication Review/Management: medications reviewed  Taken 6/10/2024 0238 by Blanco Haney RN  Medication Review/Management:   medications reviewed   dosing adjusted     Problem: Seizure Disorder Comorbidity  Goal: Maintenance of Seizure Control  Outcome: Ongoing, Progressing

## 2024-06-10 NOTE — NURSING NOTE
[facility]  INPATIENT WOUND & OSTOMY CARE    Today's Date: 06/10/24    Patient Name: Rome Justice  MRN: [id number]  [facility]: [id number]  PCP: Provider, No Known  Attending Provider: Hiwot Abraham MD  Length of Stay: 0    I placed pressure injury prevention measure orders per protocol due to patient being at risk for skin breakdown and being admitted to the unit. Please reach out to wound care nurse if any skin issues arise.       This document has been electronically signed by Gilma Crockett RN on 6/10/2024 07:18 CDT

## 2024-06-10 NOTE — CONSULTS
"Urology  Length of Stay: 0  Patient Care Team:  Provider, No Known as PCP - General    Chief Complaint: Possible ureteral fullness on CT scan    Subjective     Interval History:   73-year-old male with history of radiation therapy for prostate cancer many years ago in [place] admitted for pneumonia incidentally found on his CT abdomen pelvis with contrast to have possible bilateral fullness of his collecting systems on the radiology report.  Patient  denies any urinary difficulty.  No hematuria or dysuria.  His bladder scan PVR was only 5 cc after voiding this morning.  I reviewed his CT scan and he has some mild extrarenal pelves bilaterally without stone or hydronephrosis on my review.  He does have a small subtle renal cortical irregularity on the right consistent with possible \"pseudotumor\" no discrete masses identifiable.    I independently visualized and reviewed the patient's prior imaging studies today in clinic and discussed the imaging findings with the patient.      Review of Systems:   Review of Systems   Respiratory:  Positive for shortness of breath.    All other systems reviewed and are negative.      Objective       Intake/Output Summary (Last 24 hours) at 6/10/2024 1054  Last data filed at 6/10/2024 0905  Gross per 24 hour   Intake 1394.75 ml   Output 400 ml   Net 994.75 ml       Vital Signs  Temp:  [97.7 °F (36.5 °C)-100.3 °F (37.9 °C)] 97.7 °F (36.5 °C)  Heart Rate:  [] 101  Resp:  [16] 16  BP: ()/(49-74) 90/71    Physical Exam:  Physical Exam  Constitutional: Well nourished, Well developed; No apparent distress.  His vital signs are reviewed  Psychiatric: Appropriate affect; Alert and oriented  Eyes: Unremarkable  Musculoskeletal: Normal gait and station  GI: Abdomen is soft, nontender  Respiratory: No distress; Unlabored movement; No accessory musculature needed with symmetric movements  Skin: No pallor or diaphoresis  : Penis and testicles are normal;      Results Review:  "      I reviewed the patient's new clinical results.  Lab Results (last 24 hours)       Procedure Component Value Units Date/Time    Legionella Antigen, Urine - Urine, Urine, Clean Catch [[id number]] Collected: 06/10/24 0932    Specimen: Urine, Clean Catch Updated: 06/10/24 0942    High Sensitivity Troponin T 2Hr [[id number]]  (Normal) Collected: 06/10/24 0626    Specimen: Blood Updated: 06/10/24 0655     HS Troponin T 18 ng/L      Troponin T Delta 1 ng/L     Narrative:      High Sensitive Troponin T Reference Range:  <14.0 ng/L- Negative Female for AMI  <22.0 ng/L- Negative Male for AMI  >=14 - Abnormal Female indicating possible myocardial injury.  >=22 - Abnormal Male indicating possible myocardial injury.   Clinicians would have to utilize clinical acumen, EKG, Troponin, and serial changes to determine if it is an Acute Myocardial Infarction or myocardial injury due to an underlying chronic condition.         MRSA Screen, PCR (Inpatient) - Swab, Nares [[id number]]  (Normal) Collected: 06/10/24 0417    Specimen: Swab from Nares Updated: 06/10/24 0609     MRSA PCR No MRSA Detected    Narrative:      The negative predictive value of this diagnostic test is high and should only be used to consider de-escalating anti-MRSA therapy. A positive result may indicate colonization with MRSA and must be correlated clinically.    Respiratory Panel PCR w/COVID-19(SARS-CoV-2) [facility]DA/[facility]Y/ELVIRA/[facility] In-House, NP Swab in UTM/VTM, 2 HR TAT - Swab, Nasopharynx [[id number]]  (Normal) Collected: 06/10/24 0417    Specimen: Swab from Nasopharynx Updated: 06/10/24 0543     ADENOVIRUS, PCR Not Detected     Coronavirus 229E Not Detected     Coronavirus HKU1 Not Detected     Coronavirus NL63 Not Detected     Coronavirus OC43 Not Detected     COVID19 Not Detected     Human Metapneumovirus Not Detected     Human Rhinovirus/Enterovirus Not Detected     Influenza A PCR Not Detected     Influenza B PCR Not Detected     Parainfluenza Virus 1 Not  Detected     Parainfluenza Virus 2 Not Detected     Parainfluenza Virus 3 Not Detected     Parainfluenza Virus 4 Not Detected     RSV, PCR Not Detected     Bordetella pertussis pcr Not Detected     Bordetella parapertussis PCR Not Detected     Chlamydophila pneumoniae PCR Not Detected     Mycoplasma pneumo by PCR Not Detected    Narrative:      In the setting of a positive respiratory panel with a viral infection PLUS a negative procalcitonin without other underlying concern for bacterial infection, consider observing off antibiotics or discontinuation of antibiotics and continue supportive care. If the respiratory panel is positive for atypical bacterial infection (Bordetella pertussis, Chlamydophila pneumoniae, or Mycoplasma pneumoniae), consider antibiotic de-escalation to target atypical bacterial infection.    High Sensitivity Troponin T [[id number]]  (Normal) Collected: 06/10/24 0428    Specimen: Blood Updated: 06/10/24 0516     HS Troponin T 17 ng/L     Narrative:      High Sensitive Troponin T Reference Range:  <14.0 ng/L- Negative Female for AMI  <22.0 ng/L- Negative Male for AMI  >=14 - Abnormal Female indicating possible myocardial injury.  >=22 - Abnormal Male indicating possible myocardial injury.   Clinicians would have to utilize clinical acumen, EKG, Troponin, and serial changes to determine if it is an Acute Myocardial Infarction or myocardial injury due to an underlying chronic condition.         Respiratory Culture - Sputum, Cough [[id number]] Collected: 06/10/24 0449    Specimen: Sputum from Cough Updated: 06/10/24 0459    Manual Differential [[id number]]  (Abnormal) Collected: 06/10/24 0305    Specimen: Blood Updated: 06/10/24 0408     Neutrophil % 71.0 %      Lymphocyte % 4.0 %      Monocyte % 2.0 %      Eosinophil % 0.0 %      Basophil % 1.0 %      Bands %  22.0 %      Neutrophils Absolute 18.23 10*3/mm3      Lymphocytes Absolute 0.78 10*3/mm3      Monocytes Absolute 0.39 10*3/mm3       Eosinophils Absolute 0.00 10*3/mm3      Basophils Absolute 0.20 10*3/mm3      Crenated RBC's Mod/2+     Poikilocytes Mod/2+     Dohle Bodies Present     Vacuolated Neutrophils Mod/2+     Platelet Morphology Normal    Basic Metabolic Panel [[id number]]  (Abnormal) Collected: 06/10/24 0305    Specimen: Blood Updated: 06/10/24 0346     Glucose 122 mg/dL      BUN 33 mg/dL      Creatinine 1.31 mg/dL      Sodium 134 mmol/L      Potassium 4.3 mmol/L      Chloride 102 mmol/L      CO2 21.0 mmol/L      Calcium 7.6 mg/dL      BUN/Creatinine Ratio 25.2     Anion Gap 11.0 mmol/L      eGFR 58.2 mL/min/1.73     Narrative:      GFR Normal >60  Chronic Kidney Disease <60  Kidney Failure <15    The GFR formula is only valid for adults with stable renal function between ages 18 and 70.    Magnesium [[id number]]  (Abnormal) Collected: 06/10/24 0305    Specimen: Blood Updated: 06/10/24 0346     Magnesium 1.5 mg/dL     Phosphorus [[id number]]  (Normal) Collected: 06/10/24 0305    Specimen: Blood Updated: 06/10/24 0346     Phosphorus 2.5 mg/dL     Protime-INR [[id number]]  (Abnormal) Collected: 06/10/24 0305    Specimen: Blood Updated: 06/10/24 0335     Protime 20.2 Seconds      INR 1.66    CBC & Differential [[id number]]  (Abnormal) Collected: 06/10/24 0305    Specimen: Blood Updated: 06/10/24 0327    Narrative:      The following orders were created for panel order CBC & Differential.  Procedure                               Abnormality         Status                     ---------                               -----------         ------                     CBC Auto Differential[[id number]]        Abnormal            Final result                 Please view results for these tests on the individual orders.    CBC Auto Differential [[id number]]  (Abnormal) Collected: 06/10/24 0305    Specimen: Blood Updated: 06/10/24 0327     WBC 19.60 10*3/mm3      RBC 3.99 10*6/mm3      Hemoglobin 12.2 g/dL      Hematocrit 37.1 %      MCV 93.0 fL      MCH 30.6  "pg      MCHC 32.9 g/dL      RDW 13.9 %      RDW-SD 47.8 fl      MPV 11.0 fL      Platelets 175 10*3/mm3      nRBC 0.0 /100 WBC     STAT Lactic Acid, Reflex [[id number]]  (Normal) Collected: 06/10/24 0114    Specimen: Blood from Arm, Left Updated: 06/10/24 0158     Lactate 2.0 mmol/L     Blood Culture - Blood, Arm, Right [[id number]] Collected: 06/10/24 0122    Specimen: Blood from Arm, Right Updated: 06/10/24 0145    Blood Culture - Blood, Hand, Left [[id number]] Collected: 06/10/24 0114    Specimen: Blood from Hand, Left Updated: 06/10/24 0145    Procalcitonin [[id number]]  (Abnormal) Collected: 06/09/24 2246    Specimen: Blood Updated: 06/09/24 2317     Procalcitonin 11.96 ng/mL     Narrative:      As a Marker for Sepsis (Non-Neonates):    1. <0.5 ng/mL represents a low risk of severe sepsis and/or septic shock.  2. >2 ng/mL represents a high risk of severe sepsis and/or septic shock.    As a Marker for Lower Respiratory Tract Infections that require antibiotic therapy:    PCT on Admission    Antibiotic Therapy       6-12 Hrs later    >0.5                Strongly Recommended  >0.25 - <0.5        Recommended   0.1 - 0.25          Discouraged              Remeasure/reassess PCT  <0.1                Strongly Discouraged     Remeasure/reassess PCT    As 28 day mortality risk marker: \"Change in Procalcitonin Result\" (>80% or <=80%) if Day 0 (or Day 1) and Day 4 values are available. Refer to http://www.[employer]-pct-calculator.com    Change in PCT <=80%  A decrease of PCT levels below or equal to 80% defines a positive change in PCT test result representing a higher risk for 28-day all-cause mortality of patients diagnosed with severe sepsis for septic shock.    Change in PCT >80%  A decrease of PCT levels of more than 80% defines a negative change in PCT result representing a lower risk for 28-day all-cause mortality of patients diagnosed with severe sepsis or septic shock.       Comprehensive Metabolic Panel [[id number]]  " (Abnormal) Collected: 06/09/24 2246    Specimen: Blood Updated: 06/09/24 2311     Glucose 122 mg/dL      BUN 35 mg/dL      Creatinine 1.40 mg/dL      Sodium 134 mmol/L      Potassium 4.6 mmol/L      Chloride 99 mmol/L      CO2 23.0 mmol/L      Calcium 8.8 mg/dL      Total Protein 7.3 g/dL      Albumin 3.8 g/dL      ALT (SGPT) 14 U/L      AST (SGOT) 19 U/L      Alkaline Phosphatase 111 U/L      Total Bilirubin 1.6 mg/dL      Globulin 3.5 gm/dL      A/G Ratio 1.1 g/dL      BUN/Creatinine Ratio 25.0     Anion Gap 12.0 mmol/L      eGFR 53.7 mL/min/1.73     Narrative:      GFR Normal >60  Chronic Kidney Disease <60  Kidney Failure <15    The GFR formula is only valid for adults with stable renal function between ages 18 and 70.    Lipase [[id number]]  (Normal) Collected: 06/09/24 2246    Specimen: Blood Updated: 06/09/24 2306     Lipase 20 U/L     Urinalysis, Microscopic Only - Urine, Clean Catch [[id number]]  (Abnormal) Collected: 06/09/24 2142    Specimen: Urine, Clean Catch Updated: 06/09/24 2236     RBC, UA 0-2 /HPF      WBC, UA 3-5 /HPF      Comment: Urine culture not indicated.        Bacteria, UA Trace /HPF      Squamous Epithelial Cells, UA 0-2 /HPF      Hyaline Casts, UA 7-12 /LPF      Coarse Granular Casts, UA 3-6 /LPF      Methodology Manual Light Microscopy    Manual Differential [[id number]]  (Abnormal) Collected: 06/09/24 2148    Specimen: Blood Updated: 06/09/24 2228     Neutrophil % 76.2 %      Lymphocyte % 3.0 %      Monocyte % 4.0 %      Eosinophil % 0.0 %      Basophil % 0.0 %      Bands %  15.8 %      Atypical Lymphocyte % 1.0 %      Neutrophils Absolute 22.94 10*3/mm3      Lymphocytes Absolute 1.00 10*3/mm3      Monocytes Absolute 1.00 10*3/mm3      Eosinophils Absolute 0.00 10*3/mm3      Basophils Absolute 0.00 10*3/mm3      Crenated RBC's Mod/2+     Poikilocytes Mod/2+     Polychromasia Slight/1+     Dohle Bodies Present     Vacuolated Neutrophils Slight/1+     Platelet Morphology Normal    Urinalysis  With Culture If Indicated - Urine, Clean Catch [[id number]]  (Abnormal) Collected: 06/09/24 2142    Specimen: Urine, Clean Catch Updated: 06/09/24 2228     Color, UA Baxter     Appearance, UA Clear     pH, UA 5.5     Specific Gravity, UA 1.025     Glucose, UA Negative     Ketones, UA Trace     Bilirubin, UA Negative     Blood, UA Moderate (2+)     Protein,  mg/dL (2+)     Leuk Esterase, UA Trace     Nitrite, UA Negative     Urobilinogen, UA 1.0 E.U./dL    Narrative:      Dipstick results may be inaccurate due to color interference.    Lactic Acid, Plasma [[id number]]  (Abnormal) Collected: 06/09/24 2148    Specimen: Blood Updated: 06/09/24 2227     Lactate 3.3 mmol/L     CBC & Differential [[id number]]  (Abnormal) Collected: 06/09/24 2148    Specimen: Blood Updated: 06/09/24 2222    Narrative:      The following orders were created for panel order CBC & Differential.  Procedure                               Abnormality         Status                     ---------                               -----------         ------                     CBC Auto Differential[[id number]]        Abnormal            Final result                 Please view results for these tests on the individual orders.    CBC Auto Differential [[id number]]  (Abnormal) Collected: 06/09/24 2148    Specimen: Blood Updated: 06/09/24 2222     WBC 24.91 10*3/mm3      RBC 4.26 10*6/mm3      Hemoglobin 13.1 g/dL      Hematocrit 39.5 %      MCV 92.7 fL      MCH 30.8 pg      MCHC 33.2 g/dL      RDW 14.1 %      RDW-SD 47.8 fl      MPV 11.1 fL      Platelets 200 10*3/mm3      nRBC 0.0 /100 WBC     [place] Draw [[id number]] Collected: 06/09/24 2148    Specimen: Blood Updated: 06/09/24 2201    Narrative:      The following orders were created for panel order [place] Draw.  Procedure                               Abnormality         Status                     ---------                               -----------         ------                     [facility]  (Gel)[[id number]]                                  Final result               Lavender Top[[id number]]                                     Final result               Red Top[[id number]]                                          Final result               Zarco Top[[id number]]                                         Final result               Light Blue Top[[id number]]                                   Final result                 Please view results for these tests on the individual orders.    Green Top (Gel) [[id number]] Collected: 06/09/24 2148    Specimen: Blood Updated: 06/09/24 2201     Extra Tube Hold for add-ons.     Comment: Auto resulted.       Red Top [[id number]] Collected: 06/09/24 2148    Specimen: Blood Updated: 06/09/24 2201     Extra Tube Hold for add-ons.     Comment: Auto resulted.       Zarco Top [[id number]] Collected: 06/09/24 2148    Specimen: Blood Updated: 06/09/24 2201     Extra Tube Hold for add-ons.     Comment: Auto resulted.       Light Blue Top [[id number]] Collected: 06/09/24 2148    Specimen: Blood Updated: 06/09/24 2201     Extra Tube Hold for add-ons.     Comment: Auto resulted       Lavender Top [[id number]] Collected: 06/09/24 2148    Specimen: Blood Updated: 06/09/24 2201     Extra Tube hold for add-on     Comment: Auto resulted             Imaging Results (Last 24 Hours)       Procedure Component Value Units Date/Time    CT Abdomen Pelvis With Contrast [[id number]] Collected: 06/10/24 0607     Updated: 06/10/24 0816    Narrative:      EXAMINATION: CT ABDOMEN PELVIS W CONTRAST-      6/9/2024 10:15 PM     HISTORY: LLQ, left flank, NV; I48.91-Unspecified atrial fibrillation     In order to have a CT radiation dose as low as reasonably achievable  Automated Exposure Control was utilized for adjustment of the mA and/or  KV according to patient size.     Total DLP = 416.95 mGy.cm     The CT scan of the abdomen and pelvis is performed after intravenous  contrast enhancement.     Images are acquired  in the axial plane and subsequent reconstruction in  coronal and sagittal planes.     There is no previous similar study for comparison.     The chest is separately reviewed and reported.     There is fatty infiltration of the liver. No focal intrinsic  abnormality. The spleen is normal with multiple small granulomas.     No radiopaque gallstones.     Pancreas is normal.     The adrenal glands bilaterally are normal.     There is moderate lobulation of renal contour bilaterally. There is mild  irregularity and focal bulge along the mid right lateral kidney which  may represent a hypertrophied column of Solo or asymmetrical cortical  hypertrophy?. It measures approximately 1.8 cm in diameter. No  radiopaque calculi. There is mild ectasia of the collecting system  bilaterally involving the proximal ureters on both sides. There is  surrounding retroperitoneal fat infiltration. No calculi in either  ureter. Urinary bladder is poorly distended. No intrinsic abnormality.     Prostate is not enlarged. There is intrinsic calcification. There are  small metallic objects in the prostate with streak artifact. This may  represent radiotherapy seeds.     There is a small fat-containing umbilical hernia.     There is small hiatal hernia. Stomach and duodenum are unremarkable.  Small bowel is nondistended. Appendix is not visualized. No finding to  suggest appendicitis. Moderate gas and stool is seen in the colon. There  is diverticulosis of the colon. No evidence of diverticulitis.     Atheromatous change of the abdominal aorta and iliac arteries. No  aneurysmal dilatation.     There is no evidence of abdominal or pelvic lymphadenopathy.     Images reviewed in bone window show chronic degenerative changes of the  lumbar spine with mild dextroscoliosis. Small focus of bony sclerosis  involving the right iliac bone adjacent to the right sacroiliac joint  and similar finding in the adjacent sacrum may represent bone  islands?.  Possibility of metastatic disease is not excluded.       Impression:      1. A mild symmetrical ectasia of the renal collecting system bilaterally  may represent a normal variation, chronic pyelonephritis or  vesicoureteral reflux disease?. No calculi. No finding to suggest  obstruction. This may be clinically correlated and further evaluated.  2. An apparent irregularity of the right renal parenchyma/cortex in the  midpole may represent a pseudotumor?. However possibility of a  neoplastic process may not be excluded. A follow-up contrast enhanced CT  or MR imaging of the abdomen with renal mass protocol may be obtained.  3. Other nonacute findings as above.  4. The chest is separately reviewed and reported.     The above study was initially reviewed and reported by [facility]. The  above-mentioned discrepancy was reported to ER physician at 8:13 a.m.                                   This report was signed and finalized on 6/10/2024 8:13 AM by Dr. Muriel Dixon MD.       CT Chest Without Contrast Diagnostic [[id number]] Collected: 06/10/24 0600     Updated: 06/10/24 0723    Narrative:      EXAMINATION: CT CHEST WO CONTRAST DIAGNOSTIC-      6/10/2024 1:15 AM     HISTORY: left pna; I48.91-Unspecified atrial fibrillation; A41.9-Sepsis,  unspecified organism; N17.9-Acute kidney failure, unspecified;  J18.9-Pneumonia, unspecified organism; R09.02-Hypoxemia     In order to have a CT radiation dose as low as reasonably achievable  Automated Exposure Control was utilized for adjustment of the mA and/or  KV according to patient size.     Total DLP = 221.38 mGy.cm     The CT scan of the chest is performed without intravenous contrast  enhancement.     Images are acquired in the axial plane and subsequent reconstruction in  coronal and sagittal planes.     There is no previous similar study for comparison.     There are areas of consolidation with air bronchograms involving the  left upper lobe posteriorly and the  left lower lobe.     A smaller infiltrate is seen in the right parahilar area and right lower  lobe.     There is a small bibasilar pleural effusion.     There are atelectatic changes in the right middle lobe.     The Central airway is patent. No endobronchial abnormality is noted.     There are a few calcified granulomas in the lungs bilaterally.     Limited visualized soft tissue of the neck are unremarkable.     The thyroid gland is suboptimally visualized and not well evaluated.  There is a probable nodule in the left lobe.     There is no axillary lymphadenopathy.     Atheromatous change of thoracic aorta noted. No aneurysmal dilatation.     Moderate ectasia of the central pulmonary arteries represent pulmonary  arterial hypertension.     There are a few borderline prominent mediastinal lymph nodes. The  largest lymph node in the left hilum, level 5, measures 9 mm in short  axis. There are calcified granulomas in the mediastinum and right hilum.  The hilar lymph nodes are not well visualized or evaluated in this study  due to lack of contrast enhancement.     Atheromatous change of coronary arteries are noted.     There is moderate cardiomegaly.     There is a small hiatal hernia.     The limited visualized unenhanced abdomen is unremarkable and there is a  residual contrast in the renal collecting system bilaterally from a  previous contrast enhanced CT scan of the abdomen and pelvis.     Images reviewed in bone windows show chronic degenerative changes of the  thoracic spine. No acute bony abnormality.       Impression:      1. Bilateral lung infiltrate, significantly more extensive on the left,  represent an acute inflammatory/infectious process.  2. Small bibasilar pleural effusion.  3. Nonspecific borderline mediastinal lymphadenopathy probably represent  reactive adenopathy to the infiltrate.     The above study was initially reviewed and reported by [facility]. I do not  find any discrepancies.                                                         This report was signed and finalized on 6/10/2024 7:20 AM by Dr. Muirel Dixon MD.               Medication Review:     Current Facility-Administered Medications:     acetaminophen (TYLENOL) tablet 650 mg, 650 mg, Oral, Q4H PRN **OR** acetaminophen (TYLENOL) suppository 650 mg, 650 mg, Rectal, Q4H PRN, Anny Chappell, APRN    apixaban (ELIQUIS) tablet 5 mg, 5 mg, Oral, Q12H, Anny Chappell, APRN, 5 mg at 06/10/24 0906    atorvastatin (LIPITOR) tablet 40 mg, 40 mg, Oral, Nightly, Anny Chappell, APRN    azithromycin (ZITHROMAX) 500 mg in sodium chloride 0.9 % 250 mL IVPB-VTB, 500 mg, Intravenous, Q24H, nAny Chappell APRN, 500 mg at 06/10/24 0905    sennosides-docusate (PERICOLACE) 8.6-50 MG per tablet 2 tablet, 2 tablet, Oral, BID **AND** polyethylene glycol (MIRALAX) packet 17 g, 17 g, Oral, Daily PRN **AND** bisacodyl (DULCOLAX) EC tablet 5 mg, 5 mg, Oral, Daily PRN **AND** bisacodyl (DULCOLAX) suppository 10 mg, 10 mg, Rectal, Daily PRN, Anny Chappell, APRN    Calcium Replacement - Follow Nurse / BPA Driven Protocol, , Does not apply, PRN, Anny Cahppell APRN    cefTRIAXone (ROCEPHIN) 2,000 mg in sodium chloride 0.9 % 100 mL MBP, 2,000 mg, Intravenous, Q24H, Anny Chappell, APRN, Last Rate: 200 mL/hr at 06/10/24 0750, 2,000 mg at 06/10/24 0750    [START ON 6/11/2024] Chlorhexidine Gluconate Cloth 2 % pads 1 Application, 1 Application, Topical, Q24H, Anny Chappell, APRN    dilTIAZem (CARDIZEM) 125 mg in 125 mL D5W infusion, 5-15 mg/hr, Intravenous, Titrated, Nicolas Meraz PA-C, Stopped at 06/10/24 0521    lactated ringers infusion, 100 mL/hr, Intravenous, Continuous, Anny Chappell APRN, Last Rate: 100 mL/hr at 06/10/24 0637, 100 mL/hr at 06/10/24 0637    Magnesium Standard Dose Replacement - Follow Nurse / BPA Driven Protocol, , Does not apply, PRN, Anny Chappell, APRN    metoprolol tartrate (LOPRESSOR) tablet 50 mg, 50 mg, Oral, Q12H,  "Anny Chappell, APRN, 50 mg at 06/10/24 0906    mupirocin (BACTROBAN) 2 % nasal ointment 1 Application, 1 Application, Each Nare, BID, Anny Chappell, APRN, 1 Application at 06/10/24 0905    nitroglycerin (NITROSTAT) SL tablet 0.4 mg, 0.4 mg, Sublingual, Q5 Min PRN, Anny Chappell, APRN    ondansetron (ZOFRAN) injection 4 mg, 4 mg, Intravenous, Q6H PRN, Anny Chappell, APRN    pantoprazole (PROTONIX) EC tablet 40 mg, 40 mg, Oral, Q AM, Anny Chappell, APRN, 40 mg at 06/10/24 0600    Phosphorus Replacement - Follow Nurse / BPA Driven Protocol, , Does not apply, PRN, Anny Chappell, APRN    Potassium Replacement - Follow Nurse / BPA Driven Protocol, , Does not apply, PRN, Anny Chappell, APRN    sodium chloride 0.9 % bolus 2,925 mL, 30 mL/kg, Intravenous, Once, Nicolas Meraz PA-C    sodium chloride 0.9 % flush 10 mL, 10 mL, Intravenous, PRN, Hiwot Abraham MD    [COMPLETED] Insert Peripheral IV, , , Once **AND** sodium chloride 0.9 % flush 10 mL, 10 mL, Intravenous, PRN, Nicolas Meraz PA-C    [COMPLETED] Insert Peripheral IV, , , Once **AND** sodium chloride 0.9 % flush 10 mL, 10 mL, Intravenous, PRN, Nicolas Meraz PA-C    sodium chloride 0.9 % flush 10 mL, 10 mL, Intravenous, Q12H, Anny Chappell, APRN, 10 mL at 06/10/24 0906    sodium chloride 0.9 % flush 10 mL, 10 mL, Intravenous, PRN, Anny Chappell, APRN    sodium chloride 0.9 % infusion 40 mL, 40 mL, Intravenous, PRN, Mina Chappellya NU, APRN    Assessment/Plan:   No signs or symptoms of obstructive uropathy on my review.  He does appear to have mild extrarenal pelvis bilaterally which is a normal anatomic variant.  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  Patient is here in the area visiting a friend and normally resides close to [place] and can be followed there at [facility].  He will continue get his annual " PSAs checked by his primary care.  Urology will sign off please call with any questions          (Please note that portions of this note were completed with a voice recognition program.)  Jose Gregg MD  06/10/24  10:54 CDT

## 2024-06-11 ENCOUNTER — APPOINTMENT (OUTPATIENT)
Dept: GENERAL RADIOLOGY | Facility: HOSPITAL | Age: 74
DRG: 871 | End: 2024-06-11
Payer: MEDICARE

## 2024-06-11 LAB
ANION GAP SERPL CALCULATED.3IONS-SCNC: 10 MMOL/L (ref 5–15)
BASOPHILS # BLD AUTO: 0.04 10*3/MM3 (ref 0–0.2)
BASOPHILS NFR BLD AUTO: 0.2 % (ref 0–1.5)
BUN SERPL-MCNC: 37 MG/DL (ref 8–23)
BUN/CREAT SERPL: 31.9 (ref 7–25)
CALCIUM SPEC-SCNC: 8 MG/DL (ref 8.6–10.5)
CHLORIDE SERPL-SCNC: 103 MMOL/L (ref 98–107)
CO2 SERPL-SCNC: 22 MMOL/L (ref 22–29)
CREAT SERPL-MCNC: 1.16 MG/DL (ref 0.76–1.27)
DEPRECATED RDW RBC AUTO: 47.2 FL (ref 37–54)
EGFRCR SERPLBLD CKD-EPI 2021: 67.3 ML/MIN/1.73
EOSINOPHIL # BLD AUTO: 0.03 10*3/MM3 (ref 0–0.4)
EOSINOPHIL NFR BLD AUTO: 0.2 % (ref 0.3–6.2)
ERYTHROCYTE [DISTWIDTH] IN BLOOD BY AUTOMATED COUNT: 13.9 % (ref 12.3–15.4)
GLUCOSE SERPL-MCNC: 112 MG/DL (ref 65–99)
HCT VFR BLD AUTO: 36.9 % (ref 37.5–51)
HGB BLD-MCNC: 12.2 G/DL (ref 13–17.7)
IMM GRANULOCYTES # BLD AUTO: 0.09 10*3/MM3 (ref 0–0.05)
IMM GRANULOCYTES NFR BLD AUTO: 0.5 % (ref 0–0.5)
LYMPHOCYTES # BLD AUTO: 0.59 10*3/MM3 (ref 0.7–3.1)
LYMPHOCYTES NFR BLD AUTO: 3.3 % (ref 19.6–45.3)
MAGNESIUM SERPL-MCNC: 2.2 MG/DL (ref 1.6–2.4)
MCH RBC QN AUTO: 30.2 PG (ref 26.6–33)
MCHC RBC AUTO-ENTMCNC: 33.1 G/DL (ref 31.5–35.7)
MCV RBC AUTO: 91.3 FL (ref 79–97)
MONOCYTES # BLD AUTO: 1.05 10*3/MM3 (ref 0.1–0.9)
MONOCYTES NFR BLD AUTO: 5.8 % (ref 5–12)
NEUTROPHILS NFR BLD AUTO: 16.19 10*3/MM3 (ref 1.7–7)
NEUTROPHILS NFR BLD AUTO: 90 % (ref 42.7–76)
NRBC BLD AUTO-RTO: 0 /100 WBC (ref 0–0.2)
PHOSPHATE SERPL-MCNC: 1.9 MG/DL (ref 2.5–4.5)
PHOSPHATE SERPL-MCNC: 2.8 MG/DL (ref 2.5–4.5)
PLATELET # BLD AUTO: 211 10*3/MM3 (ref 140–450)
PMV BLD AUTO: 10.8 FL (ref 6–12)
POTASSIUM SERPL-SCNC: 4.3 MMOL/L (ref 3.5–5.2)
RBC # BLD AUTO: 4.04 10*6/MM3 (ref 4.14–5.8)
SODIUM SERPL-SCNC: 135 MMOL/L (ref 136–145)
WBC NRBC COR # BLD AUTO: 17.99 10*3/MM3 (ref 3.4–10.8)

## 2024-06-11 PROCEDURE — 84100 ASSAY OF PHOSPHORUS: CPT | Performed by: INTERNAL MEDICINE

## 2024-06-11 PROCEDURE — 25010000002 CEFTRIAXONE PER 250 MG: Performed by: NURSE PRACTITIONER

## 2024-06-11 PROCEDURE — 84100 ASSAY OF PHOSPHORUS: CPT | Performed by: NURSE PRACTITIONER

## 2024-06-11 PROCEDURE — 25810000003 LACTATED RINGERS PER 1000 ML: Performed by: NURSE PRACTITIONER

## 2024-06-11 PROCEDURE — 36415 COLL VENOUS BLD VENIPUNCTURE: CPT | Performed by: NURSE PRACTITIONER

## 2024-06-11 PROCEDURE — 94799 UNLISTED PULMONARY SVC/PX: CPT

## 2024-06-11 PROCEDURE — 85025 COMPLETE CBC W/AUTO DIFF WBC: CPT | Performed by: NURSE PRACTITIONER

## 2024-06-11 PROCEDURE — 80048 BASIC METABOLIC PNL TOTAL CA: CPT | Performed by: NURSE PRACTITIONER

## 2024-06-11 PROCEDURE — 71045 X-RAY EXAM CHEST 1 VIEW: CPT

## 2024-06-11 PROCEDURE — 25810000003 SODIUM CHLORIDE 0.9 % SOLUTION 250 ML FLEX CONT: Performed by: NURSE PRACTITIONER

## 2024-06-11 PROCEDURE — 94761 N-INVAS EAR/PLS OXIMETRY MLT: CPT

## 2024-06-11 PROCEDURE — 83735 ASSAY OF MAGNESIUM: CPT | Performed by: NURSE PRACTITIONER

## 2024-06-11 PROCEDURE — 25010000002 AZITHROMYCIN PER 500 MG: Performed by: NURSE PRACTITIONER

## 2024-06-11 PROCEDURE — 25810000003 SODIUM CHLORIDE 0.9 % SOLUTION 250 ML FLEX CONT: Performed by: INTERNAL MEDICINE

## 2024-06-11 RX ORDER — UREA 10 %
5 LOTION (ML) TOPICAL NIGHTLY
Status: DISCONTINUED | OUTPATIENT
Start: 2024-06-11 | End: 2024-06-16 | Stop reason: HOSPADM

## 2024-06-11 RX ADMIN — SODIUM CHLORIDE, POTASSIUM CHLORIDE, SODIUM LACTATE AND CALCIUM CHLORIDE 100 ML/HR: 600; 310; 30; 20 INJECTION, SOLUTION INTRAVENOUS at 12:08

## 2024-06-11 RX ADMIN — PANTOPRAZOLE SODIUM 40 MG: 40 TABLET, DELAYED RELEASE ORAL at 06:07

## 2024-06-11 RX ADMIN — AZITHROMYCIN MONOHYDRATE 500 MG: 500 INJECTION, POWDER, LYOPHILIZED, FOR SOLUTION INTRAVENOUS at 09:29

## 2024-06-11 RX ADMIN — APIXABAN 5 MG: 5 TABLET, FILM COATED ORAL at 08:00

## 2024-06-11 RX ADMIN — METOPROLOL TARTRATE 50 MG: 50 TABLET, FILM COATED ORAL at 08:00

## 2024-06-11 RX ADMIN — SENNOSIDES AND DOCUSATE SODIUM 2 TABLET: 50; 8.6 TABLET ORAL at 08:00

## 2024-06-11 RX ADMIN — Medication 1 APPLICATION: at 20:15

## 2024-06-11 RX ADMIN — SODIUM PHOSPHATE, MONOBASIC, MONOHYDRATE AND SODIUM PHOSPHATE, DIBASIC, ANHYDROUS 15 MMOL: 142; 276 INJECTION, SOLUTION INTRAVENOUS at 04:53

## 2024-06-11 RX ADMIN — Medication 1 APPLICATION: at 08:00

## 2024-06-11 RX ADMIN — METOPROLOL TARTRATE 50 MG: 50 TABLET, FILM COATED ORAL at 20:14

## 2024-06-11 RX ADMIN — SODIUM CHLORIDE, POTASSIUM CHLORIDE, SODIUM LACTATE AND CALCIUM CHLORIDE 100 ML/HR: 600; 310; 30; 20 INJECTION, SOLUTION INTRAVENOUS at 03:08

## 2024-06-11 RX ADMIN — CHLORHEXIDINE GLUCONATE 1 APPLICATION: 500 CLOTH TOPICAL at 04:53

## 2024-06-11 RX ADMIN — Medication 5 MG: at 23:18

## 2024-06-11 RX ADMIN — CEFTRIAXONE SODIUM 2000 MG: 2 INJECTION, POWDER, FOR SOLUTION INTRAMUSCULAR; INTRAVENOUS at 08:00

## 2024-06-11 RX ADMIN — ATORVASTATIN CALCIUM 40 MG: 40 TABLET ORAL at 20:15

## 2024-06-11 RX ADMIN — Medication 10 ML: at 08:01

## 2024-06-11 RX ADMIN — Medication 10 ML: at 20:15

## 2024-06-11 RX ADMIN — APIXABAN 5 MG: 5 TABLET, FILM COATED ORAL at 20:15

## 2024-06-11 RX ADMIN — SODIUM CHLORIDE, POTASSIUM CHLORIDE, SODIUM LACTATE AND CALCIUM CHLORIDE 100 ML/HR: 600; 310; 30; 20 INJECTION, SOLUTION INTRAVENOUS at 22:26

## 2024-06-11 RX ADMIN — Medication 5 MG/HR: at 21:18

## 2024-06-11 NOTE — PLAN OF CARE
Goal Outcome Evaluation:              Outcome Evaluation: A-fib rate 110s-120s. 4L nc. Phosphorus 1.9, replacement currently infusing. Continuous LR @ 100. Afebrile.                          Problem: Adult Inpatient Plan of Care  Goal: Plan of Care Review  Outcome: Ongoing, Progressing  Flowsheets (Taken 6/11/2024 0527)  Outcome Evaluation: A-fib rate 110s-120s. 4L nc. Phosphorus 1.9, replacement currently infusing. Continuous LR @ 100. Afebrile.  Goal: Patient-Specific Goal (Individualized)  Outcome: Ongoing, Progressing  Goal: Absence of Hospital-Acquired Illness or Injury  Outcome: Ongoing, Progressing  Intervention: Identify and Manage Fall Risk  Recent Flowsheet Documentation  Taken 6/11/2024 0500 by Blanco Haney RN  Safety Promotion/Fall Prevention: safety round/check completed  Taken 6/11/2024 0400 by Blanco Haney RN  Safety Promotion/Fall Prevention: safety round/check completed  Taken 6/11/2024 0300 by Blanco Haney RN  Safety Promotion/Fall Prevention: safety round/check completed  Taken 6/11/2024 0200 by Blanco Haney RN  Safety Promotion/Fall Prevention: safety round/check completed  Taken 6/11/2024 0100 by Blanco Haney RN  Safety Promotion/Fall Prevention: safety round/check completed  Taken 6/11/2024 0000 by Blanco Haney RN  Safety Promotion/Fall Prevention: safety round/check completed  Taken 6/10/2024 2200 by Blanco Haney RN  Safety Promotion/Fall Prevention: safety round/check completed  Taken 6/10/2024 2100 by Blanco Haney RN  Safety Promotion/Fall Prevention: safety round/check completed  Taken 6/10/2024 2000 by Blanco Haney RN  Safety Promotion/Fall Prevention: safety round/check completed  Intervention: Prevent Skin Injury  Recent Flowsheet Documentation  Taken 6/11/2024 0500 by Blanco Haney RN  Body Position: position changed independently  Taken 6/11/2024 0300 by Blanco Haney RN  Body Position: position changed independently  Taken 6/11/2024 0030 by Lyndon  LELA Simeon  Body Position: position changed independently  Taken 6/11/2024 0000 by Blanco Haney RN  Body Position: position changed independently  Taken 6/10/2024 2200 by Blanco Haney RN  Body Position: position changed independently  Taken 6/10/2024 2000 by lBanco Haney RN  Body Position: position changed independently  Intervention: Prevent and Manage VTE (Venous Thromboembolism) Risk  Recent Flowsheet Documentation  Taken 6/11/2024 0400 by Blanco Haney RN  Activity Management: bedrest  Taken 6/11/2024 0000 by Blanco Haney RN  Activity Management: bedrest  VTE Prevention/Management: (see MAR) other (see comments)  Taken 6/10/2024 2000 by Blanco Haney RN  Activity Management: bedrest  Goal: Optimal Comfort and Wellbeing  Outcome: Ongoing, Progressing  Intervention: Provide Person-Centered Care  Recent Flowsheet Documentation  Taken 6/11/2024 0000 by Blanco Haney RN  Trust Relationship/Rapport:   care explained   choices provided  Taken 6/10/2024 2000 by Blanco Haney RN  Trust Relationship/Rapport:   care explained   choices provided  Goal: Readiness for Transition of Care  Outcome: Ongoing, Progressing     Problem: Adjustment to Illness (Sepsis/Septic Shock)  Goal: Optimal Coping  Outcome: Ongoing, Progressing     Problem: Bleeding (Sepsis/Septic Shock)  Goal: Absence of Bleeding  Outcome: Ongoing, Progressing     Problem: Glycemic Control Impaired (Sepsis/Septic Shock)  Goal: Blood Glucose Level Within Desired Range  Outcome: Ongoing, Progressing     Problem: Infection Progression (Sepsis/Septic Shock)  Goal: Absence of Infection Signs and Symptoms  Outcome: Ongoing, Progressing  Intervention: Promote Recovery  Recent Flowsheet Documentation  Taken 6/11/2024 0400 by Blanco Haney RN  Activity Management: bedrest  Taken 6/11/2024 0000 by Blanco Haney RN  Activity Management: bedrest  Taken 6/10/2024 2000 by Blanco Haney RN  Activity Management: bedrest     Problem: Nutrition Impaired  (Sepsis/Septic Shock)  Goal: Optimal Nutrition Intake  Outcome: Ongoing, Progressing     Problem: Asthma Comorbidity  Goal: Maintenance of Asthma Control  Outcome: Ongoing, Progressing  Intervention: Maintain Asthma Symptom Control  Recent Flowsheet Documentation  Taken 6/11/2024 0500 by Blanco Haney RN  Medication Review/Management: medications reviewed  Taken 6/11/2024 0400 by Blanco Haney RN  Medication Review/Management: medications reviewed  Taken 6/11/2024 0300 by Blanco Haney RN  Medication Review/Management: medications reviewed  Taken 6/11/2024 0200 by Blanco Haney RN  Medication Review/Management: medications reviewed  Taken 6/11/2024 0100 by Blanco Haney RN  Medication Review/Management: medications reviewed  Taken 6/11/2024 0000 by Blanco Haney RN  Medication Review/Management: medications reviewed  Taken 6/10/2024 2200 by Blanco Haney RN  Medication Review/Management: medications reviewed  Taken 6/10/2024 2100 by Blanco Haney RN  Medication Review/Management: medications reviewed  Taken 6/10/2024 2000 by Blanco Haney RN  Medication Review/Management: medications reviewed     Problem: Behavioral Health Comorbidity  Goal: Maintenance of Behavioral Health Symptom Control  Outcome: Ongoing, Progressing  Intervention: Maintain Behavioral Health Symptom Control  Recent Flowsheet Documentation  Taken 6/11/2024 0500 by Blanco Haney RN  Medication Review/Management: medications reviewed  Taken 6/11/2024 0400 by Blanco Haney RN  Medication Review/Management: medications reviewed  Taken 6/11/2024 0300 by Blanco Haney RN  Medication Review/Management: medications reviewed  Taken 6/11/2024 0200 by Blanco Haney RN  Medication Review/Management: medications reviewed  Taken 6/11/2024 0100 by Blanco Haney RN  Medication Review/Management: medications reviewed  Taken 6/11/2024 0000 by Blanco Haney RN  Medication Review/Management: medications reviewed  Taken 6/10/2024 2200 by  Blanco Haney RN  Medication Review/Management: medications reviewed  Taken 6/10/2024 2100 by Blanco Haney RN  Medication Review/Management: medications reviewed  Taken 6/10/2024 2000 by Blanco Haney RN  Medication Review/Management: medications reviewed     Problem: COPD (Chronic Obstructive Pulmonary Disease) Comorbidity  Goal: Maintenance of COPD Symptom Control  Outcome: Ongoing, Progressing  Intervention: Maintain COPD-Symptom Control  Recent Flowsheet Documentation  Taken 6/11/2024 0500 by Blanco Haney RN  Medication Review/Management: medications reviewed  Taken 6/11/2024 0400 by Blanco Haney RN  Medication Review/Management: medications reviewed  Taken 6/11/2024 0300 by Blanco Haney RN  Medication Review/Management: medications reviewed  Taken 6/11/2024 0200 by Blanco Haney RN  Medication Review/Management: medications reviewed  Taken 6/11/2024 0100 by Blanco Haney RN  Medication Review/Management: medications reviewed  Taken 6/11/2024 0000 by Blanco Haney RN  Medication Review/Management: medications reviewed  Taken 6/10/2024 2200 by Blanco Haney RN  Medication Review/Management: medications reviewed  Taken 6/10/2024 2100 by Blanco Haney RN  Medication Review/Management: medications reviewed  Taken 6/10/2024 2000 by Blanco Haney RN  Medication Review/Management: medications reviewed     Problem: Diabetes Comorbidity  Goal: Blood Glucose Level Within Targeted Range  Outcome: Ongoing, Progressing     Problem: Heart Failure Comorbidity  Goal: Maintenance of Heart Failure Symptom Control  Outcome: Ongoing, Progressing  Intervention: Maintain Heart Failure-Management  Recent Flowsheet Documentation  Taken 6/11/2024 0500 by Blanco Haney RN  Medication Review/Management: medications reviewed  Taken 6/11/2024 0400 by Blanco Haney RN  Medication Review/Management: medications reviewed  Taken 6/11/2024 0300 by Blanco Haney RN  Medication Review/Management: medications  reviewed  Taken 6/11/2024 0200 by Blanco Haney RN  Medication Review/Management: medications reviewed  Taken 6/11/2024 0100 by Blanco Haney RN  Medication Review/Management: medications reviewed  Taken 6/11/2024 0000 by Blanco Haney RN  Medication Review/Management: medications reviewed  Taken 6/10/2024 2200 by Blanco Haney RN  Medication Review/Management: medications reviewed  Taken 6/10/2024 2100 by Blanco Haney RN  Medication Review/Management: medications reviewed  Taken 6/10/2024 2000 by Blanco Haney RN  Medication Review/Management: medications reviewed     Problem: Hypertension Comorbidity  Goal: Blood Pressure in Desired Range  Outcome: Ongoing, Progressing  Intervention: Maintain Blood Pressure Management  Recent Flowsheet Documentation  Taken 6/11/2024 0500 by Blanco Haney RN  Medication Review/Management: medications reviewed  Taken 6/11/2024 0400 by Blanco Haney RN  Medication Review/Management: medications reviewed  Taken 6/11/2024 0300 by Blanco Haney RN  Medication Review/Management: medications reviewed  Taken 6/11/2024 0200 by Blanco Haney RN  Medication Review/Management: medications reviewed  Taken 6/11/2024 0100 by Blanco Haney RN  Medication Review/Management: medications reviewed  Taken 6/11/2024 0000 by Blanco Haney RN  Medication Review/Management: medications reviewed  Taken 6/10/2024 2200 by Blanco Haney RN  Medication Review/Management: medications reviewed  Taken 6/10/2024 2100 by Blanco Haney RN  Medication Review/Management: medications reviewed  Taken 6/10/2024 2000 by Blanco Haney RN  Medication Review/Management: medications reviewed     Problem: Obstructive Sleep Apnea Risk or Actual Comorbidity Management  Goal: Unobstructed Breathing During Sleep  Outcome: Ongoing, Progressing     Problem: Osteoarthritis Comorbidity  Goal: Maintenance of Osteoarthritis Symptom Control  Outcome: Ongoing, Progressing  Intervention: Maintain  Osteoarthritis Symptom Control  Recent Flowsheet Documentation  Taken 6/11/2024 0500 by Blanco Haney RN  Medication Review/Management: medications reviewed  Taken 6/11/2024 0400 by Blanco Haney RN  Activity Management: bedrest  Medication Review/Management: medications reviewed  Taken 6/11/2024 0300 by Blanco Haney RN  Medication Review/Management: medications reviewed  Taken 6/11/2024 0200 by Blanco Haney RN  Medication Review/Management: medications reviewed  Taken 6/11/2024 0100 by Blanco Haney RN  Medication Review/Management: medications reviewed  Taken 6/11/2024 0000 by Blanco Haney RN  Activity Management: bedrest  Medication Review/Management: medications reviewed  Taken 6/10/2024 2200 by Blanco Haney RN  Medication Review/Management: medications reviewed  Taken 6/10/2024 2100 by Blanco Haney RN  Medication Review/Management: medications reviewed  Taken 6/10/2024 2000 by Blanco Haney RN  Activity Management: bedrest  Medication Review/Management: medications reviewed     Problem: Pain Chronic (Persistent) (Comorbidity Management)  Goal: Acceptable Pain Control and Functional Ability  Outcome: Ongoing, Progressing  Intervention: Manage Persistent Pain  Recent Flowsheet Documentation  Taken 6/11/2024 0500 by Blanco Haney RN  Medication Review/Management: medications reviewed  Taken 6/11/2024 0400 by Blanco Haney RN  Medication Review/Management: medications reviewed  Taken 6/11/2024 0300 by Blanco Haney RN  Medication Review/Management: medications reviewed  Taken 6/11/2024 0200 by Blanco Haney RN  Medication Review/Management: medications reviewed  Taken 6/11/2024 0100 by Blanco Haney RN  Medication Review/Management: medications reviewed  Taken 6/11/2024 0000 by Blanco Haney RN  Medication Review/Management: medications reviewed  Taken 6/10/2024 2200 by Blanco Haney RN  Medication Review/Management: medications reviewed  Taken 6/10/2024 2100 by Blanco Haney  RN  Medication Review/Management: medications reviewed  Taken 6/10/2024 2000 by Blanco Haney RN  Medication Review/Management: medications reviewed     Problem: Seizure Disorder Comorbidity  Goal: Maintenance of Seizure Control  Outcome: Ongoing, Progressing     Problem: Fall Injury Risk  Goal: Absence of Fall and Fall-Related Injury  Outcome: Ongoing, Progressing  Intervention: Identify and Manage Contributors  Recent Flowsheet Documentation  Taken 6/11/2024 0500 by Blanco Haney RN  Medication Review/Management: medications reviewed  Taken 6/11/2024 0400 by Blanco Haney RN  Medication Review/Management: medications reviewed  Taken 6/11/2024 0300 by Blanco Haney RN  Medication Review/Management: medications reviewed  Taken 6/11/2024 0200 by Blanco Haney RN  Medication Review/Management: medications reviewed  Taken 6/11/2024 0100 by Blanco Haney RN  Medication Review/Management: medications reviewed  Taken 6/11/2024 0000 by Blanco Haney RN  Medication Review/Management: medications reviewed  Taken 6/10/2024 2200 by Blanco Haney RN  Medication Review/Management: medications reviewed  Taken 6/10/2024 2100 by Blanco Haney RN  Medication Review/Management: medications reviewed  Taken 6/10/2024 2000 by Blanco Haney RN  Medication Review/Management: medications reviewed  Intervention: Promote Injury-Free Environment  Recent Flowsheet Documentation  Taken 6/11/2024 0500 by Blanco Haney RN  Safety Promotion/Fall Prevention: safety round/check completed  Taken 6/11/2024 0400 by Blanco Haney RN  Safety Promotion/Fall Prevention: safety round/check completed  Taken 6/11/2024 0300 by Blanco Haney RN  Safety Promotion/Fall Prevention: safety round/check completed  Taken 6/11/2024 0200 by Blanco Haney RN  Safety Promotion/Fall Prevention: safety round/check completed  Taken 6/11/2024 0100 by Blanco Haney RN  Safety Promotion/Fall Prevention: safety round/check completed  Taken 6/11/2024  0000 by Lyndon, Blanco, RN  Safety Promotion/Fall Prevention: safety round/check completed  Taken 6/10/2024 2200 by Blanco Haney RN  Safety Promotion/Fall Prevention: safety round/check completed  Taken 6/10/2024 2100 by Blanco Haney RN  Safety Promotion/Fall Prevention: safety round/check completed  Taken 6/10/2024 2000 by Blanco Haney RN  Safety Promotion/Fall Prevention: safety round/check completed

## 2024-06-11 NOTE — NURSING NOTE
[facility]  INPATIENT WOUND & OSTOMY CARE    Today's Date: 06/11/24    Patient Name: Rome Justice  MRN: [id number]  CSN: [id number]  PCP: Provider, No Known  Attending Provider: Hiwot Abraham MD  Length of Stay: 1    Pressure injury prevention measure orders per protocol due to patient being at risk for skin breakdown and being admitted to the unit.     Silicone foam border dressing ordered to be applied to sacral spine for protection.  Nursing to change dressing every 3 days and PRN if soiled. Nursing is to peel back dressing with every assessment to assess skin underneath dressing.      Patient is currently on a comfort glide with an absorbant pad. Wedges are at bedside for nursing to utilizing for turning.     Please reach out to wound care nurse if any skin issues arise.     This document has been electronically signed by Gilma Crockett RN on 6/11/2024 10:02 CDT

## 2024-06-11 NOTE — PLAN OF CARE
Goal Outcome Evaluation:  Plan of Care Reviewed With: patient        Progress: improving  Outcome Evaluation: A/O x4. VSS. Afib 110s-120s. Phos recheck 2.8. 2LNC. Uses IS independently. Up to [facility] with standby assist. 2 BMs today. No complaints of pain. Safety maintained.         Problem: Adult Inpatient Plan of Care  Goal: Plan of Care Review  Outcome: Ongoing, Progressing  Flowsheets (Taken 6/11/2024 1749)  Progress: improving  Plan of Care Reviewed With: patient  Outcome Evaluation: A/O x4. VSS. Afib 110s-120s. Phos recheck 2.8. 2LNC. Uses IS independently. Up to [facility] with standby assist. 2 BMs today. No complaints of pain. Safety maintained.  Goal: Patient-Specific Goal (Individualized)  Outcome: Ongoing, Progressing  Goal: Absence of Hospital-Acquired Illness or Injury  Outcome: Ongoing, Progressing  Intervention: Identify and Manage Fall Risk  Recent Flowsheet Documentation  Taken 6/11/2024 1700 by Richa Das RN  Safety Promotion/Fall Prevention: safety round/check completed  Taken 6/11/2024 1600 by Richa Das RN  Safety Promotion/Fall Prevention: safety round/check completed  Taken 6/11/2024 1500 by Richa Das RN  Safety Promotion/Fall Prevention: safety round/check completed  Taken 6/11/2024 1400 by Richa Das RN  Safety Promotion/Fall Prevention: safety round/check completed  Taken 6/11/2024 1300 by Richa Das RN  Safety Promotion/Fall Prevention: safety round/check completed  Taken 6/11/2024 1200 by Richa Das RN  Safety Promotion/Fall Prevention: safety round/check completed  Taken 6/11/2024 1100 by Richa Das RN  Safety Promotion/Fall Prevention: safety round/check completed  Taken 6/11/2024 1000 by Richa Das RN  Safety Promotion/Fall Prevention: safety round/check completed  Taken 6/11/2024 0900 by Richa Das RN  Safety Promotion/Fall Prevention: safety round/check completed  Taken 6/11/2024 0800 by Richa Das RN  Safety Promotion/Fall  Prevention: safety round/check completed  Taken 6/11/2024 0700 by Richa Das RN  Safety Promotion/Fall Prevention: safety round/check completed  Intervention: Prevent Skin Injury  Recent Flowsheet Documentation  Taken 6/11/2024 1700 by Richa Das RN  Body Position: position changed independently  Taken 6/11/2024 1500 by Richa Das RN  Body Position: position changed independently  Taken 6/11/2024 1300 by Richa Das RN  Body Position: position changed independently  Taken 6/11/2024 1100 by Richa Das RN  Body Position: position changed independently  Taken 6/11/2024 0900 by Richa Das RN  Body Position: position changed independently  Taken 6/11/2024 0800 by Richa Das RN  Skin Protection:   adhesive use limited   incontinence pads utilized   silicone foam dressing in place  Taken 6/11/2024 0700 by Richa Das RN  Body Position: position changed independently  Intervention: Prevent and Manage VTE (Venous Thromboembolism) Risk  Recent Flowsheet Documentation  Taken 6/11/2024 1700 by Richa Das RN  Activity Management: bedrest  Taken 6/11/2024 1500 by Richa Das RN  Activity Management: bedrest  Taken 6/11/2024 1300 by iRcha Dsa RN  Activity Management: bedrest  Taken 6/11/2024 1100 by Richa Das RN  Activity Management: bedrest  Taken 6/11/2024 0900 by Richa Das RN  Activity Management: up to bedside commode  Taken 6/11/2024 0800 by Richa Das RN  VTE Prevention/Management: (see mar) other (see comments)  Taken 6/11/2024 0700 by Richa Das RN  Activity Management: bedrest  Goal: Optimal Comfort and Wellbeing  Outcome: Ongoing, Progressing  Intervention: Provide Person-Centered Care  Recent Flowsheet Documentation  Taken 6/11/2024 1600 by Richa Das RN  Trust Relationship/Rapport: care explained  Taken 6/11/2024 1200 by Richa Das RN  Trust Relationship/Rapport: care explained  Taken 6/11/2024 0800 by Richa Das  RN  Trust Relationship/Rapport: care explained  Goal: Readiness for Transition of Care  Outcome: Ongoing, Progressing

## 2024-06-11 NOTE — PROGRESS NOTES
[facility] Intensivist Services  Progress Note    Date of Admission: 6/9/2024  Primary Care Physician: Provider, No Known    Subjective     Chief Complaint: Sepsis     History of Present Illness  Rome Justice is a 73 y.o. male who presented to the emergency department today with rigors, nausea, vomiting and back pain.  CT of the abdomen and pelvis was concerning for left lower lobe pneumonia.  Incidental finding on the CT of the abdomen and pelvis revealed bilateral fullness of the ureter collecting system.  Patient met sepsis criteria by evidence of leukocytosis, bandemia, elevated lactate, tachycardia and hypotension.  He was IV fluid resuscitated appropriately for which his blood pressure responded.  He was covered empirically with broad-spectrum antibiotics Zosyn and vancomycin.     During his ED course his heart rate increased.  Longstanding history of A-fib with a recent hospitalization at the end of January for rate control.  Rate greater than 170 on initial EKG.  After adequate IV fluid resuscitation he was placed on diltiazem drip after diltiazem bolus was ineffective.     The patient does not appear to be in any acute distress.  His O2 saturations were marginal therefore he was placed on 2 L of nasal cannula to maintain sats greater than 90%.  No oxygen requirements at baseline.     I had requested a CT of the chest to be added on to his ER workup as her was not a chest x-ray for viewing to confirm pneumonia other than the incidental finding noted on the CT of the abdomen and pelvis.  He clearly appear septic source felt to be pneumonia.     I independently viewed the CT of the chest films and there is a left lobar consolidation concerning for parapneumonic effusion.     Ideally, CTA of the chest to rule out any PE would have been my first choice however the patient already had a CT with contrast of the abdomen and pelvis prior to therefore we proceeded with a CT of  the chest without IV contrast.  The patient is anticoagulant Eliquis therefore I feel a PE is less likely.           Review of Systems   Otherwise complete ROS reviewed and negative except as mentioned in the HPI.    Past Medical History:   Past Medical History:   Diagnosis Date    Atrial fibrillation     Hypertension      Past Surgical History:  Past Surgical History:   Procedure Laterality Date    HERNIA REPAIR       Social History:  reports that he has never smoked. He has never used smokeless tobacco. He reports that he does not drink alcohol and does not use drugs.    Family History: family history is not on file.     Allergies:  No Known Allergies    Medications:  Prior to Admission medications    Medication Sig Start Date End Date Taking? Authorizing Provider   apixaban (Eliquis) 5 MG tablet tablet Take 1 tablet by mouth Every 12 (Twelve) Hours.   Yes Wendi Youssef MD   ascorbic acid (VITAMIN C) 500 MG tablet Take 1 tablet by mouth Daily.   Yes Wendi Youssef MD   atorvastatin (LIPITOR) 40 MG tablet Take 1 tablet by mouth Every Night.   Yes Wendi Youssef MD   Cholecalciferol 25 MCG (1000 UT) tablet Take 1 tablet by mouth Daily.   Yes Wendi Youssef MD   coenzyme Q10 100 MG capsule Take 1 capsule by mouth Daily.   Yes Wendi Youssef MD   flecainide (TAMBOCOR) 100 MG tablet Take 1 tablet by mouth 2 (Two) Times a Day.   Yes Wendi Youssef MD   glucosamine-chondroitin 500-400 MG capsule capsule Take 1 capsule by mouth 2 (Two) Times a Day With Meals.   Yes Wendi Youssef MD   lisinopril (PRINIVIL,ZESTRIL) 10 MG tablet Take 1 tablet by mouth Daily.   Yes Wendi Youssef MD   metoprolol tartrate (LOPRESSOR) 25 MG tablet Take 2 tablets by mouth 2 (Two) Times a Day.   Yes Wendi Youssef MD   Misc Natural Products (URINOZINC PROSTATE CLASSIC PO) Take 1 tablet by mouth Daily.   Yes Wendi Youssef MD     I have utilized all available immediate  "resources to obtain, update, or review the patient's current medications (including all prescriptions, over-the-counter products, herbals, cannabis/cannabidiol products, and vitamin/mineral/dietary (nutritional) supplements).    Objective     Vital Signs: BP 99/78   Pulse 116   Temp 97.6 °F (36.4 °C) (Oral)   Resp 24   Ht 180.3 cm (71\")   Wt 99.8 kg (220 lb 0.3 oz)   SpO2 96%   BMI 30.69 kg/m²   Physical Exam   General :patient is comfortable not in distress    Head exam: Atraumatic ,normocephalic    Neck exam: No rigidity    Cardiac exam : normal cardiac sounds, no murmurs, no added sounds, normal rate and rhythm    Chest exam : Normal breath sounds, no crepitation, no rhonchi, normal work of breathing    Abdominal exam: Soft abdomen, no organomegaly, no tenderness    Skin exam: No rashes, no petechiae    Extremities: Normal range of movement, no edema    Neurological exam: Alert and oriented x 3, normal cranial nerves, no focal neurological deficit,       Results Reviewed:    Lab Results (last 24 hours)       Procedure Component Value Units Date/Time    Phosphorus [[id number]]  (Normal) Collected: 06/11/24 1427    Specimen: Blood Updated: 06/11/24 1510     Phosphorus 2.8 mg/dL     Respiratory Culture - Sputum, Cough [[id number]] Collected: 06/10/24 0449    Specimen: Sputum from Cough Updated: 06/11/24 1047     Respiratory Culture Moderate growth (3+) The culture consists of normal respiratory jailyn. This is a preliminary report; final report to follow.     Gram Stain Many (4+) WBCs per low power field      Rare (1+) Epithelial cells per low power field      Moderate (3+) Mixed gram positive jailyn      Moderate (3+) Mixed gram negative jailyn      Mixed intracellular organisms suggestive of an aspiration event    Phosphorus [[id number]]  (Abnormal) Collected: 06/11/24 0321    Specimen: Blood Updated: 06/11/24 0401     Phosphorus 1.9 mg/dL     Magnesium [[id number]]  (Normal) Collected: 06/11/24 0321    Specimen: " Blood Updated: 06/11/24 0352     Magnesium 2.2 mg/dL     Basic Metabolic Panel [[id number]]  (Abnormal) Collected: 06/11/24 0321    Specimen: Blood Updated: 06/11/24 0346     Glucose 112 mg/dL      BUN 37 mg/dL      Creatinine 1.16 mg/dL      Sodium 135 mmol/L      Potassium 4.3 mmol/L      Chloride 103 mmol/L      CO2 22.0 mmol/L      Calcium 8.0 mg/dL      BUN/Creatinine Ratio 31.9     Anion Gap 10.0 mmol/L      eGFR 67.3 mL/min/1.73     Narrative:      GFR Normal >60  Chronic Kidney Disease <60  Kidney Failure <15    The GFR formula is only valid for adults with stable renal function between ages 18 and 70.    CBC & Differential [[id number]]  (Abnormal) Collected: 06/11/24 0321    Specimen: Blood Updated: 06/11/24 0328    Narrative:      The following orders were created for panel order CBC & Differential.  Procedure                               Abnormality         Status                     ---------                               -----------         ------                     CBC Auto Differential[[id number]]        Abnormal            Final result                 Please view results for these tests on the individual orders.    CBC Auto Differential [[id number]]  (Abnormal) Collected: 06/11/24 0321    Specimen: Blood Updated: 06/11/24 0328     WBC 17.99 10*3/mm3      RBC 4.04 10*6/mm3      Hemoglobin 12.2 g/dL      Hematocrit 36.9 %      MCV 91.3 fL      MCH 30.2 pg      MCHC 33.1 g/dL      RDW 13.9 %      RDW-SD 47.2 fl      MPV 10.8 fL      Platelets 211 10*3/mm3      Neutrophil % 90.0 %      Lymphocyte % 3.3 %      Monocyte % 5.8 %      Eosinophil % 0.2 %      Basophil % 0.2 %      Immature Grans % 0.5 %      Neutrophils, Absolute 16.19 10*3/mm3      Lymphocytes, Absolute 0.59 10*3/mm3      Monocytes, Absolute 1.05 10*3/mm3      Eosinophils, Absolute 0.03 10*3/mm3      Basophils, Absolute 0.04 10*3/mm3      Immature Grans, Absolute 0.09 10*3/mm3      nRBC 0.0 /100 WBC     Blood Culture - Blood, Hand, Left  [[id number]]  (Normal) Collected: 06/10/24 0114    Specimen: Blood from Hand, Left Updated: 06/11/24 0201     Blood Culture No growth at 24 hours    Blood Culture - Blood, Arm, Right [[id number]]  (Normal) Collected: 06/10/24 0122    Specimen: Blood from Arm, Right Updated: 06/11/24 0201     Blood Culture No growth at 24 hours    Urinalysis With Culture If Indicated - Urine, Clean Catch [[id number]]  (Abnormal) Collected: 06/10/24 2102    Specimen: Urine, Clean Catch Updated: 06/10/24 2147     Color, UA Solano     Appearance, UA Clear     pH, UA 5.5     Specific Gravity, UA >1.030     Glucose, UA Negative     Ketones, UA Negative     Bilirubin, UA Negative     Blood, UA Trace     Protein,  mg/dL (2+)     Leuk Esterase, UA Negative     Nitrite, UA Negative     Urobilinogen, UA 1.0 E.U./dL    Narrative:      Dipstick results may be inaccurate due to color interference.    Urinalysis, Microscopic Only - Urine, Clean Catch [[id number]]  (Abnormal) Collected: 06/10/24 2102    Specimen: Urine, Clean Catch Updated: 06/10/24 2147     RBC, UA None Seen /HPF      WBC, UA None Seen /HPF      Comment: Urine culture not indicated.        Bacteria, UA Trace /HPF      Squamous Epithelial Cells, UA 0-2 /HPF      Hyaline Casts, UA 0-2 /LPF      Granular Casts, UA 0-2 /LPF      Methodology Manual Light Microscopy            XR Chest 1 View    Result Date: 6/11/2024  EXAMINATION: XR CHEST 1 VW- 6/11/2024 10:31 AM  HISTORY: pna; I48.91-Unspecified atrial fibrillation; A41.9-Sepsis, unspecified organism; N17.9-Acute kidney failure, unspecified; J18.9-Pneumonia, unspecified organism; R09.02-Hypoxemia.  REPORT: A frontal view of the chest was obtained.  COMPARISON: CT chest without contrast Ramya 10, 2024.  There is extensive consolidation of the left lung, greatest at the lung base with obscuration of the left mid diaphragm. There are air bronchograms in the perihilar region. There is mild atelectasis in the right lung base. Left heart  margin is mostly obscured, there is no obvious cardiomegaly or evidence of CHF. No pneumothorax is identified. Underlying left pleural effusion cannot be excluded, though none is seen on the CT.. The osseous structures are unremarkable.      Impression: Extensive consolidating left-sided pneumonia.    This report was signed and finalized on 6/11/2024 10:33 AM by Dr. Scott Mitchell MD.      CT Abdomen Pelvis With Contrast    Result Date: 6/10/2024  EXAMINATION: CT ABDOMEN PELVIS W CONTRAST-   6/9/2024 10:15 PM  HISTORY: LLQ, left flank, NV; I48.91-Unspecified atrial fibrillation  In order to have a CT radiation dose as low as reasonably achievable Automated Exposure Control was utilized for adjustment of the mA and/or KV according to patient size.  Total DLP = 416.95 mGy.cm  The CT scan of the abdomen and pelvis is performed after intravenous contrast enhancement.  Images are acquired in the axial plane and subsequent reconstruction in coronal and sagittal planes.  There is no previous similar study for comparison.  The chest is separately reviewed and reported.  There is fatty infiltration of the liver. No focal intrinsic abnormality. The spleen is normal with multiple small granulomas.  No radiopaque gallstones.  Pancreas is normal.  The adrenal glands bilaterally are normal.  There is moderate lobulation of renal contour bilaterally. There is mild irregularity and focal bulge along the mid right lateral kidney which may represent a hypertrophied column of Solo or asymmetrical cortical hypertrophy?. It measures approximately 1.8 cm in diameter. No radiopaque calculi. There is mild ectasia of the collecting system bilaterally involving the proximal ureters on both sides. There is surrounding retroperitoneal fat infiltration. No calculi in either ureter. Urinary bladder is poorly distended. No intrinsic abnormality.  Prostate is not enlarged. There is intrinsic calcification. There are small metallic objects in  the prostate with streak artifact. This may represent radiotherapy seeds.  There is a small fat-containing umbilical hernia.  There is small hiatal hernia. Stomach and duodenum are unremarkable. Small bowel is nondistended. Appendix is not visualized. No finding to suggest appendicitis. Moderate gas and stool is seen in the colon. There is diverticulosis of the colon. No evidence of diverticulitis.  Atheromatous change of the abdominal aorta and iliac arteries. No aneurysmal dilatation.  There is no evidence of abdominal or pelvic lymphadenopathy.  Images reviewed in bone window show chronic degenerative changes of the lumbar spine with mild dextroscoliosis. Small focus of bony sclerosis involving the right iliac bone adjacent to the right sacroiliac joint and similar finding in the adjacent sacrum may represent bone islands?. Possibility of metastatic disease is not excluded.      Impression: 1. A mild symmetrical ectasia of the renal collecting system bilaterally may represent a normal variation, chronic pyelonephritis or vesicoureteral reflux disease?. No calculi. No finding to suggest obstruction. This may be clinically correlated and further evaluated. 2. An apparent irregularity of the right renal parenchyma/cortex in the midpole may represent a pseudotumor?. However possibility of a neoplastic process may not be excluded. A follow-up contrast enhanced CT or MR imaging of the abdomen with renal mass protocol may be obtained. 3. Other nonacute findings as above. 4. The chest is separately reviewed and reported.  The above study was initially reviewed and reported by [facility]. The above-mentioned discrepancy was reported to ER physician at 8:13 a.m.            This report was signed and finalized on 6/10/2024 8:13 AM by Dr. Muriel Dixon MD.      CT Chest Without Contrast Diagnostic    Result Date: 6/10/2024  EXAMINATION: CT CHEST WO CONTRAST DIAGNOSTIC-   6/10/2024 1:15 AM  HISTORY: left pna;  I48.91-Unspecified atrial fibrillation; A41.9-Sepsis, unspecified organism; N17.9-Acute kidney failure, unspecified; J18.9-Pneumonia, unspecified organism; R09.02-Hypoxemia  In order to have a CT radiation dose as low as reasonably achievable Automated Exposure Control was utilized for adjustment of the mA and/or KV according to patient size.  Total DLP = 221.38 mGy.cm  The CT scan of the chest is performed without intravenous contrast enhancement.  Images are acquired in the axial plane and subsequent reconstruction in coronal and sagittal planes.  There is no previous similar study for comparison.  There are areas of consolidation with air bronchograms involving the left upper lobe posteriorly and the left lower lobe.  A smaller infiltrate is seen in the right parahilar area and right lower lobe.  There is a small bibasilar pleural effusion.  There are atelectatic changes in the right middle lobe.  The Central airway is patent. No endobronchial abnormality is noted.  There are a few calcified granulomas in the lungs bilaterally.  Limited visualized soft tissue of the neck are unremarkable.  The thyroid gland is suboptimally visualized and not well evaluated. There is a probable nodule in the left lobe.  There is no axillary lymphadenopathy.  Atheromatous change of thoracic aorta noted. No aneurysmal dilatation.  Moderate ectasia of the central pulmonary arteries represent pulmonary arterial hypertension.  There are a few borderline prominent mediastinal lymph nodes. The largest lymph node in the left hilum, level 5, measures 9 mm in short axis. There are calcified granulomas in the mediastinum and right hilum. The hilar lymph nodes are not well visualized or evaluated in this study due to lack of contrast enhancement.  Atheromatous change of coronary arteries are noted.  There is moderate cardiomegaly.  There is a small hiatal hernia.  The limited visualized unenhanced abdomen is unremarkable and there is a  residual contrast in the renal collecting system bilaterally from a previous contrast enhanced CT scan of the abdomen and pelvis.  Images reviewed in bone windows show chronic degenerative changes of the thoracic spine. No acute bony abnormality.      Impression: 1. Bilateral lung infiltrate, significantly more extensive on the left, represent an acute inflammatory/infectious process. 2. Small bibasilar pleural effusion. 3. Nonspecific borderline mediastinal lymphadenopathy probably represent reactive adenopathy to the infiltrate.  The above study was initially reviewed and reported by [facility]. I do not find any discrepancies.                   This report was signed and finalized on 6/10/2024 7:20 AM by Dr. Muriel Dixon MD.       Assessment / Plan   Assessment and Plan    Active Hospital Problems    Diagnosis     **Sepsis     Pneumonia involving left lung     Chronic atrial fibrillation with rapid ventricular response     Abnormal CT of the abdomen     Acute respiratory failure with hypoxia    Acute hypoxic respiratory failure secondary to left lower lobe lobar pneumonia patient was put on broad-spectrum antibiotics the pneumonia panel was asked for he is closely monitored in the ICU    Atrial fibrillation possibly precipitated by the pneumonia patient is on diltiazem drip for rate control closely monitored in the ICU    White cell count still elevated but responding to treatmentI and personal management. The critical care time does not include time spent on separa.I provided 40 minutes of total critical care time. Due to the high probability of clinically significant, life-threatening deterioration, the patient required my direct and personal management. The critical care time does not include time spent on separately billable procedures.    Anticoagulation for A-fib and DVT prophylaxis    Code Status:    .     Electronically signed by Hiwot Abraham MD on 6/11/2024 at 18:27 CDT

## 2024-06-12 ENCOUNTER — APPOINTMENT (OUTPATIENT)
Dept: CARDIOLOGY | Facility: HOSPITAL | Age: 74
DRG: 871 | End: 2024-06-12
Payer: MEDICARE

## 2024-06-12 LAB
ANION GAP SERPL CALCULATED.3IONS-SCNC: 9 MMOL/L (ref 5–15)
BACTERIA SPEC RESP CULT: NORMAL
BASOPHILS # BLD AUTO: 0.04 10*3/MM3 (ref 0–0.2)
BASOPHILS NFR BLD AUTO: 0.3 % (ref 0–1.5)
BH CV ECHO MEAS - AO MAX PG: 10.1 MMHG
BH CV ECHO MEAS - AO MEAN PG: 6 MMHG
BH CV ECHO MEAS - AO V2 MAX: 159 CM/SEC
BH CV ECHO MEAS - AO V2 VTI: 32.3 CM
BH CV ECHO MEAS - AVA(I,D): 2.7 CM2
BH CV ECHO MEAS - EDV(CUBED): 114.8 ML
BH CV ECHO MEAS - EDV(MOD-SP2): 83.8 ML
BH CV ECHO MEAS - EDV(MOD-SP4): 80.4 ML
BH CV ECHO MEAS - EF(MOD-BP): 71.4 %
BH CV ECHO MEAS - EF(MOD-SP2): 62.2 %
BH CV ECHO MEAS - EF(MOD-SP4): 77.5 %
BH CV ECHO MEAS - ESV(CUBED): 20.3 ML
BH CV ECHO MEAS - ESV(MOD-SP2): 31.7 ML
BH CV ECHO MEAS - ESV(MOD-SP4): 18.1 ML
BH CV ECHO MEAS - FS: 43.8 %
BH CV ECHO MEAS - IVS/LVPW: 0.89 CM
BH CV ECHO MEAS - IVSD: 0.98 CM
BH CV ECHO MEAS - LA DIMENSION: 3.5 CM
BH CV ECHO MEAS - LAT PEAK E' VEL: 16.2 CM/SEC
BH CV ECHO MEAS - LV DIASTOLIC VOL/BSA (35-75): 36.6 CM2
BH CV ECHO MEAS - LV MASS(C)D: 182.6 GRAMS
BH CV ECHO MEAS - LV MAX PG: 6.2 MMHG
BH CV ECHO MEAS - LV MEAN PG: 3 MMHG
BH CV ECHO MEAS - LV SYSTOLIC VOL/BSA (12-30): 8.2 CM2
BH CV ECHO MEAS - LV V1 MAX: 124 CM/SEC
BH CV ECHO MEAS - LV V1 VTI: 23.1 CM
BH CV ECHO MEAS - LVIDD: 4.9 CM
BH CV ECHO MEAS - LVIDS: 2.7 CM
BH CV ECHO MEAS - LVOT AREA: 3.8 CM2
BH CV ECHO MEAS - LVOT DIAM: 2.2 CM
BH CV ECHO MEAS - LVPWD: 1.1 CM
BH CV ECHO MEAS - MED PEAK E' VEL: 10.1 CM/SEC
BH CV ECHO MEAS - MR MAX PG: 38.2 MMHG
BH CV ECHO MEAS - MR MAX VEL: 309 CM/SEC
BH CV ECHO MEAS - MV DEC SLOPE: 447 CM/SEC2
BH CV ECHO MEAS - MV P1/2T: 74 MSEC
BH CV ECHO MEAS - MVA(P1/2T): 3 CM2
BH CV ECHO MEAS - PA V2 MAX: 71.9 CM/SEC
BH CV ECHO MEAS - RAP SYSTOLE: 3 MMHG
BH CV ECHO MEAS - RV MAX PG: 1.61 MMHG
BH CV ECHO MEAS - RV V1 MAX: 63.4 CM/SEC
BH CV ECHO MEAS - RVDD: 3.1 CM
BH CV ECHO MEAS - RVSP: 40.9 MMHG
BH CV ECHO MEAS - SV(LVOT): 87.8 ML
BH CV ECHO MEAS - SV(MOD-SP2): 52.1 ML
BH CV ECHO MEAS - SV(MOD-SP4): 62.3 ML
BH CV ECHO MEAS - SVI(LVOT): 40 ML/M2
BH CV ECHO MEAS - SVI(MOD-SP2): 23.7 ML/M2
BH CV ECHO MEAS - SVI(MOD-SP4): 28.4 ML/M2
BH CV ECHO MEAS - TAPSE (>1.6): 2.33 CM
BH CV ECHO MEAS - TR MAX PG: 37.9 MMHG
BH CV ECHO MEAS - TR MAX VEL: 308 CM/SEC
BH CV XLRA - RV BASE: 3.2 CM
BUN SERPL-MCNC: 29 MG/DL (ref 8–23)
BUN/CREAT SERPL: 28.4 (ref 7–25)
CALCIUM SPEC-SCNC: 8 MG/DL (ref 8.6–10.5)
CHLORIDE SERPL-SCNC: 104 MMOL/L (ref 98–107)
CO2 SERPL-SCNC: 24 MMOL/L (ref 22–29)
CREAT SERPL-MCNC: 1.02 MG/DL (ref 0.76–1.27)
DEPRECATED RDW RBC AUTO: 47.7 FL (ref 37–54)
EGFRCR SERPLBLD CKD-EPI 2021: 78.6 ML/MIN/1.73
EOSINOPHIL # BLD AUTO: 0.12 10*3/MM3 (ref 0–0.4)
EOSINOPHIL NFR BLD AUTO: 0.8 % (ref 0.3–6.2)
ERYTHROCYTE [DISTWIDTH] IN BLOOD BY AUTOMATED COUNT: 13.9 % (ref 12.3–15.4)
GLUCOSE SERPL-MCNC: 114 MG/DL (ref 65–99)
GRAM STN SPEC: NORMAL
HCT VFR BLD AUTO: 36.1 % (ref 37.5–51)
HGB BLD-MCNC: 11.8 G/DL (ref 13–17.7)
IMM GRANULOCYTES # BLD AUTO: 0.13 10*3/MM3 (ref 0–0.05)
IMM GRANULOCYTES NFR BLD AUTO: 0.8 % (ref 0–0.5)
LEFT ATRIUM VOLUME INDEX: 27.7 ML/M2
LEFT ATRIUM VOLUME: 60.9 ML
LYMPHOCYTES # BLD AUTO: 0.74 10*3/MM3 (ref 0.7–3.1)
LYMPHOCYTES NFR BLD AUTO: 4.8 % (ref 19.6–45.3)
MAGNESIUM SERPL-MCNC: 1.8 MG/DL (ref 1.6–2.4)
MCH RBC QN AUTO: 30.2 PG (ref 26.6–33)
MCHC RBC AUTO-ENTMCNC: 32.7 G/DL (ref 31.5–35.7)
MCV RBC AUTO: 92.3 FL (ref 79–97)
MONOCYTES # BLD AUTO: 0.99 10*3/MM3 (ref 0.1–0.9)
MONOCYTES NFR BLD AUTO: 6.4 % (ref 5–12)
NEUTROPHILS NFR BLD AUTO: 13.45 10*3/MM3 (ref 1.7–7)
NEUTROPHILS NFR BLD AUTO: 86.9 % (ref 42.7–76)
NRBC BLD AUTO-RTO: 0 /100 WBC (ref 0–0.2)
PLATELET # BLD AUTO: 246 10*3/MM3 (ref 140–450)
PMV BLD AUTO: 10.9 FL (ref 6–12)
POTASSIUM SERPL-SCNC: 4.2 MMOL/L (ref 3.5–5.2)
RBC # BLD AUTO: 3.91 10*6/MM3 (ref 4.14–5.8)
SINUS: 3.1 CM
SODIUM SERPL-SCNC: 137 MMOL/L (ref 136–145)
STJ: 3.1 CM
WBC NRBC COR # BLD AUTO: 15.47 10*3/MM3 (ref 3.4–10.8)

## 2024-06-12 PROCEDURE — 25510000001 PERFLUTREN 6.52 MG/ML SUSPENSION: Performed by: INTERNAL MEDICINE

## 2024-06-12 PROCEDURE — 25010000002 CEFTRIAXONE PER 250 MG: Performed by: NURSE PRACTITIONER

## 2024-06-12 PROCEDURE — 25810000003 LACTATED RINGERS PER 1000 ML: Performed by: NURSE PRACTITIONER

## 2024-06-12 PROCEDURE — 85025 COMPLETE CBC W/AUTO DIFF WBC: CPT | Performed by: NURSE PRACTITIONER

## 2024-06-12 PROCEDURE — 93306 TTE W/DOPPLER COMPLETE: CPT | Performed by: INTERNAL MEDICINE

## 2024-06-12 PROCEDURE — 25810000003 SODIUM CHLORIDE 0.9 % SOLUTION 250 ML FLEX CONT: Performed by: NURSE PRACTITIONER

## 2024-06-12 PROCEDURE — 25010000002 AZITHROMYCIN PER 500 MG: Performed by: NURSE PRACTITIONER

## 2024-06-12 PROCEDURE — 93306 TTE W/DOPPLER COMPLETE: CPT

## 2024-06-12 PROCEDURE — 83735 ASSAY OF MAGNESIUM: CPT | Performed by: NURSE PRACTITIONER

## 2024-06-12 PROCEDURE — 80048 BASIC METABOLIC PNL TOTAL CA: CPT | Performed by: NURSE PRACTITIONER

## 2024-06-12 RX ORDER — METOPROLOL TARTRATE 50 MG/1
100 TABLET, FILM COATED ORAL EVERY 12 HOURS SCHEDULED
Status: DISCONTINUED | OUTPATIENT
Start: 2024-06-12 | End: 2024-06-16 | Stop reason: HOSPADM

## 2024-06-12 RX ORDER — FLECAINIDE ACETATE 100 MG/1
100 TABLET ORAL EVERY 12 HOURS SCHEDULED
Status: DISCONTINUED | OUTPATIENT
Start: 2024-06-12 | End: 2024-06-16 | Stop reason: HOSPADM

## 2024-06-12 RX ADMIN — PERFLUTREN 9.78 MG: 6.52 INJECTION, SUSPENSION INTRAVENOUS at 16:11

## 2024-06-12 RX ADMIN — Medication 10 ML: at 21:01

## 2024-06-12 RX ADMIN — Medication 5 MG: at 21:01

## 2024-06-12 RX ADMIN — ATORVASTATIN CALCIUM 40 MG: 40 TABLET ORAL at 21:01

## 2024-06-12 RX ADMIN — APIXABAN 5 MG: 5 TABLET, FILM COATED ORAL at 21:01

## 2024-06-12 RX ADMIN — METOPROLOL TARTRATE 50 MG: 50 TABLET, FILM COATED ORAL at 08:45

## 2024-06-12 RX ADMIN — METOPROLOL TARTRATE 100 MG: 50 TABLET, FILM COATED ORAL at 21:01

## 2024-06-12 RX ADMIN — FLECAINIDE ACETATE 100 MG: 100 TABLET ORAL at 10:24

## 2024-06-12 RX ADMIN — SENNOSIDES AND DOCUSATE SODIUM 2 TABLET: 50; 8.6 TABLET ORAL at 21:01

## 2024-06-12 RX ADMIN — CHLORHEXIDINE GLUCONATE 1 APPLICATION: 500 CLOTH TOPICAL at 06:06

## 2024-06-12 RX ADMIN — FLECAINIDE ACETATE 100 MG: 100 TABLET ORAL at 21:01

## 2024-06-12 RX ADMIN — Medication 1 APPLICATION: at 08:45

## 2024-06-12 RX ADMIN — APIXABAN 5 MG: 5 TABLET, FILM COATED ORAL at 08:45

## 2024-06-12 RX ADMIN — Medication 1 APPLICATION: at 21:01

## 2024-06-12 RX ADMIN — SODIUM CHLORIDE, POTASSIUM CHLORIDE, SODIUM LACTATE AND CALCIUM CHLORIDE 100 ML/HR: 600; 310; 30; 20 INJECTION, SOLUTION INTRAVENOUS at 08:46

## 2024-06-12 RX ADMIN — Medication 10 ML: at 08:46

## 2024-06-12 RX ADMIN — AZITHROMYCIN MONOHYDRATE 500 MG: 500 INJECTION, POWDER, LYOPHILIZED, FOR SOLUTION INTRAVENOUS at 09:32

## 2024-06-12 RX ADMIN — SODIUM CHLORIDE, POTASSIUM CHLORIDE, SODIUM LACTATE AND CALCIUM CHLORIDE 100 ML/HR: 600; 310; 30; 20 INJECTION, SOLUTION INTRAVENOUS at 18:38

## 2024-06-12 RX ADMIN — CEFTRIAXONE SODIUM 2000 MG: 2 INJECTION, POWDER, FOR SOLUTION INTRAMUSCULAR; INTRAVENOUS at 08:38

## 2024-06-12 RX ADMIN — PANTOPRAZOLE SODIUM 40 MG: 40 TABLET, DELAYED RELEASE ORAL at 05:04

## 2024-06-12 NOTE — PLAN OF CARE
Goal Outcome Evaluation:              Outcome Evaluation: A/Ox4. Cardizem restarted at begining of shift, stopped 1230. started home dose of Fleclinide. convert to NSR 1400. RA/ 2L NC. up to BSC/toilet BMx2. echo completed today.

## 2024-06-12 NOTE — PLAN OF CARE
Goal Outcome Evaluation: Melatonin ordered nightly to help with sleep per pt request. Liquid BM x 2. HR was sustaining in the 140's-150's, Cardizem gtt had to be started around 2100 last night. Has been off since 0230. Max rate was 7.5.     Problem: Adult Inpatient Plan of Care  Goal: Absence of Hospital-Acquired Illness or Injury  Intervention: Prevent Skin Injury  Recent Flowsheet Documentation  Taken 6/12/2024 0500 by Alvaro Woodward RN  Body Position: (up in chair) other (see comments)  Taken 6/12/2024 0400 by Alvaro Woodward RN  Skin Protection:   adhesive use limited   incontinence pads utilized   skin-to-device areas padded   skin-to-skin areas padded   transparent dressing maintained   tubing/devices free from skin contact  Taken 6/12/2024 0300 by Alvaro Woodward RN  Body Position: position changed independently  Taken 6/12/2024 0100 by Alvaro Woodward RN  Body Position: position changed independently  Taken 6/12/2024 0000 by Alvaro Woodward RN  Skin Protection:   adhesive use limited   incontinence pads utilized   skin-to-device areas padded   skin-to-skin areas padded   transparent dressing maintained   tubing/devices free from skin contact   silicone foam dressing in place  Taken 6/11/2024 2300 by Alvaro Woodward RN  Body Position: position changed independently  Taken 6/11/2024 2100 by Alvaro Woodward RN  Body Position: position changed independently  Taken 6/11/2024 2000 by Alvaro Woodward RN  Skin Protection:   adhesive use limited   incontinence pads utilized   skin-to-device areas padded   skin-to-skin areas padded   transparent dressing maintained   tubing/devices free from skin contact  Taken 6/11/2024 1900 by Alvaro Woodward RN  Body Position: position changed independently

## 2024-06-12 NOTE — PROGRESS NOTES
[facility] Intensivist Services  Progress Note    Date of Admission: 6/9/2024  Primary Care Physician: Provider, No Known    Subjective     Chief Complaint: Sepsis     Abdominal Pain  Associated symptoms include vomiting.   Vomiting   Associated symptoms include abdominal pain.     Rome Justice is a 73 y.o. male who presented to the emergency department today with rigors, nausea, vomiting and back pain.  CT of the abdomen and pelvis was concerning for left lower lobe pneumonia.  Incidental finding on the CT of the abdomen and pelvis revealed bilateral fullness of the ureter collecting system.  Patient met sepsis criteria by evidence of leukocytosis, bandemia, elevated lactate, tachycardia and hypotension.  He was IV fluid resuscitated appropriately for which his blood pressure responded.  He was covered empirically with broad-spectrum antibiotics Zosyn and vancomycin.     During his ED course his heart rate increased.  Longstanding history of A-fib with a recent hospitalization at the end of January for rate control.  Rate greater than 170 on initial EKG.  After adequate IV fluid resuscitation he was placed on diltiazem drip after diltiazem bolus was ineffective.     The patient does not appear to be in any acute distress.  His O2 saturations were marginal therefore he was placed on 2 L of nasal cannula to maintain sats greater than 90%.  No oxygen requirements at baseline.     I had requested a CT of the chest to be added on to his ER workup as her was not a chest x-ray for viewing to confirm pneumonia other than the incidental finding noted on the CT of the abdomen and pelvis.  He clearly appear septic source felt to be pneumonia.     I independently viewed the CT of the chest films and there is a left lobar consolidation concerning for parapneumonic effusion.     Ideally, CTA of the chest to rule out any PE would have been my first choice however the patient already had a CT  with contrast of the abdomen and pelvis prior to therefore we proceeded with a CT of the chest without IV contrast.  The patient is anticoagulant Eliquis therefore I feel a PE is less likely.           Review of Systems   Gastrointestinal:  Positive for abdominal pain and vomiting.      Otherwise complete ROS reviewed and negative except as mentioned in the HPI.    Past Medical History:   Past Medical History:   Diagnosis Date    Atrial fibrillation     Hypertension      Past Surgical History:  Past Surgical History:   Procedure Laterality Date    HERNIA REPAIR       Social History:  reports that he has never smoked. He has never used smokeless tobacco. He reports that he does not drink alcohol and does not use drugs.    Family History: family history is not on file.     Allergies:  No Known Allergies    Medications:  Prior to Admission medications    Medication Sig Start Date End Date Taking? Authorizing Provider   apixaban (Eliquis) 5 MG tablet tablet Take 1 tablet by mouth Every 12 (Twelve) Hours.   Yes Wendi Youssef MD   ascorbic acid (VITAMIN C) 500 MG tablet Take 1 tablet by mouth Daily.   Yes Wendi Youssef MD   atorvastatin (LIPITOR) 40 MG tablet Take 1 tablet by mouth Every Night.   Yes Wendi Youssef MD   Cholecalciferol 25 MCG (1000 UT) tablet Take 1 tablet by mouth Daily.   Yes Wendi Youssef MD   coenzyme Q10 100 MG capsule Take 1 capsule by mouth Daily.   Yes Wendi Youssef MD   flecainide (TAMBOCOR) 100 MG tablet Take 1 tablet by mouth 2 (Two) Times a Day.   Yes Wendi Youssef MD   glucosamine-chondroitin 500-400 MG capsule capsule Take 1 capsule by mouth 2 (Two) Times a Day With Meals.   Yes Wendi Youssef MD   lisinopril (PRINIVIL,ZESTRIL) 10 MG tablet Take 1 tablet by mouth Daily.   Yes Wendi Youssef MD   metoprolol tartrate (LOPRESSOR) 25 MG tablet Take 2 tablets by mouth 2 (Two) Times a Day.   Yes Provider, Historical, MD   Misc Natural  "Products (URINOZINC PROSTATE CLASSIC PO) Take 1 tablet by mouth Daily.   Yes Provider, MD Wendi     I have utilized all available immediate resources to obtain, update, or review the patient's current medications (including all prescriptions, over-the-counter products, herbals, cannabis/cannabidiol products, and vitamin/mineral/dietary (nutritional) supplements).    Objective     Vital Signs: /91 (BP Location: Left arm, Patient Position: Sitting)   Pulse 107   Temp 98.3 °F (36.8 °C) (Oral)   Resp 22   Ht 180.3 cm (71\")   Wt 99.8 kg (220 lb 0.3 oz)   SpO2 94%   BMI 30.69 kg/m²   Physical Exam   General :patient is comfortable not in distress    Head exam: Atraumatic ,normocephalic    Neck exam: No rigidity    Cardiac exam : normal cardiac sounds, no murmurs, no added sounds, normal rate and rhythm    Chest exam : Normal breath sounds, no crepitation, no rhonchi, normal work of breathing    Abdominal exam: Soft abdomen, no organomegaly, no tenderness    Skin exam: No rashes, no petechiae    Extremities: Normal range of movement, no edema    Neurological exam: Alert and oriented x 3, normal cranial nerves, no focal neurological deficit,       Results Reviewed:    Lab Results (last 24 hours)       Procedure Component Value Units Date/Time    Respiratory Culture - Sputum, Cough [[id number]] Collected: 06/10/24 0449    Specimen: Sputum from Cough Updated: 06/12/24 0935     Respiratory Culture Moderate growth (3+) Normal respiratory jailyn. No S. aureus or Pseudomonas aeruginosa detected. Final report.     Gram Stain Many (4+) WBCs per low power field      Rare (1+) Epithelial cells per low power field      Moderate (3+) Mixed gram positive jailyn      Moderate (3+) Mixed gram negative jailyn      Mixed intracellular organisms suggestive of an aspiration event    Basic Metabolic Panel [[id number]]  (Abnormal) Collected: 06/12/24 0156    Specimen: Blood Updated: 06/12/24 0245     Glucose 114 mg/dL      BUN 29 " mg/dL      Creatinine 1.02 mg/dL      Sodium 137 mmol/L      Potassium 4.2 mmol/L      Chloride 104 mmol/L      CO2 24.0 mmol/L      Calcium 8.0 mg/dL      BUN/Creatinine Ratio 28.4     Anion Gap 9.0 mmol/L      eGFR 78.6 mL/min/1.73     Narrative:      GFR Normal >60  Chronic Kidney Disease <60  Kidney Failure <15    The GFR formula is only valid for adults with stable renal function between ages 18 and 70.    Magnesium [[id number]]  (Normal) Collected: 06/12/24 0156    Specimen: Blood Updated: 06/12/24 0245     Magnesium 1.8 mg/dL     CBC & Differential [[id number]]  (Abnormal) Collected: 06/12/24 0156    Specimen: Blood Updated: 06/12/24 0226    Narrative:      The following orders were created for panel order CBC & Differential.  Procedure                               Abnormality         Status                     ---------                               -----------         ------                     CBC Auto Differential[[id number]]        Abnormal            Final result                 Please view results for these tests on the individual orders.    CBC Auto Differential [[id number]]  (Abnormal) Collected: 06/12/24 0156    Specimen: Blood Updated: 06/12/24 0226     WBC 15.47 10*3/mm3      RBC 3.91 10*6/mm3      Hemoglobin 11.8 g/dL      Hematocrit 36.1 %      MCV 92.3 fL      MCH 30.2 pg      MCHC 32.7 g/dL      RDW 13.9 %      RDW-SD 47.7 fl      MPV 10.9 fL      Platelets 246 10*3/mm3      Neutrophil % 86.9 %      Lymphocyte % 4.8 %      Monocyte % 6.4 %      Eosinophil % 0.8 %      Basophil % 0.3 %      Immature Grans % 0.8 %      Neutrophils, Absolute 13.45 10*3/mm3      Lymphocytes, Absolute 0.74 10*3/mm3      Monocytes, Absolute 0.99 10*3/mm3      Eosinophils, Absolute 0.12 10*3/mm3      Basophils, Absolute 0.04 10*3/mm3      Immature Grans, Absolute 0.13 10*3/mm3      nRBC 0.0 /100 WBC     Blood Culture - Blood, Hand, Left [[id number]]  (Normal) Collected: 06/10/24 0114    Specimen: Blood from Hand, Left  Updated: 06/12/24 0201     Blood Culture No growth at 2 days    Blood Culture - Blood, Arm, Right [[id number]]  (Normal) Collected: 06/10/24 0122    Specimen: Blood from Arm, Right Updated: 06/12/24 0201     Blood Culture No growth at 2 days    Phosphorus [[id number]]  (Normal) Collected: 06/11/24 1427    Specimen: Blood Updated: 06/11/24 1510     Phosphorus 2.8 mg/dL             XR Chest 1 View    Result Date: 6/11/2024  EXAMINATION: XR CHEST 1 VW- 6/11/2024 10:31 AM  HISTORY: pna; I48.91-Unspecified atrial fibrillation; A41.9-Sepsis, unspecified organism; N17.9-Acute kidney failure, unspecified; J18.9-Pneumonia, unspecified organism; R09.02-Hypoxemia.  REPORT: A frontal view of the chest was obtained.  COMPARISON: CT chest without contrast Ramya 10, 2024.  There is extensive consolidation of the left lung, greatest at the lung base with obscuration of the left mid diaphragm. There are air bronchograms in the perihilar region. There is mild atelectasis in the right lung base. Left heart margin is mostly obscured, there is no obvious cardiomegaly or evidence of CHF. No pneumothorax is identified. Underlying left pleural effusion cannot be excluded, though none is seen on the CT.. The osseous structures are unremarkable.      Impression: Extensive consolidating left-sided pneumonia.    This report was signed and finalized on 6/11/2024 10:33 AM by Dr. Scott Mitchell MD.       Assessment / Plan   Assessment and Plan    Active Hospital Problems    Diagnosis     **Sepsis     Pneumonia involving left lung     Chronic atrial fibrillation with rapid ventricular response     Abnormal CT of the abdomen     Acute respiratory failure with hypoxia    Acute hypoxic respiratory failure secondary to left lower lobe lobar pneumonia patient was put on broad-spectrum antibiotics the pneumonia panel was asked for he is closely monitored in the ICU    Atrial fibrillation possibly precipitated by the pneumonia patient is on diltiazem drip  for rate control closely monitored in the ICU atrial fibrillation still not well-controlled   It was started back on flecainide and we increase his metoprolol we are still watching his heart rate closely    White cell count still elevated but responding to treatment    Abnormal renal CT scan urology has signed off and they will follow him however in the clinic    The critical care time does not include time spent on separa.I provided 40 minutes of total critical care time. Due to the high probability of clinically significant, life-threatening deterioration, the patient required my direct and personal management. The critical care time does not include time spent on separately billable procedures.    Anticoagulation for A-fib and DVT prophylaxis      I provided 45 minutes of total critical care time. Due to the high probability of clinically significant, life-threatening deterioration, the patient required my direct and personal management. The critical care time does not include time spent on separately billable procedures.    Code Status:    .     Electronically signed by Hiwot Abraham MD on 6/12/2024 at 12:52 CDT

## 2024-06-13 LAB
ANION GAP SERPL CALCULATED.3IONS-SCNC: 8 MMOL/L (ref 5–15)
BASOPHILS # BLD AUTO: 0.08 10*3/MM3 (ref 0–0.2)
BASOPHILS NFR BLD AUTO: 0.6 % (ref 0–1.5)
BUN SERPL-MCNC: 23 MG/DL (ref 8–23)
BUN/CREAT SERPL: 24.7 (ref 7–25)
CALCIUM SPEC-SCNC: 8.2 MG/DL (ref 8.6–10.5)
CHLORIDE SERPL-SCNC: 106 MMOL/L (ref 98–107)
CO2 SERPL-SCNC: 25 MMOL/L (ref 22–29)
CREAT SERPL-MCNC: 0.93 MG/DL (ref 0.76–1.27)
DEPRECATED RDW RBC AUTO: 47.8 FL (ref 37–54)
EGFRCR SERPLBLD CKD-EPI 2021: 87.8 ML/MIN/1.73
EOSINOPHIL # BLD AUTO: 0.25 10*3/MM3 (ref 0–0.4)
EOSINOPHIL NFR BLD AUTO: 1.8 % (ref 0.3–6.2)
ERYTHROCYTE [DISTWIDTH] IN BLOOD BY AUTOMATED COUNT: 14 % (ref 12.3–15.4)
GLUCOSE SERPL-MCNC: 116 MG/DL (ref 65–99)
HCT VFR BLD AUTO: 33.6 % (ref 37.5–51)
HGB BLD-MCNC: 11.1 G/DL (ref 13–17.7)
IMM GRANULOCYTES # BLD AUTO: 0.31 10*3/MM3 (ref 0–0.05)
IMM GRANULOCYTES NFR BLD AUTO: 2.2 % (ref 0–0.5)
LYMPHOCYTES # BLD AUTO: 0.77 10*3/MM3 (ref 0.7–3.1)
LYMPHOCYTES NFR BLD AUTO: 5.6 % (ref 19.6–45.3)
MCH RBC QN AUTO: 30.4 PG (ref 26.6–33)
MCHC RBC AUTO-ENTMCNC: 33 G/DL (ref 31.5–35.7)
MCV RBC AUTO: 92.1 FL (ref 79–97)
MONOCYTES # BLD AUTO: 1.28 10*3/MM3 (ref 0.1–0.9)
MONOCYTES NFR BLD AUTO: 9.3 % (ref 5–12)
NEUTROPHILS NFR BLD AUTO: 11.1 10*3/MM3 (ref 1.7–7)
NEUTROPHILS NFR BLD AUTO: 80.5 % (ref 42.7–76)
NRBC BLD AUTO-RTO: 0 /100 WBC (ref 0–0.2)
PLATELET # BLD AUTO: 257 10*3/MM3 (ref 140–450)
PMV BLD AUTO: 10.7 FL (ref 6–12)
POTASSIUM SERPL-SCNC: 4.4 MMOL/L (ref 3.5–5.2)
RBC # BLD AUTO: 3.65 10*6/MM3 (ref 4.14–5.8)
SODIUM SERPL-SCNC: 139 MMOL/L (ref 136–145)
WBC NRBC COR # BLD AUTO: 13.79 10*3/MM3 (ref 3.4–10.8)

## 2024-06-13 PROCEDURE — 25010000002 CEFTRIAXONE PER 250 MG: Performed by: NURSE PRACTITIONER

## 2024-06-13 PROCEDURE — 80048 BASIC METABOLIC PNL TOTAL CA: CPT | Performed by: NURSE PRACTITIONER

## 2024-06-13 PROCEDURE — 25810000003 LACTATED RINGERS PER 1000 ML: Performed by: NURSE PRACTITIONER

## 2024-06-13 PROCEDURE — 85025 COMPLETE CBC W/AUTO DIFF WBC: CPT | Performed by: NURSE PRACTITIONER

## 2024-06-13 RX ADMIN — Medication 10 ML: at 08:07

## 2024-06-13 RX ADMIN — FLECAINIDE ACETATE 100 MG: 100 TABLET ORAL at 08:06

## 2024-06-13 RX ADMIN — APIXABAN 5 MG: 5 TABLET, FILM COATED ORAL at 20:49

## 2024-06-13 RX ADMIN — FLECAINIDE ACETATE 100 MG: 100 TABLET ORAL at 20:49

## 2024-06-13 RX ADMIN — APIXABAN 5 MG: 5 TABLET, FILM COATED ORAL at 08:06

## 2024-06-13 RX ADMIN — METOPROLOL TARTRATE 100 MG: 50 TABLET, FILM COATED ORAL at 08:06

## 2024-06-13 RX ADMIN — PANTOPRAZOLE SODIUM 40 MG: 40 TABLET, DELAYED RELEASE ORAL at 05:31

## 2024-06-13 RX ADMIN — Medication 1 APPLICATION: at 08:06

## 2024-06-13 RX ADMIN — CEFTRIAXONE SODIUM 2000 MG: 2 INJECTION, POWDER, FOR SOLUTION INTRAMUSCULAR; INTRAVENOUS at 07:56

## 2024-06-13 RX ADMIN — CHLORHEXIDINE GLUCONATE 1 APPLICATION: 500 CLOTH TOPICAL at 04:07

## 2024-06-13 RX ADMIN — SENNOSIDES AND DOCUSATE SODIUM 2 TABLET: 50; 8.6 TABLET ORAL at 20:49

## 2024-06-13 RX ADMIN — SODIUM CHLORIDE, POTASSIUM CHLORIDE, SODIUM LACTATE AND CALCIUM CHLORIDE 100 ML/HR: 600; 310; 30; 20 INJECTION, SOLUTION INTRAVENOUS at 05:31

## 2024-06-13 RX ADMIN — Medication 5 MG: at 20:49

## 2024-06-13 RX ADMIN — METOPROLOL TARTRATE 100 MG: 50 TABLET, FILM COATED ORAL at 20:49

## 2024-06-13 RX ADMIN — Medication 10 ML: at 20:50

## 2024-06-13 RX ADMIN — Medication 1 APPLICATION: at 20:49

## 2024-06-13 RX ADMIN — ATORVASTATIN CALCIUM 40 MG: 40 TABLET ORAL at 20:49

## 2024-06-13 NOTE — CASE MANAGEMENT/SOCIAL WORK
Continued Stay Note  [facility]     Patient Name: Rome Justice  MRN: [id number]  Today's Date: 6/13/2024    Admit Date: 6/9/2024    Plan: Pending   Discharge Plan       Row Name 06/13/24 0930       Plan    Plan Pending    Plan Comments Patient continues in ICU.  Patient resides alone.  SW following for plan/needs.                   Discharge Codes    No documentation.                       RUPALI Ledezma

## 2024-06-13 NOTE — PROGRESS NOTES
[facility] Intensivist Services  INPATIENT PROGRESS NOTE    Patient Name: Rome Justice  Date of Admission: 6/9/2024  Today's Date: 06/13/24  Length of Stay: 3  Primary Care Physician: Provider, No Known    Subjective   Chief Complaint:   Abdominal Pain  Associated symptoms include vomiting.   Vomiting   Associated symptoms include abdominal pain.      At admission 6/10  Rome Justice is a 73 y.o. male who presented to the emergency department today with rigors, nausea, vomiting and back pain.  CT of the abdomen and pelvis was concerning for left lower lobe pneumonia.  Incidental finding on the CT of the abdomen and pelvis revealed bilateral fullness of the ureter collecting system.  Patient met sepsis criteria by evidence of leukocytosis, bandemia, elevated lactate, tachycardia and hypotension.  He was IV fluid resuscitated appropriately for which his blood pressure responded.  He was covered empirically with broad-spectrum antibiotics Zosyn and vancomycin.     During his ED course his heart rate increased.  Longstanding history of A-fib with a recent hospitalization at the end of January for rate control.  Rate greater than 170 on initial EKG.  After adequate IV fluid resuscitation he was placed on diltiazem drip after diltiazem bolus was ineffective.     The patient does not appear to be in any acute distress.  His O2 saturations were marginal therefore he was placed on 2 L of nasal cannula to maintain sats greater than 90%.  No oxygen requirements at baseline.    Interval History:  6/11  Acute hypoxic respiratory failure secondary to left lower lobe lobar pneumonia patient was put on broad-spectrum antibiotics the pneumonia panel was asked for he is closely monitored. Atrial fibrillation possibly precipitated by the pneumonia patient is on diltiazem drip for rate control closely monitored in the ICU  White cell count still elevated but responding to treatmentI and personal  management.  6/12  Started back on home flecainide, will increase metoprolol, continue to watch closely in ICU. WBC is responding to treatment.   6/13  Sitting up in bedside chair, reports that he feels exhausted but believes he is feeling better. On RA, no work of breathing. Nursing report no overnight events, or early morning issues.     Review of Systems   Gastrointestinal:  Positive for abdominal pain and vomiting.      All pertinent negatives and positives are as above. All other systems have been reviewed and are negative unless otherwise stated.     Objective    Temp:  [98.1 °F (36.7 °C)-98.6 °F (37 °C)] 98.1 °F (36.7 °C)  Heart Rate:  [59-88] 72  Resp:  [16-18] 16  BP: (103-147)/() 133/73  Physical Exam  Vitals and nursing note reviewed.   Constitutional:       Appearance: Normal appearance.   HENT:      Head: Normocephalic.      Nose: Nose normal.      Mouth/Throat:      Mouth: Mucous membranes are moist.      Pharynx: Oropharynx is clear.   Eyes:      Extraocular Movements: Extraocular movements intact.      Pupils: Pupils are equal, round, and reactive to light.   Cardiovascular:      Rate and Rhythm: Normal rate. Rhythm irregular.      Pulses: Normal pulses.      Heart sounds: Normal heart sounds.   Pulmonary:      Effort: Pulmonary effort is normal.      Breath sounds: Normal breath sounds.   Abdominal:      General: Abdomen is flat. Bowel sounds are normal.      Palpations: Abdomen is soft.   Musculoskeletal:         General: Normal range of motion.      Cervical back: Normal range of motion and neck supple.      Right lower leg: Edema present.      Left lower leg: Edema present.   Skin:     General: Skin is warm and dry.      Capillary Refill: Capillary refill takes less than 2 seconds.   Neurological:      General: No focal deficit present.      Mental Status: He is alert and oriented to person, place, and time.   Psychiatric:         Mood and Affect: Mood normal.         Behavior: Behavior  normal.         Thought Content: Thought content normal.         Judgment: Judgment normal.         Results Review:    Result Review:  I have personally reviewed the results from the time of this admission to 6/13/2024 16:07 CDT and agree with these findings:  [x]  Laboratory list / accordion  [x]  Microbiology  []  Radiology  []  EKG/Telemetry   [x]  Cardiology/Vascular   []  Pathology  []  Old records  []  Other:  Most notable findings include:       Culture Data:   Blood Culture   Date Value Ref Range Status   06/10/2024 No growth at 3 days  Preliminary   06/10/2024 No growth at 3 days  Preliminary     Respiratory Culture   Date Value Ref Range Status   06/10/2024   Final    Moderate growth (3+) Normal respiratory jailyn. No S. aureus or Pseudomonas aeruginosa detected. Final report.       I have reviewed the patient's current medications.     Assessment/Plan   Assessment  Active Hospital Problems    Diagnosis     **Sepsis     Pneumonia involving left lung     Chronic atrial fibrillation with rapid ventricular response     Abnormal CT of the abdomen     Acute respiratory failure with hypoxia    Mr. Justice is a 73-year-old male who presented to [facility] ER for abdominal plain believed to be renal stone as he has had prior history. CT of abdomen pelvis showed no renal calculi, further evaluated by urology and found to have possible pseudotumor, patient follows urologist closer to home in [facility], urologist advised that he could follow-up in the outpatient setting and signed off.   During ER evaluation he was also found to have left lobe pneumonia and was found to be septic, therefore was admitted to ICU for close monitoring.  The sepsis pneumonia was complicated by chronic history of A-fib.  During hospital course he had A-fib RVR and spent couple of days on diltiazem drip, stopped 6/12/2024.  Home dose flecainide restarted, and increased home dose metoprolol.  WBC was slow to respond to treatment with initial  WBC at admission 24K now 13K.   I have discussed with the patient that I feel he is doing well and is having no O2 requirements and rate is much more controlled therefore I will plan for transfer out of the unit today to the medical floor for further medical management.  He voiced understanding of this information was agreeable to this treatment plan.  He voiced happiness medical care provided.    Treatment Plan    Acute hypoxic respiratory failure  -Left lower lower lobe lobar pneumonia  -Completing azithromycin and Rocephin  -WBC initial 24,000, today 13,000  -No O2 requirements    Chronic A-fib  -Diltiazem drip stopped 6/12  -Restarted home flecainide.  Increased home dose metoprolol 100 mg twice daily.  -Rate is currently controlled    Abnormal renal CT scan  -Urology has seen and evaluated and signed off  -Patient will follow-up with his urologist in the outpatient setting      Medical Decision Making  Number and Complexity of problems: 3   Differential Diagnosis: PE, CVA, MI, sepsis    Conditions and Status        Condition is improving.     MDM Data  External documents reviewed: EPIC  Cardiac tracing (EKG, telemetry) interpretation:   Radiology interpretation:   Labs reviewed:   Any tests that were considered but not ordered:      Decision rules/scores evaluated (example [id number], Wells, etc):      Discussed with: patient      Care Planning  Shared decision making: Patient, nursing team, consultants, intensivist MD, Dr. Abraham  Code status and discussions: Full support      Disposition  Social Determinants of Health that impact treatment or disposition:   I expect the patient to be discharged to medical today.     Electronically signed by XAVIER Eagel on 6/13/2024 at 1100.

## 2024-06-13 NOTE — PLAN OF CARE
Problem: Adult Inpatient Plan of Care  Goal: Plan of Care Review  Outcome: Ongoing, Progressing  Goal: Patient-Specific Goal (Individualized)  Outcome: Ongoing, Progressing  Goal: Absence of Hospital-Acquired Illness or Injury  Outcome: Ongoing, Progressing  Intervention: Identify and Manage Fall Risk  Recent Flowsheet Documentation  Taken 6/13/2024 1702 by Mc Newton, RN  Safety Promotion/Fall Prevention: safety round/check completed  Intervention: Prevent Skin Injury  Recent Flowsheet Documentation  Taken 6/13/2024 1702 by Mc Newton RN  Body Position: position changed independently  Goal: Optimal Comfort and Wellbeing  Outcome: Ongoing, Progressing  Goal: Readiness for Transition of Care  Outcome: Ongoing, Progressing     Problem: Adjustment to Illness (Sepsis/Septic Shock)  Goal: Optimal Coping  Outcome: Ongoing, Progressing     Problem: Bleeding (Sepsis/Septic Shock)  Goal: Absence of Bleeding  Outcome: Ongoing, Progressing     Problem: Glycemic Control Impaired (Sepsis/Septic Shock)  Goal: Blood Glucose Level Within Desired Range  Outcome: Ongoing, Progressing     Problem: Infection Progression (Sepsis/Septic Shock)  Goal: Absence of Infection Signs and Symptoms  Outcome: Ongoing, Progressing     Problem: Nutrition Impaired (Sepsis/Septic Shock)  Goal: Optimal Nutrition Intake  Outcome: Ongoing, Progressing     Problem: Asthma Comorbidity  Goal: Maintenance of Asthma Control  Outcome: Ongoing, Progressing     Problem: Behavioral Health Comorbidity  Goal: Maintenance of Behavioral Health Symptom Control  Outcome: Ongoing, Progressing     Problem: COPD (Chronic Obstructive Pulmonary Disease) Comorbidity  Goal: Maintenance of COPD Symptom Control  Outcome: Ongoing, Progressing     Problem: Diabetes Comorbidity  Goal: Blood Glucose Level Within Targeted Range  Outcome: Ongoing, Progressing     Problem: Heart Failure Comorbidity  Goal: Maintenance of Heart Failure Symptom Control  Outcome: Ongoing,  Progressing     Problem: Hypertension Comorbidity  Goal: Blood Pressure in Desired Range  Outcome: Ongoing, Progressing     Problem: Obstructive Sleep Apnea Risk or Actual Comorbidity Management  Goal: Unobstructed Breathing During Sleep  Outcome: Ongoing, Progressing     Problem: Osteoarthritis Comorbidity  Goal: Maintenance of Osteoarthritis Symptom Control  Outcome: Ongoing, Progressing     Problem: Pain Chronic (Persistent) (Comorbidity Management)  Goal: Acceptable Pain Control and Functional Ability  Outcome: Ongoing, Progressing     Problem: Seizure Disorder Comorbidity  Goal: Maintenance of Seizure Control  Outcome: Ongoing, Progressing     Problem: Fall Injury Risk  Goal: Absence of Fall and Fall-Related Injury  Outcome: Ongoing, Progressing  Intervention: Promote Injury-Free Environment  Recent Flowsheet Documentation  Taken 6/13/2024 1702 by Mc Newton, RN  Safety Promotion/Fall Prevention: safety round/check completed   Goal Outcome Evaluation:

## 2024-06-13 NOTE — PLAN OF CARE
Problem: Adult Inpatient Plan of Care  Goal: Plan of Care Review  Outcome: Ongoing, Progressing  Goal: Patient-Specific Goal (Individualized)  Outcome: Ongoing, Progressing  Goal: Absence of Hospital-Acquired Illness or Injury  Outcome: Ongoing, Progressing  Intervention: Identify and Manage Fall Risk  Recent Flowsheet Documentation  Taken 6/13/2024 0500 by Edu Dixon RN  Safety Promotion/Fall Prevention:   safety round/check completed   room organization consistent   fall prevention program maintained   clutter free environment maintained   assistive device/personal items within reach  Taken 6/13/2024 0400 by Edu Dixon RN  Safety Promotion/Fall Prevention:   safety round/check completed   room organization consistent   nonskid shoes/slippers when out of bed   assistive device/personal items within reach   clutter free environment maintained   fall prevention program maintained  Taken 6/13/2024 0300 by Edu Dixon RN  Safety Promotion/Fall Prevention:   safety round/check completed   room organization consistent   fall prevention program maintained   clutter free environment maintained   assistive device/personal items within reach  Taken 6/13/2024 0200 by Edu Dixon RN  Safety Promotion/Fall Prevention:   safety round/check completed   room organization consistent   fall prevention program maintained   clutter free environment maintained   assistive device/personal items within reach  Taken 6/13/2024 0100 by Edu Dixon RN  Safety Promotion/Fall Prevention:   safety round/check completed   room organization consistent   fall prevention program maintained   clutter free environment maintained   assistive device/personal items within reach  Taken 6/13/2024 0000 by Edu Dixon RN  Safety Promotion/Fall Prevention:   safety round/check completed   room organization consistent   nonskid shoes/slippers when out of bed   fall prevention program maintained   clutter free environment  maintained   assistive device/personal items within reach  Taken 6/12/2024 2300 by Edu Dixon RN  Safety Promotion/Fall Prevention:   safety round/check completed   room organization consistent   fall prevention program maintained   clutter free environment maintained   assistive device/personal items within reach  Taken 6/12/2024 2200 by Eud Dixon RN  Safety Promotion/Fall Prevention:   safety round/check completed   room organization consistent   fall prevention program maintained   clutter free environment maintained   assistive device/personal items within reach  Taken 6/12/2024 2000 by Edu Dixon RN  Safety Promotion/Fall Prevention:   safety round/check completed   room organization consistent   fall prevention program maintained   clutter free environment maintained   assistive device/personal items within reach  Intervention: Prevent Skin Injury  Recent Flowsheet Documentation  Taken 6/13/2024 0400 by Edu Dixon RN  Body Position: position changed independently  Taken 6/13/2024 0200 by Edu Dixon RN  Body Position: position changed independently  Taken 6/13/2024 0100 by Edu Dixon RN  Body Position: position changed independently  Taken 6/13/2024 0000 by Edu Dixon RN  Body Position:   position changed independently   side-lying  Taken 6/12/2024 2200 by Edu Dixon RN  Body Position: position changed independently  Taken 6/12/2024 2000 by Edu Dixon RN  Body Position: position changed independently  Skin Protection: silicone foam dressing in place  Intervention: Prevent and Manage VTE (Venous Thromboembolism) Risk  Recent Flowsheet Documentation  Taken 6/12/2024 2000 by Edu Dixon RN  Activity Management: up to bedside commode  VTE Prevention/Management: (See MAR) other (see comments)  Intervention: Prevent Infection  Recent Flowsheet Documentation  Taken 6/13/2024 0500 by Edu Dixon RN  Infection Prevention:   cohorting utilized   single patient room  provided   rest/sleep promoted  Taken 6/13/2024 0400 by Edu Dixon RN  Infection Prevention:   cohorting utilized   environmental surveillance performed   personal protective equipment utilized   single patient room provided   rest/sleep promoted  Taken 6/13/2024 0300 by Edu Dixon RN  Infection Prevention:   cohorting utilized   single patient room provided   rest/sleep promoted  Taken 6/13/2024 0200 by Edu Dixon RN  Infection Prevention:   cohorting utilized   single patient room provided   rest/sleep promoted  Taken 6/13/2024 0100 by Edu Dixon RN  Infection Prevention:   cohorting utilized   single patient room provided   rest/sleep promoted  Taken 6/13/2024 0000 by Edu Dixon RN  Infection Prevention:   single patient room provided   rest/sleep promoted   equipment surfaces disinfected   environmental surveillance performed   cohorting utilized  Taken 6/12/2024 2300 by Edu Dixon RN  Infection Prevention:   cohorting utilized   single patient room provided   rest/sleep promoted  Taken 6/12/2024 2200 by Edu Dixon RN  Infection Prevention:   cohorting utilized   single patient room provided   rest/sleep promoted  Taken 6/12/2024 2000 by Edu Dixon RN  Infection Prevention:   cohorting utilized   single patient room provided   rest/sleep promoted  Goal: Optimal Comfort and Wellbeing  Outcome: Ongoing, Progressing  Intervention: Provide Person-Centered Care  Recent Flowsheet Documentation  Taken 6/12/2024 2000 by Edu Dixon RN  Trust Relationship/Rapport:   care explained   thoughts/feelings acknowledged   questions encouraged   questions answered   emotional support provided  Goal: Readiness for Transition of Care  Outcome: Ongoing, Progressing   Goal Outcome Evaluation:

## 2024-06-13 NOTE — NURSING NOTE
[facility]  INPATIENT WOUND & OSTOMY CARE    Today's Date: 06/13/24    Patient Name: Rome Justice  MRN: [id number]  [facility]: [id number]  PCP: Provider, No Known  Attending Provider: Hiwot Abraham MD  Length of Stay: 3    Pressure injury prevention measure orders per protocol due to patient being at risk for skin breakdown and being admitted to the unit.      Silicone foam border dressing ordered to be applied to sacral spine for protection.  Nursing to change dressing every 3 days and PRN if soiled. Nursing is to peel back dressing with every assessment to assess skin underneath dressing. Dressing is in place to sacral spine per his nurse Lexii.     Patient's bed has a  comfort glide with an absorbant pad. Wedges are at bedside for nursing to utilizing for turning. He is currently sitting up in his recliner with a waffle chair cushion for offloading.     Please reach out to wound care nurse if any skin issues arise.        This document has been electronically signed by Gilma Crockett RN on 6/13/2024 11:48 CDT

## 2024-06-14 LAB
ANION GAP SERPL CALCULATED.3IONS-SCNC: 9 MMOL/L (ref 5–15)
BASOPHILS # BLD AUTO: 0.08 10*3/MM3 (ref 0–0.2)
BASOPHILS NFR BLD AUTO: 0.6 % (ref 0–1.5)
BUN SERPL-MCNC: 22 MG/DL (ref 8–23)
BUN/CREAT SERPL: 21.4 (ref 7–25)
CALCIUM SPEC-SCNC: 8.3 MG/DL (ref 8.6–10.5)
CHLORIDE SERPL-SCNC: 102 MMOL/L (ref 98–107)
CO2 SERPL-SCNC: 26 MMOL/L (ref 22–29)
CREAT SERPL-MCNC: 1.03 MG/DL (ref 0.76–1.27)
DEPRECATED RDW RBC AUTO: 47.7 FL (ref 37–54)
EGFRCR SERPLBLD CKD-EPI 2021: 77.7 ML/MIN/1.73
EOSINOPHIL # BLD AUTO: 0.38 10*3/MM3 (ref 0–0.4)
EOSINOPHIL NFR BLD AUTO: 2.9 % (ref 0.3–6.2)
ERYTHROCYTE [DISTWIDTH] IN BLOOD BY AUTOMATED COUNT: 14 % (ref 12.3–15.4)
GLUCOSE SERPL-MCNC: 107 MG/DL (ref 65–99)
HCT VFR BLD AUTO: 35.5 % (ref 37.5–51)
HGB BLD-MCNC: 11.6 G/DL (ref 13–17.7)
IMM GRANULOCYTES # BLD AUTO: 0.5 10*3/MM3 (ref 0–0.05)
IMM GRANULOCYTES NFR BLD AUTO: 3.9 % (ref 0–0.5)
LYMPHOCYTES # BLD AUTO: 0.9 10*3/MM3 (ref 0.7–3.1)
LYMPHOCYTES NFR BLD AUTO: 7 % (ref 19.6–45.3)
MAGNESIUM SERPL-MCNC: 1.9 MG/DL (ref 1.6–2.4)
MCH RBC QN AUTO: 30.3 PG (ref 26.6–33)
MCHC RBC AUTO-ENTMCNC: 32.7 G/DL (ref 31.5–35.7)
MCV RBC AUTO: 92.7 FL (ref 79–97)
MONOCYTES # BLD AUTO: 1.33 10*3/MM3 (ref 0.1–0.9)
MONOCYTES NFR BLD AUTO: 10.3 % (ref 5–12)
NEUTROPHILS NFR BLD AUTO: 75.3 % (ref 42.7–76)
NEUTROPHILS NFR BLD AUTO: 9.7 10*3/MM3 (ref 1.7–7)
NRBC BLD AUTO-RTO: 0 /100 WBC (ref 0–0.2)
PLATELET # BLD AUTO: 299 10*3/MM3 (ref 140–450)
PMV BLD AUTO: 10.6 FL (ref 6–12)
POTASSIUM SERPL-SCNC: 3.9 MMOL/L (ref 3.5–5.2)
RBC # BLD AUTO: 3.83 10*6/MM3 (ref 4.14–5.8)
SODIUM SERPL-SCNC: 137 MMOL/L (ref 136–145)
WBC NRBC COR # BLD AUTO: 12.89 10*3/MM3 (ref 3.4–10.8)

## 2024-06-14 PROCEDURE — 80048 BASIC METABOLIC PNL TOTAL CA: CPT | Performed by: NURSE PRACTITIONER

## 2024-06-14 PROCEDURE — 25010000002 CEFTRIAXONE PER 250 MG: Performed by: NURSE PRACTITIONER

## 2024-06-14 PROCEDURE — 83735 ASSAY OF MAGNESIUM: CPT | Performed by: STUDENT IN AN ORGANIZED HEALTH CARE EDUCATION/TRAINING PROGRAM

## 2024-06-14 PROCEDURE — 85025 COMPLETE CBC W/AUTO DIFF WBC: CPT | Performed by: NURSE PRACTITIONER

## 2024-06-14 PROCEDURE — 25010000002 FUROSEMIDE PER 20 MG: Performed by: STUDENT IN AN ORGANIZED HEALTH CARE EDUCATION/TRAINING PROGRAM

## 2024-06-14 RX ORDER — DILTIAZEM HCL/D5W 125 MG/125
5-15 PLASTIC BAG, INJECTION (ML) INTRAVENOUS
Status: DISCONTINUED | OUTPATIENT
Start: 2024-06-15 | End: 2024-06-16 | Stop reason: HOSPADM

## 2024-06-14 RX ORDER — METOPROLOL TARTRATE 1 MG/ML
5 INJECTION, SOLUTION INTRAVENOUS EVERY 4 HOURS PRN
Status: DISCONTINUED | OUTPATIENT
Start: 2024-06-14 | End: 2024-06-16 | Stop reason: HOSPADM

## 2024-06-14 RX ORDER — FUROSEMIDE 10 MG/ML
40 INJECTION INTRAMUSCULAR; INTRAVENOUS ONCE
Status: COMPLETED | OUTPATIENT
Start: 2024-06-14 | End: 2024-06-14

## 2024-06-14 RX ORDER — FUROSEMIDE 10 MG/ML
40 INJECTION INTRAMUSCULAR; INTRAVENOUS 2 TIMES DAILY WITH MEALS
Status: DISCONTINUED | OUTPATIENT
Start: 2024-06-14 | End: 2024-06-16 | Stop reason: HOSPADM

## 2024-06-14 RX ADMIN — Medication 10 ML: at 20:00

## 2024-06-14 RX ADMIN — Medication 10 ML: at 09:48

## 2024-06-14 RX ADMIN — METOPROLOL TARTRATE 100 MG: 50 TABLET, FILM COATED ORAL at 09:48

## 2024-06-14 RX ADMIN — CEFTRIAXONE SODIUM 2000 MG: 2 INJECTION, POWDER, FOR SOLUTION INTRAMUSCULAR; INTRAVENOUS at 09:48

## 2024-06-14 RX ADMIN — APIXABAN 5 MG: 5 TABLET, FILM COATED ORAL at 20:00

## 2024-06-14 RX ADMIN — FLECAINIDE ACETATE 100 MG: 100 TABLET ORAL at 20:00

## 2024-06-14 RX ADMIN — ATORVASTATIN CALCIUM 40 MG: 40 TABLET ORAL at 20:00

## 2024-06-14 RX ADMIN — FLECAINIDE ACETATE 100 MG: 100 TABLET ORAL at 09:44

## 2024-06-14 RX ADMIN — APIXABAN 5 MG: 5 TABLET, FILM COATED ORAL at 09:44

## 2024-06-14 RX ADMIN — Medication 5 MG: at 23:02

## 2024-06-14 RX ADMIN — PANTOPRAZOLE SODIUM 40 MG: 40 TABLET, DELAYED RELEASE ORAL at 05:22

## 2024-06-14 RX ADMIN — FUROSEMIDE 40 MG: 10 INJECTION, SOLUTION INTRAMUSCULAR; INTRAVENOUS at 12:40

## 2024-06-14 RX ADMIN — FUROSEMIDE 40 MG: 10 INJECTION, SOLUTION INTRAVENOUS at 19:59

## 2024-06-14 RX ADMIN — METOPROLOL TARTRATE 5 MG: 5 INJECTION INTRAVENOUS at 23:02

## 2024-06-14 RX ADMIN — METOPROLOL TARTRATE 100 MG: 50 TABLET, FILM COATED ORAL at 20:00

## 2024-06-14 NOTE — PAYOR COMM NOTE
"6/13 CLINICAL  [id number]    [phone]    Rome Weinberg (73 y.o. Male)       Date of Birth   1950    Social Security Number       Address   [address] [place] [address]    Home Phone   [phone]    MRN   [id number]       Anglican   Other    Marital Status                               Admission Date   6/9/24    Admission Type   Emergency    Admitting Provider   Hiwot Abraham MD    Attending Provider   Pascual Bell MD    Department, Room/Bed   [facility] 4B, 446/1       Discharge Date       Discharge Disposition       Discharge Destination                                 Attending Provider: Pascual Bell MD    Allergies: No Known Allergies    Isolation: None   Infection: None   Code Status: CPR    Ht: 180.3 cm (71\")   Wt: 105 kg (232 lb 6.4 oz)    Admission Cmt: None   Principal Problem: Sepsis [A41.9]                   Active Insurance as of 6/9/2024       Primary Coverage       Payor Plan Insurance Group Employer/Plan Group    ANTHEM MEDICARE REPLACEMENT ANTHEM MEDICARE ADVANTAGE [id number]       Payor Plan Address Payor Plan Phone Number Payor Plan Fax Number Effective Dates    [address] [phone]  1/1/2024 - None Entered    [facility] [address]         Subscriber Name Subscriber Birth Date Member ID       ROME WEINBERG 1950 [id number]               Secondary Coverage       Payor Plan Insurance Group Employer/Plan Group    [id number]        Payor Plan Address Payor Plan Phone Number Payor Plan Fax Number Effective Dates    [address]   6/9/2024 - None Entered    [facility] [id number]         Subscriber Name Subscriber Birth Date Member ID       ROME WEINBERG 1950 [id number]                     Emergency Contacts        (Rel.) Home Phone Work Phone Mobile Phone    KRISTYN FARMER (Sister) -- -- [phone]              Current Facility-Administered Medications   Medication Dose Route Frequency " Provider Last Rate Last Admin    acetaminophen (TYLENOL) tablet 650 mg  650 mg Oral Q4H PRN Anny Chappell APRN        Or    acetaminophen (TYLENOL) suppository 650 mg  650 mg Rectal Q4H PRN Anny Chappell APRN        apixaban (ELIQUIS) tablet 5 mg  5 mg Oral Q12H Anny Chappell APRN   5 mg at 06/13/24 2049    atorvastatin (LIPITOR) tablet 40 mg  40 mg Oral Nightly Anny Chappell APRN   40 mg at 06/13/24 2049    sennosides-docusate (PERICOLACE) 8.6-50 MG per tablet 2 tablet  2 tablet Oral BID Anny Chappell APRN   2 tablet at 06/13/24 2049    And    polyethylene glycol (MIRALAX) packet 17 g  17 g Oral Daily PRN Anny Chappell APRN        And    bisacodyl (DULCOLAX) EC tablet 5 mg  5 mg Oral Daily PRN Anny Chappell APRN        And    bisacodyl (DULCOLAX) suppository 10 mg  10 mg Rectal Daily PRN Anny Chappell APRN        Calcium Replacement - Follow Nurse / BPA Driven Protocol   Does not apply PRN Anny Chappell APRN        cefTRIAXone (ROCEPHIN) 2,000 mg in sodium chloride 0.9 % 100 mL MBP  2,000 mg Intravenous Q24H Anny Chappell APRN 200 mL/hr at 06/13/24 0756 2,000 mg at 06/13/24 0756    flecainide (TAMBOCOR) tablet 100 mg  100 mg Oral Q12H Nikko Silver APRN   100 mg at 06/13/24 2049    lactated ringers infusion  100 mL/hr Intravenous Continuous Anny Chappell APRN 100 mL/hr at 06/13/24 0531 100 mL/hr at 06/13/24 0531    Magnesium Standard Dose Replacement - Follow Nurse / BPA Driven Protocol   Does not apply PRN Anny Chappell APRN        melatonin tablet 5 mg  5 mg Oral Nightly Rayshawn Mota APRN   5 mg at 06/13/24 2049    metoprolol tartrate (LOPRESSOR) tablet 100 mg  100 mg Oral Q12H Nikko Silver APRN   100 mg at 06/13/24 2049    nitroglycerin (NITROSTAT) SL tablet 0.4 mg  0.4 mg Sublingual Q5 Min PRN Anny Chappell APRN        ondansetron (ZOFRAN) injection 4 mg  4 mg Intravenous Q6H PRN Anny Chappell APRN        pantoprazole (PROTONIX) EC tablet 40 mg   40 mg Oral Q AM Anny Chappell APRN   40 mg at 06/14/24 0522    Phosphorus Replacement - Follow Nurse / BPA Driven Protocol   Does not apply PRN Anny Chappell APRN        Potassium Replacement - Follow Nurse / BPA Driven Protocol   Does not apply PRN Anny Chappell APRN        sodium chloride 0.9 % bolus 2,925 mL  30 mL/kg Intravenous Once Nicolas Meraz PA-C        sodium chloride 0.9 % flush 10 mL  10 mL Intravenous PRN Hiwot Abraham MD        sodium chloride 0.9 % flush 10 mL  10 mL Intravenous PRN Nicolas Meraz PA-C        sodium chloride 0.9 % flush 10 mL  10 mL Intravenous PRN Nicolas Meraz PA-C        sodium chloride 0.9 % flush 10 mL  10 mL Intravenous Q12H Anny Chappell APRN   10 mL at 06/13/24 2050    sodium chloride 0.9 % flush 10 mL  10 mL Intravenous PRN Anny Chappell APRN        sodium chloride 0.9 % infusion 40 mL  40 mL Intravenous PRN Anny Chappell APRN         Orders (last 24 hrs)        Start     Ordered    06/14/24 0600  CBC Auto Differential  PROCEDURE ONCE         06/13/24 2201    06/13/24 2000  Vital Signs Every Hour and Per Hospital Policy Based on Patient Condition  Every 4 Hours       06/13/24 1703    06/13/24 1129  Transfer Patient  Once         06/13/24 1129    06/13/24 0600  CBC & Differential  Daily       06/13/24 0123    06/13/24 0600  Basic Metabolic Panel  Daily       06/13/24 0123    06/12/24 2100  metoprolol tartrate (LOPRESSOR) tablet 100 mg  Every 12 Hours Scheduled         06/12/24 0955    06/12/24 1045  flecainide (TAMBOCOR) tablet 100 mg  Every 12 Hours Scheduled         06/12/24 0955    06/11/24 2245  melatonin tablet 5 mg  Nightly         06/11/24 2159    06/11/24 0600  Incentive Spirometry  Every 4 Hours While Awake       06/11/24 0534    06/11/24 0400  Chlorhexidine Gluconate Cloth 2 % pads 1 Application  Every 24 Hours,   Status:  Discontinued         06/10/24 0239    06/10/24 2100  atorvastatin (LIPITOR) tablet 40 mg  Nightly          "06/10/24 0241    06/10/24 0900  sennosides-docusate (PERICOLACE) 8.6-50 MG per tablet 2 tablet  2 Times Daily        Placed in \"And\" Linked Group    06/10/24 0239    06/10/24 0900  apixaban (ELIQUIS) tablet 5 mg  Every 12 Hours Scheduled         06/10/24 0241    06/10/24 0800  cefTRIAXone (ROCEPHIN) 2,000 mg in sodium chloride 0.9 % 100 mL MBP  Every 24 Hours         06/10/24 0719    06/10/24 0715  lactated ringers infusion  Continuous         06/10/24 0618    06/10/24 0600  pantoprazole (PROTONIX) EC tablet 40 mg  Every Early Morning         06/10/24 0343    06/10/24 0300  Vital Signs Every Hour and Per Hospital Policy Based on Patient Condition  Every Hour,   Status:  Canceled       06/10/24 0239    06/10/24 0300  Intake & Output  Every Hour       06/10/24 0239    06/10/24 0255  sodium chloride 0.9 % flush 10 mL  Every 12 Hours Scheduled         06/10/24 0239    06/10/24 0255  mupirocin (BACTROBAN) 2 % nasal ointment 1 Application  2 Times Daily,   Status:  Discontinued         06/10/24 0239    06/10/24 0240  Daily Weights  Daily       06/10/24 0239    06/10/24 0239  ondansetron (ZOFRAN) injection 4 mg  Every 6 Hours PRN         06/10/24 0239    06/10/24 0239  acetaminophen (TYLENOL) tablet 650 mg  Every 4 Hours PRN        Placed in \"Or\" Linked Group    06/10/24 0239    06/10/24 0239  acetaminophen (TYLENOL) suppository 650 mg  Every 4 Hours PRN        Placed in \"Or\" Linked Group    06/10/24 0239    06/10/24 0239  Potassium Replacement - Follow Nurse / BPA Driven Protocol  As Needed         06/10/24 0239    06/10/24 0239  Magnesium Standard Dose Replacement - Follow Nurse / BPA Driven Protocol  As Needed         06/10/24 0239    06/10/24 0239  Phosphorus Replacement - Follow Nurse / BPA Driven Protocol  As Needed         06/10/24 0239    06/10/24 0239  Calcium Replacement - Follow Nurse / BPA Driven Protocol  As Needed         06/10/24 0239    06/10/24 0238  Oral Care - Patient Not on NPPV & Not Intubated  Every " "Shift       06/10/24 0239    06/10/24 0237  sodium chloride 0.9 % infusion 40 mL  As Needed         06/10/24 0239    06/10/24 0237  polyethylene glycol (MIRALAX) packet 17 g  Daily PRN        Placed in \"And\" Linked Group    06/10/24 0239    06/10/24 0237  bisacodyl (DULCOLAX) EC tablet 5 mg  Daily PRN        Placed in \"And\" Linked Group    06/10/24 0239    06/10/24 0237  bisacodyl (DULCOLAX) suppository 10 mg  Daily PRN        Placed in \"And\" Linked Group    06/10/24 0239    06/10/24 0237  nitroglycerin (NITROSTAT) SL tablet 0.4 mg  Every 5 Minutes PRN         06/10/24 0239    06/10/24 0237  sodium chloride 0.9 % flush 10 mL  As Needed         06/10/24 0239    06/10/24 0138  sodium chloride 0.9 % bolus 2,925 mL  Once         06/10/24 0122    06/10/24 0039  dilTIAZem (CARDIZEM) 125 mg in 125 mL D5W infusion  Titrated,   Status:  Discontinued         06/10/24 0023    06/09/24 2225  sodium chloride 0.9 % flush 10 mL  As Needed        Placed in \"And\" Linked Group    06/09/24 2226 06/09/24 2124  sodium chloride 0.9 % flush 10 mL  As Needed        Placed in \"And\" Linked Group    06/09/24 2124 06/09/24 2110  sodium chloride 0.9 % flush 10 mL  As Needed         06/09/24 2112    Unscheduled  Oxygen Therapy- Nasal Cannula; Titrate 1-6 LPM Per SpO2; 90 - 95%  Continuous PRN       06/10/24 0239    --  apixaban (Eliquis) 5 MG tablet tablet  Every 12 Hours Scheduled         06/09/24 2150    --  atorvastatin (LIPITOR) 40 MG tablet  Nightly         06/09/24 2150    --  flecainide (TAMBOCOR) 100 MG tablet  2 Times Daily         06/09/24 2150    --  lisinopril (PRINIVIL,ZESTRIL) 10 MG tablet  Daily         06/09/24 2150    --  metoprolol tartrate (LOPRESSOR) 25 MG tablet  2 Times Daily         06/09/24 2150    --  coenzyme Q10 100 MG capsule  Daily         06/10/24 1558    --  glucosamine-chondroitin 500-400 MG capsule capsule  2 Times Daily With Meals         06/10/24 1558    --  Cholecalciferol 25 MCG (1000 UT) tablet  Daily  "        06/10/24 1558    --  ascorbic acid (VITAMIN C) 500 MG tablet  Daily         06/10/24 1558    --  Misc Natural Products (URINOZINC PROSTATE CLASSIC PO)  Daily         06/10/24 1558                     Physician Progress Notes (last 24 hours)        Nikko Silver APRN at 06/13/24 1100              [facility] Intensivist Services  INPATIENT PROGRESS NOTE    Patient Name: Rome Justice  Date of Admission: 6/9/2024  Today's Date: 06/13/24  Length of Stay: 3  Primary Care Physician: Provider, No Known    Subjective   Chief Complaint:   Abdominal Pain  Associated symptoms include vomiting.   Vomiting   Associated symptoms include abdominal pain.      At admission 6/10  Rome Justice is a 73 y.o. male who presented to the emergency department today with rigors, nausea, vomiting and back pain.  CT of the abdomen and pelvis was concerning for left lower lobe pneumonia.  Incidental finding on the CT of the abdomen and pelvis revealed bilateral fullness of the ureter collecting system.  Patient met sepsis criteria by evidence of leukocytosis, bandemia, elevated lactate, tachycardia and hypotension.  He was IV fluid resuscitated appropriately for which his blood pressure responded.  He was covered empirically with broad-spectrum antibiotics Zosyn and vancomycin.     During his ED course his heart rate increased.  Longstanding history of A-fib with a recent hospitalization at the end of January for rate control.  Rate greater than 170 on initial EKG.  After adequate IV fluid resuscitation he was placed on diltiazem drip after diltiazem bolus was ineffective.     The patient does not appear to be in any acute distress.  His O2 saturations were marginal therefore he was placed on 2 L of nasal cannula to maintain sats greater than 90%.  No oxygen requirements at baseline.    Interval History:  6/11  Acute hypoxic respiratory failure secondary to left lower lobe lobar pneumonia patient was  put on broad-spectrum antibiotics the pneumonia panel was asked for he is closely monitored. Atrial fibrillation possibly precipitated by the pneumonia patient is on diltiazem drip for rate control closely monitored in the ICU  White cell count still elevated but responding to treatmentI and personal management.  6/12  Started back on home flecainide, will increase metoprolol, continue to watch closely in ICU. WBC is responding to treatment.   6/13  Sitting up in bedside chair, reports that he feels exhausted but believes he is feeling better. On RA, no work of breathing. Nursing report no overnight events, or early morning issues.     Review of Systems   Gastrointestinal:  Positive for abdominal pain and vomiting.      All pertinent negatives and positives are as above. All other systems have been reviewed and are negative unless otherwise stated.     Objective    Temp:  [98.1 °F (36.7 °C)-98.6 °F (37 °C)] 98.1 °F (36.7 °C)  Heart Rate:  [59-88] 72  Resp:  [16-18] 16  BP: (103-147)/() 133/73  Physical Exam  Vitals and nursing note reviewed.   Constitutional:       Appearance: Normal appearance.   HENT:      Head: Normocephalic.      Nose: Nose normal.      Mouth/Throat:      Mouth: Mucous membranes are moist.      Pharynx: Oropharynx is clear.   Eyes:      Extraocular Movements: Extraocular movements intact.      Pupils: Pupils are equal, round, and reactive to light.   Cardiovascular:      Rate and Rhythm: Normal rate. Rhythm irregular.      Pulses: Normal pulses.      Heart sounds: Normal heart sounds.   Pulmonary:      Effort: Pulmonary effort is normal.      Breath sounds: Normal breath sounds.   Abdominal:      General: Abdomen is flat. Bowel sounds are normal.      Palpations: Abdomen is soft.   Musculoskeletal:         General: Normal range of motion.      Cervical back: Normal range of motion and neck supple.      Right lower leg: Edema present.      Left lower leg: Edema present.   Skin:     General:  Skin is warm and dry.      Capillary Refill: Capillary refill takes less than 2 seconds.   Neurological:      General: No focal deficit present.      Mental Status: He is alert and oriented to person, place, and time.   Psychiatric:         Mood and Affect: Mood normal.         Behavior: Behavior normal.         Thought Content: Thought content normal.         Judgment: Judgment normal.         Results Review:    Result Review:  I have personally reviewed the results from the time of this admission to 6/13/2024 16:07 CDT and agree with these findings:  [x]  Laboratory list / accordion  [x]  Microbiology  []  Radiology  []  EKG/Telemetry   [x]  Cardiology/Vascular   []  Pathology  []  Old records  []  Other:  Most notable findings include:       Culture Data:   Blood Culture   Date Value Ref Range Status   06/10/2024 No growth at 3 days  Preliminary   06/10/2024 No growth at 3 days  Preliminary     Respiratory Culture   Date Value Ref Range Status   06/10/2024   Final    Moderate growth (3+) Normal respiratory jailyn. No S. aureus or Pseudomonas aeruginosa detected. Final report.       I have reviewed the patient's current medications.     Assessment/Plan   Assessment  Active Hospital Problems    Diagnosis     **Sepsis     Pneumonia involving left lung     Chronic atrial fibrillation with rapid ventricular response     Abnormal CT of the abdomen     Acute respiratory failure with hypoxia    Mr. Jutsice is a 73-year-old male who presented to [facility] ER for abdominal plain believed to be renal stone as he has had prior history. CT of abdomen pelvis showed no renal calculi, further evaluated by urology and found to have possible pseudotumor, patient follows urologist closer to home in [facility], urologist advised that he could follow-up in the outpatient setting and signed off.   During ER evaluation he was also found to have left lobe pneumonia and was found to be septic, therefore was admitted to ICU for close  monitoring.  The sepsis pneumonia was complicated by chronic history of A-fib.  During hospital course he had A-fib RVR and spent couple of days on diltiazem drip, stopped 6/12/2024.  Home dose flecainide restarted, and increased home dose metoprolol.  WBC was slow to respond to treatment with initial WBC at admission 24K now 13K.   I have discussed with the patient that I feel he is doing well and is having no O2 requirements and rate is much more controlled therefore I will plan for transfer out of the unit today to the medical floor for further medical management.  He voiced understanding of this information was agreeable to this treatment plan.  He voiced happiness medical care provided.    Treatment Plan    Acute hypoxic respiratory failure  -Left lower lower lobe lobar pneumonia  -Completing azithromycin and Rocephin  -WBC initial 24,000, today 13,000  -No O2 requirements    Chronic A-fib  -Diltiazem drip stopped 6/12  -Restarted home flecainide.  Increased home dose metoprolol 100 mg twice daily.  -Rate is currently controlled    Abnormal renal CT scan  -Urology has seen and evaluated and signed off  -Patient will follow-up with his urologist in the outpatient setting      Medical Decision Making  Number and Complexity of problems: 3   Differential Diagnosis: PE, CVA, MI, sepsis    Conditions and Status        Condition is improving.     [facility] Data  External documents reviewed: EPIC  Cardiac tracing (EKG, telemetry) interpretation:   Radiology interpretation:   Labs reviewed:   Any tests that were considered but not ordered:      Decision rules/scores evaluated (example [id number], Wells, etc):      Discussed with: patient      Care Planning  Shared decision making: Patient, nursing team, consultants, intensivist Dr. Jarad GOMEZ  Code status and discussions: Full support      Disposition  Social Determinants of Health that impact treatment or disposition:   I expect the patient to be discharged to medical  today.     Electronically signed by XAVIER Eagle on 6/13/2024 at 1100.    Electronically signed by Nikko Silver APRN at 06/13/24 1633       Consult Notes (last 24 hours)  Notes from 06/13/24 0615 through 06/14/24 0615   No notes of this type exist for this encounter.

## 2024-06-14 NOTE — PLAN OF CARE
Problem: Adult Inpatient Plan of Care  Goal: Plan of Care Review  Outcome: Ongoing, Progressing  Goal: Patient-Specific Goal (Individualized)  Outcome: Ongoing, Progressing  Goal: Absence of Hospital-Acquired Illness or Injury  Outcome: Ongoing, Progressing  Intervention: Identify and Manage Fall Risk  Recent Flowsheet Documentation  Taken 6/14/2024 0200 by Madelyn Giraldo RN  Safety Promotion/Fall Prevention: safety round/check completed  Taken 6/14/2024 0000 by Madelyn Giraldo RN  Safety Promotion/Fall Prevention: safety round/check completed  Taken 6/13/2024 2200 by Madelyn Giraldo RN  Safety Promotion/Fall Prevention: safety round/check completed  Taken 6/13/2024 2100 by Madelyn Giraldo RN  Safety Promotion/Fall Prevention: safety round/check completed  Taken 6/13/2024 2000 by Madelyn Giraldo RN  Safety Promotion/Fall Prevention: safety round/check completed  Intervention: Prevent Skin Injury  Recent Flowsheet Documentation  Taken 6/13/2024 2000 by Madelyn iGraldo RN  Body Position: position changed independently  Intervention: Prevent and Manage VTE (Venous Thromboembolism) Risk  Recent Flowsheet Documentation  Taken 6/13/2024 2000 by Madelyn Giraldo RN  Activity Management:   up ad violetta   up in chair  VTE Prevention/Management: (see mar) other (see comments)  Range of Motion: active ROM (range of motion) encouraged  Intervention: Prevent Infection  Recent Flowsheet Documentation  Taken 6/13/2024 2000 by Madelyn Giraldo RN  Infection Prevention:   single patient room provided   rest/sleep promoted  Goal: Optimal Comfort and Wellbeing  Outcome: Ongoing, Progressing  Intervention: Provide Person-Centered Care  Recent Flowsheet Documentation  Taken 6/13/2024 2000 by Madelyn Giraldo RN  Trust Relationship/Rapport:   care explained   questions answered   questions encouraged  Goal: Readiness for Transition of Care  Outcome: Ongoing, Progressing     Problem: Adjustment to Illness (Sepsis/Septic Shock)  Goal: Optimal  Coping  Outcome: Ongoing, Progressing  Intervention: Optimize Psychosocial Adjustment to Illness  Recent Flowsheet Documentation  Taken 6/13/2024 2000 by Madelyn Giraldo RN  Family/Support System Care: support provided     Problem: Bleeding (Sepsis/Septic Shock)  Goal: Absence of Bleeding  Outcome: Ongoing, Progressing     Problem: Glycemic Control Impaired (Sepsis/Septic Shock)  Goal: Blood Glucose Level Within Desired Range  Outcome: Ongoing, Progressing     Problem: Infection Progression (Sepsis/Septic Shock)  Goal: Absence of Infection Signs and Symptoms  Outcome: Ongoing, Progressing  Intervention: Initiate Sepsis Management  Recent Flowsheet Documentation  Taken 6/13/2024 2000 by Madelyn Giraldo RN  Infection Prevention:   single patient room provided   rest/sleep promoted  Intervention: Promote Recovery  Recent Flowsheet Documentation  Taken 6/13/2024 2000 by Madelyn Giraldo RN  Activity Management:   up ad violetta   up in chair  Intervention: Promote Stabilization  Recent Flowsheet Documentation  Taken 6/13/2024 2000 by Madelyn Giraldo RN  Fluid/Electrolyte Management: fluids provided     Problem: Nutrition Impaired (Sepsis/Septic Shock)  Goal: Optimal Nutrition Intake  Outcome: Ongoing, Progressing     Problem: Asthma Comorbidity  Goal: Maintenance of Asthma Control  Outcome: Ongoing, Progressing  Intervention: Maintain Asthma Symptom Control  Recent Flowsheet Documentation  Taken 6/13/2024 2000 by Madelyn Giraldo RN  Medication Review/Management: medications reviewed     Problem: Behavioral Health Comorbidity  Goal: Maintenance of Behavioral Health Symptom Control  Outcome: Ongoing, Progressing  Intervention: Maintain Behavioral Health Symptom Control  Recent Flowsheet Documentation  Taken 6/13/2024 2000 by Madelyn Giraldo RN  Medication Review/Management: medications reviewed     Problem: COPD (Chronic Obstructive Pulmonary Disease) Comorbidity  Goal: Maintenance of COPD Symptom Control  Outcome: Ongoing,  Progressing  Intervention: Maintain COPD-Symptom Control  Recent Flowsheet Documentation  Taken 6/13/2024 2000 by Madelyn Giraldo RN  Medication Review/Management: medications reviewed     Problem: Diabetes Comorbidity  Goal: Blood Glucose Level Within Targeted Range  Outcome: Ongoing, Progressing     Problem: Heart Failure Comorbidity  Goal: Maintenance of Heart Failure Symptom Control  Outcome: Ongoing, Progressing  Intervention: Maintain Heart Failure-Management  Recent Flowsheet Documentation  Taken 6/13/2024 2000 by Madelyn Giraldo RN  Medication Review/Management: medications reviewed     Problem: Hypertension Comorbidity  Goal: Blood Pressure in Desired Range  Outcome: Ongoing, Progressing  Intervention: Maintain Blood Pressure Management  Recent Flowsheet Documentation  Taken 6/13/2024 2000 by Madelyn Giraldo RN  Medication Review/Management: medications reviewed     Problem: Obstructive Sleep Apnea Risk or Actual Comorbidity Management  Goal: Unobstructed Breathing During Sleep  Outcome: Ongoing, Progressing     Problem: Osteoarthritis Comorbidity  Goal: Maintenance of Osteoarthritis Symptom Control  Outcome: Ongoing, Progressing  Intervention: Maintain Osteoarthritis Symptom Control  Recent Flowsheet Documentation  Taken 6/13/2024 2000 by Madelyn Giraldo RN  Activity Management:   up ad violetta   up in chair  Medication Review/Management: medications reviewed     Problem: Pain Chronic (Persistent) (Comorbidity Management)  Goal: Acceptable Pain Control and Functional Ability  Outcome: Ongoing, Progressing  Intervention: Manage Persistent Pain  Recent Flowsheet Documentation  Taken 6/13/2024 2000 by Madelyn Giraldo RN  Medication Review/Management: medications reviewed  Intervention: Optimize Psychosocial Wellbeing  Recent Flowsheet Documentation  Taken 6/13/2024 2000 by Madelyn Giraldo RN  Diversional Activities:   television   smartphone  Family/Support System Care: support provided     Problem: Seizure Disorder  Comorbidity  Goal: Maintenance of Seizure Control  Outcome: Ongoing, Progressing     Problem: Fall Injury Risk  Goal: Absence of Fall and Fall-Related Injury  Outcome: Ongoing, Progressing  Intervention: Identify and Manage Contributors  Recent Flowsheet Documentation  Taken 6/13/2024 2000 by Madelyn Giraldo RN  Medication Review/Management: medications reviewed  Intervention: Promote Injury-Free Environment  Recent Flowsheet Documentation  Taken 6/14/2024 0200 by Madelyn Giraldo RN  Safety Promotion/Fall Prevention: safety round/check completed  Taken 6/14/2024 0000 by Madelyn Giraldo RN  Safety Promotion/Fall Prevention: safety round/check completed  Taken 6/13/2024 2200 by Madelyn Giraldo RN  Safety Promotion/Fall Prevention: safety round/check completed  Taken 6/13/2024 2100 by Madelyn Giraldo RN  Safety Promotion/Fall Prevention: safety round/check completed  Taken 6/13/2024 2000 by Madelyn Giraldo RN  Safety Promotion/Fall Prevention: safety round/check completed   Goal Outcome Evaluation:

## 2024-06-14 NOTE — PROGRESS NOTES
[facility] Medicine Services  INPATIENT PROGRESS NOTE    Patient Name: Rome Justice  Date of Admission: 6/9/2024  Today's Date: 06/14/24  Length of Stay: 4  Primary Care Physician: Provider, No Known    Subjective   Chief Complaint: Sepsis      HPI   The patient is a 73-year-old  male with a PMH significant for HTN, A-fib who presented to the ED on 6/9/2024 account of worsening nausea, vomiting with associated rigors and back pain CT of the abdomen/pelvis revealed left lower lobe pneumonia with bilateral fullness of the ureter collecting system and patient was found to be septic including elevated lactate/tachycardia/hypotension requiring IV fluid resuscitation started on broad-spectrum antibiotics IV vancomycin and Zosyn and admitted to the ICU and was full code.  While in the ED patient developed A-fib with a rate of 170 and was started on diltiazem drip after bolus and cardiology consulted.  And also had acute hypoxic respiratory failure requiring supplemental continue Permitabs.  On 6/12/2020 for details and drip was discontinued patient's home medications of flecainide and metoprolol were resumed and on 6/13/2024 patient was transferred to [facility] floor.    6/14  Patient seen and examined sitting in his recliner with no new complaints at this time but worried about increasing his weight with pedal edema and associated crackles on examination  Blood cultures showed no growth to date, Legionella negative, MRSA screen negative, respiratory cultures moderate growth of normal jailyn.  Completed 5 days of IV azithromycin will complete 7 days of IV ceftriaxone  Will start patient on IV diuresis with Lasix twice daily as a monitor serum electrolytes and renal function      Review of Systems   All pertinent negatives and positives are as above. All other systems have been reviewed and are negative unless otherwise stated.     Objective    Temp:  [97.7 °F (36.5 °C)-98.8 °F  (37.1 °C)] 98.2 °F (36.8 °C)  Heart Rate:  [64-72] 67  Resp:  [16-18] 18  BP: (120-143)/(63-81) 120/63  Physical Exam  Constitutional:       Appearance: Normal appearance.   HENT:      Head: Normocephalic.      Nose: Nose normal.      Mouth/Throat:      Mouth: Mucous membranes are moist.      Pharynx: Oropharynx is clear.   Eyes:      Extraocular Movements: Extraocular movements intact.      Pupils: Pupils are equal, round, and reactive to light.   Cardiovascular:      Rate and Rhythm: Normal rate. Rhythm irregular.      Pulses: Normal pulses.      Heart sounds: Normal heart sounds.   Pulmonary:      Effort: Pulmonary effort is normal.      Breath sounds: Normal breath sounds.  With bibasilar crackles  Abdominal:      General: Abdomen is flat. Bowel sounds are normal.      Palpations: Abdomen is soft.   Musculoskeletal:         General: Normal range of motion.      Cervical back: Normal range of motion and neck supple.      Right lower leg: Edema present.      Left lower leg: Edema present.   Skin:     General: Skin is warm and dry.      Capillary Refill: Capillary refill takes less than 2 seconds.   Neurological:      General: No focal deficit present.      Mental Status: He is alert and oriented to person, place, and time.   Psychiatric:         Mood and Affect: Mood normal.         Behavior: Behavior normal.         Thought Content: Thought content normal.         Judgment: Judgment normal.       Results Review:  I have reviewed the labs, radiology results, and diagnostic studies.    Laboratory Data:   Results from last 7 days   Lab Units 06/14/24  0340 06/13/24  0401 06/12/24  0156   WBC 10*3/mm3 12.89* 13.79* 15.47*   HEMOGLOBIN g/dL 11.6* 11.1* 11.8*   HEMATOCRIT % 35.5* 33.6* 36.1*   PLATELETS 10*3/mm3 299 257 246        Results from last 7 days   Lab Units 06/14/24  0340 06/13/24  0401 06/12/24  0156 06/10/24  0305 06/09/24  2246   SODIUM mmol/L 137 139 137   < > 134*   POTASSIUM mmol/L 3.9 4.4 4.2   < > 4.6    CHLORIDE mmol/L 102 106 104   < > 99   CO2 mmol/L 26.0 25.0 24.0   < > 23.0   BUN mg/dL 22 23 29*   < > 35*   CREATININE mg/dL 1.03 0.93 1.02   < > 1.40*   CALCIUM mg/dL 8.3* 8.2* 8.0*   < > 8.8   BILIRUBIN mg/dL  --   --   --   --  1.6*   ALK PHOS U/L  --   --   --   --  111   ALT (SGPT) U/L  --   --   --   --  14   AST (SGOT) U/L  --   --   --   --  19   GLUCOSE mg/dL 107* 116* 114*   < > 122*    < > = values in this interval not displayed.       Culture Data:   Blood Culture   Date Value Ref Range Status   06/10/2024 No growth at 4 days  Preliminary   06/10/2024 No growth at 4 days  Preliminary     Respiratory Culture   Date Value Ref Range Status   06/10/2024   Final    Moderate growth (3+) Normal respiratory jailyn. No S. aureus or Pseudomonas aeruginosa detected. Final report.       Radiology Data:   Imaging Results (Last 24 Hours)       ** No results found for the last 24 hours. **            I have reviewed the patient's current medications.     Assessment/Plan   Assessment  Active Hospital Problems    Diagnosis     **Sepsis     Pneumonia involving left lung     Chronic atrial fibrillation with rapid ventricular response     Abnormal CT of the abdomen     Acute respiratory failure with hypoxia        Treatment Plan  6/14  - Left lower lobe pneumonia with sepsis, MRSA screen negative/Legionella test negative/blood cultures showed no growth till date/respiratory panel negative.  Completed 5 days of IV azithromycin will complete 7 days of IV ceftriaxone, continue supplemental O2 with weaning parameters with continuous pulse oximetry  - Fluid overload Echo EF of 56 to 60% normal left ventricular diastolic function, mild to moderate pulmonary hypertension present.  Continue twice daily IV Lasix monitor serum electrolytes particularly potassium and Mg and replenish accordingly  - Chronic A-fib with RVR S/P Cardizem infusion.  Continue p.o. flecainide and p.o. metoprolol and p.o. Eliquis continue telemetry  monitoring.  Cardiology recommendations appreciated  - Acute respiratory failure with hypoxia continue supplemental O2 with weaning parameters and continuous pulse oximetry monitoring and he will evaluate for home O2  - Abnormal renal CT scan urology saw patient in the ICU recommendations appreciated and currently signed off with patient to follow-up on outpatient basis  Continue current medical management and supportive care    Medical Decision Making  Number and Complexity of problems: 3  Differential Diagnosis: As above    Conditions and Status        Condition is improving.     Medical Decision Making  Number and Complexity of problems: 3   Differential Diagnosis: PE, CVA, MI, sepsis     Conditions and Status        Condition is improving.     [facility] Data  External documents reviewed: EPIC  Cardiac tracing (EKG, telemetry) interpretation:   Radiology interpretation:   Labs reviewed:   Any tests that were considered but not ordered:      Decision rules/scores evaluated (example [id number], Wells, etc): Patient on Eliquis     Discussed with: patient      Care Planning  Shared decision making: Patient, nursing team, consultants, intensivist MD, Dr. Abraham  Code status and discussions: Full support          Disposition  Social Determinants of Health that impact treatment or disposition: Hopefully home  I expect the patient to be discharged to home in the next 2 to 3 days days.     Electronically signed by Pascual Bell MD, 06/14/24, 14:06 CDT.

## 2024-06-15 LAB
ANION GAP SERPL CALCULATED.3IONS-SCNC: 9 MMOL/L (ref 5–15)
BACTERIA SPEC AEROBE CULT: NORMAL
BACTERIA SPEC AEROBE CULT: NORMAL
BASOPHILS # BLD MANUAL: 0.11 10*3/MM3 (ref 0–0.2)
BASOPHILS NFR BLD MANUAL: 1 % (ref 0–1.5)
BUN SERPL-MCNC: 23 MG/DL (ref 8–23)
BUN/CREAT SERPL: 21.5 (ref 7–25)
CALCIUM SPEC-SCNC: 8.2 MG/DL (ref 8.6–10.5)
CHLORIDE SERPL-SCNC: 98 MMOL/L (ref 98–107)
CO2 SERPL-SCNC: 26 MMOL/L (ref 22–29)
CREAT SERPL-MCNC: 1.07 MG/DL (ref 0.76–1.27)
DEPRECATED RDW RBC AUTO: 45.4 FL (ref 37–54)
EGFRCR SERPLBLD CKD-EPI 2021: 74.2 ML/MIN/1.73
EOSINOPHIL # BLD MANUAL: 0.44 10*3/MM3 (ref 0–0.4)
EOSINOPHIL NFR BLD MANUAL: 4 % (ref 0.3–6.2)
ERYTHROCYTE [DISTWIDTH] IN BLOOD BY AUTOMATED COUNT: 13.8 % (ref 12.3–15.4)
GLUCOSE SERPL-MCNC: 120 MG/DL (ref 65–99)
HCT VFR BLD AUTO: 32.7 % (ref 37.5–51)
HGB BLD-MCNC: 11 G/DL (ref 13–17.7)
LYMPHOCYTES # BLD MANUAL: 0.33 10*3/MM3 (ref 0.7–3.1)
LYMPHOCYTES NFR BLD MANUAL: 6 % (ref 5–12)
MAGNESIUM SERPL-MCNC: 1.9 MG/DL (ref 1.6–2.4)
MCH RBC QN AUTO: 30.6 PG (ref 26.6–33)
MCHC RBC AUTO-ENTMCNC: 33.6 G/DL (ref 31.5–35.7)
MCV RBC AUTO: 91.1 FL (ref 79–97)
METAMYELOCYTES NFR BLD MANUAL: 1 % (ref 0–0)
MONOCYTES # BLD: 0.66 10*3/MM3 (ref 0.1–0.9)
NEUTROPHILS # BLD AUTO: 9.39 10*3/MM3 (ref 1.7–7)
NEUTROPHILS NFR BLD MANUAL: 84 % (ref 42.7–76)
NEUTS BAND NFR BLD MANUAL: 1 % (ref 0–5)
PLAT MORPH BLD: NORMAL
PLATELET # BLD AUTO: 323 10*3/MM3 (ref 140–450)
PMV BLD AUTO: 10.3 FL (ref 6–12)
POIKILOCYTOSIS BLD QL SMEAR: ABNORMAL
POLYCHROMASIA BLD QL SMEAR: ABNORMAL
POTASSIUM SERPL-SCNC: 3.8 MMOL/L (ref 3.5–5.2)
RBC # BLD AUTO: 3.59 10*6/MM3 (ref 4.14–5.8)
SODIUM SERPL-SCNC: 133 MMOL/L (ref 136–145)
VARIANT LYMPHS NFR BLD MANUAL: 3 % (ref 19.6–45.3)
WBC MORPH BLD: NORMAL
WBC NRBC COR # BLD AUTO: 11.05 10*3/MM3 (ref 3.4–10.8)

## 2024-06-15 PROCEDURE — 83735 ASSAY OF MAGNESIUM: CPT | Performed by: STUDENT IN AN ORGANIZED HEALTH CARE EDUCATION/TRAINING PROGRAM

## 2024-06-15 PROCEDURE — 25010000002 FUROSEMIDE PER 20 MG: Performed by: STUDENT IN AN ORGANIZED HEALTH CARE EDUCATION/TRAINING PROGRAM

## 2024-06-15 PROCEDURE — 85007 BL SMEAR W/DIFF WBC COUNT: CPT | Performed by: STUDENT IN AN ORGANIZED HEALTH CARE EDUCATION/TRAINING PROGRAM

## 2024-06-15 PROCEDURE — 80048 BASIC METABOLIC PNL TOTAL CA: CPT | Performed by: STUDENT IN AN ORGANIZED HEALTH CARE EDUCATION/TRAINING PROGRAM

## 2024-06-15 PROCEDURE — 85025 COMPLETE CBC W/AUTO DIFF WBC: CPT | Performed by: STUDENT IN AN ORGANIZED HEALTH CARE EDUCATION/TRAINING PROGRAM

## 2024-06-15 RX ORDER — SODIUM CHLORIDE 1 G/1
1 TABLET ORAL
Status: COMPLETED | OUTPATIENT
Start: 2024-06-15 | End: 2024-06-16

## 2024-06-15 RX ADMIN — Medication 5 MG: at 21:39

## 2024-06-15 RX ADMIN — FLECAINIDE ACETATE 100 MG: 100 TABLET ORAL at 09:06

## 2024-06-15 RX ADMIN — FUROSEMIDE 40 MG: 10 INJECTION, SOLUTION INTRAMUSCULAR; INTRAVENOUS at 17:30

## 2024-06-15 RX ADMIN — METOPROLOL TARTRATE 100 MG: 50 TABLET, FILM COATED ORAL at 21:39

## 2024-06-15 RX ADMIN — SODIUM CHLORIDE TAB 1 GM 1 G: 1 TAB at 17:30

## 2024-06-15 RX ADMIN — ATORVASTATIN CALCIUM 40 MG: 40 TABLET ORAL at 21:39

## 2024-06-15 RX ADMIN — Medication 10 ML: at 21:40

## 2024-06-15 RX ADMIN — Medication 10 ML: at 09:08

## 2024-06-15 RX ADMIN — Medication 5 MG/HR: at 00:24

## 2024-06-15 RX ADMIN — FUROSEMIDE 40 MG: 10 INJECTION, SOLUTION INTRAMUSCULAR; INTRAVENOUS at 09:07

## 2024-06-15 RX ADMIN — APIXABAN 5 MG: 5 TABLET, FILM COATED ORAL at 09:06

## 2024-06-15 RX ADMIN — FLECAINIDE ACETATE 100 MG: 100 TABLET ORAL at 21:39

## 2024-06-15 RX ADMIN — METOPROLOL TARTRATE 100 MG: 50 TABLET, FILM COATED ORAL at 09:06

## 2024-06-15 RX ADMIN — APIXABAN 5 MG: 5 TABLET, FILM COATED ORAL at 21:39

## 2024-06-15 RX ADMIN — PANTOPRAZOLE SODIUM 40 MG: 40 TABLET, DELAYED RELEASE ORAL at 06:02

## 2024-06-15 NOTE — PROGRESS NOTES
[facility] Medicine Services  INPATIENT PROGRESS NOTE    Patient Name: Rome Justice  Date of Admission: 6/9/2024  Today's Date: 06/15/24  Length of Stay: 5  Primary Care Physician: Provider, No Known    Subjective   Chief Complaint: Sepsis      HPI   73-year-old man on day 5 of hospitalization  This is my first day of encounter with patient  He presented with concern for sepsis patient.  Patient reportedly has left lower lobe pneumonia.  In addition to this he was 6 A-fib rate of 170  He was admitted in the critical care unit bed from Ramya 10 to June 13.  Hospitalist service started on June 14.  Patient respiratory culture showed moderate growth of normal jailyn.  Blood culture showed no growth to date  Patient completed 5 days of IV azithromycin with plan for completion of 7 days of IV ceftriaxone.  Patient was also started on diuresis with plan for monitoring electrolytes and renal function.    Patient is hemodynamically stable but has lower blood pressure reading in the 90s  Earlier today he has had heart rate as high as 197.  Most recent heart rate is 61    Review of Systems   All pertinent negatives and positives are as above. All other systems have been reviewed and are negative unless otherwise stated.     Objective    Temp:  [98.1 °F (36.7 °C)-98.9 °F (37.2 °C)] 98.1 °F (36.7 °C)  Heart Rate:  [] 61  Resp:  [18] 18  BP: ()/(57-80) 99/64  Physical Exam  Constitutional:       Appearance: Normal appearance.   HENT:      Head: Normocephalic.      Nose: Nose normal.      Mouth/Throat:      Mouth: Mucous membranes are moist.      Pharynx: Oropharynx is clear.   Eyes:      Extraocular Movements: Extraocular movements intact.      Pupils: Pupils are equal, round, and reactive to light.   Cardiovascular:      Rate and Rhythm: Normal rate. Rhythm irregular.      Pulses: Normal pulses.      Heart sounds: Normal heart sounds.   Pulmonary:      Effort: Pulmonary effort is  normal.      Breath sounds: Normal breath sounds.  With bibasilar crackles  Abdominal:      General: Abdomen is flat. Bowel sounds are normal.      Palpations: Abdomen is soft.   Musculoskeletal:         General: Normal range of motion.      Cervical back: Normal range of motion and neck supple.      Right lower leg: Edema present.      Left lower leg: Edema present.   Skin:     General: Skin is warm and dry.      Capillary Refill: Capillary refill takes less than 2 seconds.   Neurological:      General: No focal deficit present.      Mental Status: He is alert and oriented to person, place, and time.   Psychiatric:         Mood and Affect: Mood normal.         Behavior: Behavior normal.         Thought Content: Thought content normal.         Judgment: Judgment normal.       Results Review:  I have reviewed the labs, radiology results, and diagnostic studies.    Laboratory Data:   Results from last 7 days   Lab Units 06/15/24  0439 06/14/24  0340 06/13/24  0401   WBC 10*3/mm3 11.05* 12.89* 13.79*   HEMOGLOBIN g/dL 11.0* 11.6* 11.1*   HEMATOCRIT % 32.7* 35.5* 33.6*   PLATELETS 10*3/mm3 323 299 257        Results from last 7 days   Lab Units 06/15/24  0439 06/14/24  0340 06/13/24  0401 06/10/24  0305 06/09/24  2246   SODIUM mmol/L 133* 137 139   < > 134*   POTASSIUM mmol/L 3.8 3.9 4.4   < > 4.6   CHLORIDE mmol/L 98 102 106   < > 99   CO2 mmol/L 26.0 26.0 25.0   < > 23.0   BUN mg/dL 23 22 23   < > 35*   CREATININE mg/dL 1.07 1.03 0.93   < > 1.40*   CALCIUM mg/dL 8.2* 8.3* 8.2*   < > 8.8   BILIRUBIN mg/dL  --   --   --   --  1.6*   ALK PHOS U/L  --   --   --   --  111   ALT (SGPT) U/L  --   --   --   --  14   AST (SGOT) U/L  --   --   --   --  19   GLUCOSE mg/dL 120* 107* 116*   < > 122*    < > = values in this interval not displayed.       Culture Data:   Blood Culture   Date Value Ref Range Status   06/10/2024 No growth at 4 days  Preliminary   06/10/2024 No growth at 4 days  Preliminary     Respiratory Culture    Date Value Ref Range Status   06/10/2024   Final    Moderate growth (3+) Normal respiratory jailyn. No S. aureus or Pseudomonas aeruginosa detected. Final report.       Radiology Data:   Imaging Results (Last 24 Hours)       ** No results found for the last 24 hours. **            I have reviewed the patient's current medications.     Assessment/Plan   Assessment  Active Hospital Problems    Diagnosis     **Sepsis     Pneumonia involving left lung     Chronic atrial fibrillation with rapid ventricular response     Abnormal CT of the abdomen     Acute respiratory failure with hypoxia              Medical Decision Making  Number and Complexity of problems:   Problem list  Sepsis from left lower lobe pneumonia status post 5 days of azithromycin.  As per hospitalists prior to me, on the phone he had said was for 7 days of ceftriaxone.  I noticed that he no longer on ceftriaxone.  Cultures have been unrevealing.  A shorter course may be warranted.  Will monitor off antibiotic.    Atrial fibrillation with rapid ventricular response.  He is back to normal sinus rhythm.  Patient is on apixaban, Lopressor 9.  Echocardiogram is reviewed below normal EF.  Right atrial cavity mildly to moderately dilated; mild to moderate pulmonary hypertension described.    Mild to moderate pulmonary hypertension described on echocardiogram-had been on Lasix twice daily through IV.      Fluid overload-been placed on diuretic.  He had good response      Hyponatremia probably related to diuretic.  As per record by Nadeem El dated 6 respiratory failure secondary to left lower lobe pneumonia.  Per record by Dr. Abraham dated June 11, patient was placed on 2 L nasal cannula to maintain sats greater than 90.  Patient has no oxygen requirements at baseline.  A progress note indicates SpO2 of 96 I did not see any blood gas during this hospitalization      Treatment Plan    Diuretics  Add salt tablets  Continue beta-blocker plus flecainide along  with    Medications reviewed:      apixaban, 5 mg, Oral, Q12H  atorvastatin, 40 mg, Oral, Nightly  flecainide, 100 mg, Oral, Q12H  furosemide, 40 mg, Intravenous, BID With Meals  melatonin, 5 mg, Oral, Nightly  metoprolol tartrate, 100 mg, Oral, Q12H  pantoprazole, 40 mg, Oral, Q AM  senna-docusate sodium, 2 tablet, Oral, BID  sodium chloride, 10 mL, Intravenous, Q12H    Conditions and Status        Condition is improving.     Medical Decision Making  Number and Complexity of problems: 3   Differential Diagnosis: PE, CVA, MI, sepsis     Conditions and Status        Condition is improving.     MDM Data  External documents reviewed: EPIC  Cardiac tracing (EKG, telemetry) interpretation:   Results for orders placed during the hospital encounter of 06/09/24    Adult Transthoracic Echo Complete W/ Cont if Necessary Per Protocol    Interpretation Summary    Left ventricular ejection fraction appears to be 56 - 60%.    Left ventricular diastolic function was normal.    The right ventricular cavity is mild to moderately dilated.    The right atrial cavity is mild to moderately  dilated.    Estimated right ventricular systolic pressure from tricuspid regurgitation is mildly elevated (35-45 mmHg).    Mild to moderate pulmonary hypertension is present.    Moderate pulmonic valve regurgitation is present.      Radiology interpretation:   Labs reviewed:   Any tests that were considered but not ordered:      Decision rules/scores evaluated (example [id number], Wells, etc): Patient on Eliquis     Discussed with: patient      Care Planning  Shared decision making: Patient, nursing team    Code status and discussions: Full support     I confirmed that the patient's advanced care plan is present, code status is documented, and a surrogate decision maker is listed in the patient's medical record.    Disposition  Social Determinants of Health that impact treatment or disposition: Hopefully home  I expect the patient to be discharged  to home in the next 2 to 3 days days.   The original plan by hospitalist before me was to continue antibiotic for 7 days (ceftriaxone)  I noticed that he only had 5 days.  There is no active order for it right now.  I will take this opportunity to monitor him off antibiotic.  He also had some issue with rapid ventricular response (A-fib earlier on)  He is no longer on Cardizem drip and he is back to normal sinus rhythm with rate of 72.  It is known to him that he has A-fib.  If heart rate remains controlled tomorrow, anticipate that he can be released from the hospital  He is no longer requiring oxygen.  Will continue him on diuresis but I will give salt tablet x 1 to 2 tablets        Electronically signed by Bertram Ware MD, 06/15/24, 15:05 CDT.

## 2024-06-15 NOTE — PLAN OF CARE
Goal Outcome Evaluation:  Plan of Care Reviewed With: patient        Progress: improving  Outcome Evaluation: Pt A&Ox4. Room air. No crackles noted in lungs upon assessment. HR has been normal sinus rhythm ranging from 58-75 bpm this shift. FPVC was noted. No HTN noted this shift. Pt washed his body with some assistance from me. He did not have any shortness of breath while washing. No pain noted.

## 2024-06-15 NOTE — NURSING NOTE
Telemetry notified of conversion from SR to A-fib. Heart rate currently fluxing between 141 and 165. Patient is not symptomatic at this time. Notified provider--see MAR/orders.

## 2024-06-15 NOTE — PROGRESS NOTES
Patient's nurse reported A-fib with RVR, patient is a known case of A-fib.  Patient was given 5 mg of intravenous metoprolol, but to no avail.  Started patient on Cardizem drip [plan is to monitor on drip and if no improvement will consult ICU].

## 2024-06-15 NOTE — PLAN OF CARE
Goal Outcome Evaluation:         Patient A/O x4 this shift. Patient had no complaints of pain. Patient up frequently post IV lasix per MAR/I/O's. Patient flipped into A-fib RVR -201--see previous nursing notes/documentation. Provider notified, medications given, vitals obtained with close monitoring. Patient converted back to SR heart rate 60's-70's with increase in PVCs/PACs and PVC couplets. Per telemetry, patient ran SR 64-86 and when in a-fib, 147-224 at its highest. Patient was on cardizem gtt to a max of 7.5 mg/hr until he converted back into SR. Patient was not symptomatic (sensation of palpitations) until his heart rate jumped up to the 170's. Personal items and call button in reach. No needs in the room overnight. Will update oncoming dayshift nurse at bedside report in the a.m. as appropriate.

## 2024-06-16 ENCOUNTER — READMISSION MANAGEMENT (OUTPATIENT)
Dept: CALL CENTER | Facility: HOSPITAL | Age: 74
End: 2024-06-16
Payer: MEDICARE

## 2024-06-16 VITALS
HEART RATE: 58 BPM | SYSTOLIC BLOOD PRESSURE: 131 MMHG | BODY MASS INDEX: 30.77 KG/M2 | HEIGHT: 71 IN | TEMPERATURE: 97.2 F | OXYGEN SATURATION: 97 % | DIASTOLIC BLOOD PRESSURE: 75 MMHG | RESPIRATION RATE: 18 BRPM | WEIGHT: 219.8 LBS

## 2024-06-16 LAB
ANION GAP SERPL CALCULATED.3IONS-SCNC: 9 MMOL/L (ref 5–15)
BASOPHILS # BLD AUTO: 0.06 10*3/MM3 (ref 0–0.2)
BASOPHILS NFR BLD AUTO: 0.7 % (ref 0–1.5)
BUN SERPL-MCNC: 27 MG/DL (ref 8–23)
BUN/CREAT SERPL: 23.7 (ref 7–25)
CALCIUM SPEC-SCNC: 8.2 MG/DL (ref 8.6–10.5)
CHLORIDE SERPL-SCNC: 98 MMOL/L (ref 98–107)
CO2 SERPL-SCNC: 29 MMOL/L (ref 22–29)
CREAT SERPL-MCNC: 1.14 MG/DL (ref 0.76–1.27)
DEPRECATED RDW RBC AUTO: 46.6 FL (ref 37–54)
EGFRCR SERPLBLD CKD-EPI 2021: 68.8 ML/MIN/1.73
EOSINOPHIL # BLD AUTO: 0.5 10*3/MM3 (ref 0–0.4)
EOSINOPHIL NFR BLD AUTO: 5.7 % (ref 0.3–6.2)
ERYTHROCYTE [DISTWIDTH] IN BLOOD BY AUTOMATED COUNT: 13.8 % (ref 12.3–15.4)
GLUCOSE SERPL-MCNC: 110 MG/DL (ref 65–99)
HCT VFR BLD AUTO: 34.7 % (ref 37.5–51)
HGB BLD-MCNC: 11.4 G/DL (ref 13–17.7)
IMM GRANULOCYTES # BLD AUTO: 0.37 10*3/MM3 (ref 0–0.05)
IMM GRANULOCYTES NFR BLD AUTO: 4.2 % (ref 0–0.5)
LYMPHOCYTES # BLD AUTO: 1 10*3/MM3 (ref 0.7–3.1)
LYMPHOCYTES NFR BLD AUTO: 11.4 % (ref 19.6–45.3)
MCH RBC QN AUTO: 30.1 PG (ref 26.6–33)
MCHC RBC AUTO-ENTMCNC: 32.9 G/DL (ref 31.5–35.7)
MCV RBC AUTO: 91.6 FL (ref 79–97)
MONOCYTES # BLD AUTO: 0.74 10*3/MM3 (ref 0.1–0.9)
MONOCYTES NFR BLD AUTO: 8.4 % (ref 5–12)
NEUTROPHILS NFR BLD AUTO: 6.1 10*3/MM3 (ref 1.7–7)
NEUTROPHILS NFR BLD AUTO: 69.6 % (ref 42.7–76)
NRBC BLD AUTO-RTO: 0 /100 WBC (ref 0–0.2)
PLATELET # BLD AUTO: 357 10*3/MM3 (ref 140–450)
PMV BLD AUTO: 10.3 FL (ref 6–12)
POTASSIUM SERPL-SCNC: 3.8 MMOL/L (ref 3.5–5.2)
RBC # BLD AUTO: 3.79 10*6/MM3 (ref 4.14–5.8)
SODIUM SERPL-SCNC: 136 MMOL/L (ref 136–145)
WBC NRBC COR # BLD AUTO: 8.77 10*3/MM3 (ref 3.4–10.8)

## 2024-06-16 PROCEDURE — 85025 COMPLETE CBC W/AUTO DIFF WBC: CPT | Performed by: STUDENT IN AN ORGANIZED HEALTH CARE EDUCATION/TRAINING PROGRAM

## 2024-06-16 PROCEDURE — 25010000002 FUROSEMIDE PER 20 MG: Performed by: STUDENT IN AN ORGANIZED HEALTH CARE EDUCATION/TRAINING PROGRAM

## 2024-06-16 PROCEDURE — 80048 BASIC METABOLIC PNL TOTAL CA: CPT | Performed by: STUDENT IN AN ORGANIZED HEALTH CARE EDUCATION/TRAINING PROGRAM

## 2024-06-16 RX ORDER — FUROSEMIDE 40 MG/1
40 TABLET ORAL DAILY
Qty: 15 TABLET | Refills: 0 | Status: SHIPPED | OUTPATIENT
Start: 2024-06-16 | End: 2024-07-02

## 2024-06-16 RX ORDER — METOPROLOL TARTRATE 100 MG/1
100 TABLET ORAL EVERY 12 HOURS SCHEDULED
Qty: 6 TABLET | Refills: 0 | Status: SHIPPED | OUTPATIENT
Start: 2024-06-16 | End: 2024-06-20

## 2024-06-16 RX ADMIN — METOPROLOL TARTRATE 100 MG: 50 TABLET, FILM COATED ORAL at 08:33

## 2024-06-16 RX ADMIN — SODIUM CHLORIDE TAB 1 GM 1 G: 1 TAB at 08:35

## 2024-06-16 RX ADMIN — FUROSEMIDE 40 MG: 10 INJECTION, SOLUTION INTRAMUSCULAR; INTRAVENOUS at 08:32

## 2024-06-16 RX ADMIN — Medication 10 ML: at 08:33

## 2024-06-16 RX ADMIN — PANTOPRAZOLE SODIUM 40 MG: 40 TABLET, DELAYED RELEASE ORAL at 05:50

## 2024-06-16 RX ADMIN — APIXABAN 5 MG: 5 TABLET, FILM COATED ORAL at 08:32

## 2024-06-16 RX ADMIN — FLECAINIDE ACETATE 100 MG: 100 TABLET ORAL at 08:32

## 2024-06-16 NOTE — OUTREACH NOTE
Prep Survey      Flowsheet Row Responses   Alevism facility patient discharged from? [place]   Is LACE score < 7 ? No   Eligibility Readm Mgmt   Discharge diagnosis Sepsis   Does the patient have one of the following disease processes/diagnoses(primary or secondary)? Sepsis   Does the patient have Home health ordered? No   Is there a DME ordered? No   Prep survey completed? Yes            Danielle WOOD - Registered Nurse

## 2024-06-16 NOTE — PAYOR COMM NOTE
"DE HOME 6-16-24  [id number]    Rome Weinberg (73 y.o. Male)       Date of Birth   1950    Social Security Number       Address   [address] [place] [address]    Home Phone   [phone]    MRN   [id number]       Latter day   Other    Marital Status                               Admission Date   6/9/24    Admission Type   Emergency    Admitting Provider   Bertram Ware MD    Attending Provider       Department, Room/Bed   [facility] 4B, 446/1       Discharge Date   6/16/2024    Discharge Disposition   Home or Self Care    Discharge Destination                                 Attending Provider: (none)   Allergies: No Known Allergies    Isolation: None   Infection: None   Code Status: Prior    Ht: 180.3 cm (71\")   Wt: 99.7 kg (219 lb 12.8 oz)    Admission Cmt: None   Principal Problem: Sepsis [A41.9]                   Active Insurance as of 6/9/2024       Primary Coverage       Payor Plan Insurance Group Employer/Plan Group    ANTHEM MEDICARE REPLACEMENT ANTHEM MEDICARE ADVANTAGE [id number]       Payor Plan Address Payor Plan Phone Number Payor Plan Fax Number Effective Dates    [address] [phone]  1/1/2024 - None Entered    [facility] [id number]         Subscriber Name Subscriber Birth Date Member ID       ROME WEINBERG 1950 [id number]               Secondary Coverage       Payor Plan Insurance Group Employer/Plan Group    [id number]        Payor Plan Address Payor Plan Phone Number Payor Plan Fax Number Effective Dates    [address]   6/9/2024 - None Entered    [facility] [id number]         Subscriber Name Subscriber Birth Date Member ID       ROME WEINBERG 1950 [id number]                     Emergency Contacts        (Rel.) Home Phone Work Phone Mobile Phone    KRISTYN FARMER (Sister) -- -- [phone]                 Discharge Summary        Bertram Ware MD at 06/16/24 1150        " "        [facility] Medicine Services  DISCHARGE SUMMARY       Date of Admission: 6/9/2024  Date of Discharge:  6/16/2024  Primary Care Physician: Provider, No Known    Presenting Problem/History of Present Illness:  \"Sepsis secondary to left-sided pneumonia\"  Final Discharge Diagnoses:  Active Hospital Problems    Diagnosis     **Sepsis     Pneumonia involving left lung     Chronic atrial fibrillation with rapid ventricular response     Abnormal CT of the abdomen     Acute respiratory failure with hypoxia    In addition to above mild to moderate pulmonary hypertension as described in echocardiogram  Fluid retention of lower extremities.  Hyponatremia probably related to diuretic effect versus fluid retention.    Consults:   Urologist  Intensivist admitted the patient and was transition to the hospitalist service  Procedures Performed: None    Pertinent Test Results:   Results for orders placed during the hospital encounter of 06/09/24    Adult Transthoracic Echo Complete W/ Cont if Necessary Per Protocol    Interpretation Summary    Left ventricular ejection fraction appears to be 56 - 60%.    Left ventricular diastolic function was normal.    The right ventricular cavity is mild to moderately dilated.    The right atrial cavity is mild to moderately  dilated.    Estimated right ventricular systolic pressure from tricuspid regurgitation is mildly elevated (35-45 mmHg).    Mild to moderate pulmonary hypertension is present.    Moderate pulmonic valve regurgitation is present.      Imaging Results (All)       Procedure Component Value Units Date/Time    XR Chest 1 View [[id number]] Collected: 06/11/24 1031     Updated: 06/11/24 1037    Narrative:      EXAMINATION: XR CHEST 1 VW- 6/11/2024 10:31 AM     HISTORY: pna; I48.91-Unspecified atrial fibrillation; A41.9-Sepsis,  unspecified organism; N17.9-Acute kidney failure, unspecified;  J18.9-Pneumonia, unspecified organism; " R09.02-Hypoxemia.     REPORT: A frontal view of the chest was obtained.     COMPARISON: CT chest without contrast Ramya 10, 2024.     There is extensive consolidation of the left lung, greatest at the lung  base with obscuration of the left mid diaphragm. There are air  bronchograms in the perihilar region. There is mild atelectasis in the  right lung base. Left heart margin is mostly obscured, there is no  obvious cardiomegaly or evidence of CHF. No pneumothorax is identified.  Underlying left pleural effusion cannot be excluded, though none is seen  on the CT.. The osseous structures are unremarkable.       Impression:      Extensive consolidating left-sided pneumonia.           This report was signed and finalized on 6/11/2024 10:33 AM by Dr. Scott Mitchell MD.       CT Abdomen Pelvis With Contrast [[id number]] Collected: 06/10/24 0607     Updated: 06/10/24 0816    Narrative:      EXAMINATION: CT ABDOMEN PELVIS W CONTRAST-      6/9/2024 10:15 PM     HISTORY: LLQ, left flank, NV; I48.91-Unspecified atrial fibrillation     In order to have a CT radiation dose as low as reasonably achievable  Automated Exposure Control was utilized for adjustment of the mA and/or  KV according to patient size.     Total DLP = 416.95 mGy.cm     The CT scan of the abdomen and pelvis is performed after intravenous  contrast enhancement.     Images are acquired in the axial plane and subsequent reconstruction in  coronal and sagittal planes.     There is no previous similar study for comparison.     The chest is separately reviewed and reported.     There is fatty infiltration of the liver. No focal intrinsic  abnormality. The spleen is normal with multiple small granulomas.     No radiopaque gallstones.     Pancreas is normal.     The adrenal glands bilaterally are normal.     There is moderate lobulation of renal contour bilaterally. There is mild  irregularity and focal bulge along the mid right lateral kidney which  may represent a  hypertrophied column of Solo or asymmetrical cortical  hypertrophy?. It measures approximately 1.8 cm in diameter. No  radiopaque calculi. There is mild ectasia of the collecting system  bilaterally involving the proximal ureters on both sides. There is  surrounding retroperitoneal fat infiltration. No calculi in either  ureter. Urinary bladder is poorly distended. No intrinsic abnormality.     Prostate is not enlarged. There is intrinsic calcification. There are  small metallic objects in the prostate with streak artifact. This may  represent radiotherapy seeds.     There is a small fat-containing umbilical hernia.     There is small hiatal hernia. Stomach and duodenum are unremarkable.  Small bowel is nondistended. Appendix is not visualized. No finding to  suggest appendicitis. Moderate gas and stool is seen in the colon. There  is diverticulosis of the colon. No evidence of diverticulitis.     Atheromatous change of the abdominal aorta and iliac arteries. No  aneurysmal dilatation.     There is no evidence of abdominal or pelvic lymphadenopathy.     Images reviewed in bone window show chronic degenerative changes of the  lumbar spine with mild dextroscoliosis. Small focus of bony sclerosis  involving the right iliac bone adjacent to the right sacroiliac joint  and similar finding in the adjacent sacrum may represent bone islands?.  Possibility of metastatic disease is not excluded.       Impression:      1. A mild symmetrical ectasia of the renal collecting system bilaterally  may represent a normal variation, chronic pyelonephritis or  vesicoureteral reflux disease?. No calculi. No finding to suggest  obstruction. This may be clinically correlated and further evaluated.  2. An apparent irregularity of the right renal parenchyma/cortex in the  midpole may represent a pseudotumor?. However possibility of a  neoplastic process may not be excluded. A follow-up contrast enhanced CT  or MR imaging of the abdomen  with renal mass protocol may be obtained.  3. Other nonacute findings as above.  4. The chest is separately reviewed and reported.     The above study was initially reviewed and reported by [facility]. The  above-mentioned discrepancy was reported to ER physician at 8:13 a.m.                                   This report was signed and finalized on 6/10/2024 8:13 AM by Dr. Muriel Dixon MD.       CT Chest Without Contrast Diagnostic [[id number]] Collected: 06/10/24 0600     Updated: 06/10/24 0723    Narrative:      EXAMINATION: CT CHEST WO CONTRAST DIAGNOSTIC-      6/10/2024 1:15 AM     HISTORY: left pna; I48.91-Unspecified atrial fibrillation; A41.9-Sepsis,  unspecified organism; N17.9-Acute kidney failure, unspecified;  J18.9-Pneumonia, unspecified organism; R09.02-Hypoxemia     In order to have a CT radiation dose as low as reasonably achievable  Automated Exposure Control was utilized for adjustment of the mA and/or  KV according to patient size.     Total DLP = 221.38 mGy.cm     The CT scan of the chest is performed without intravenous contrast  enhancement.     Images are acquired in the axial plane and subsequent reconstruction in  coronal and sagittal planes.     There is no previous similar study for comparison.     There are areas of consolidation with air bronchograms involving the  left upper lobe posteriorly and the left lower lobe.     A smaller infiltrate is seen in the right parahilar area and right lower  lobe.     There is a small bibasilar pleural effusion.     There are atelectatic changes in the right middle lobe.     The Central airway is patent. No endobronchial abnormality is noted.     There are a few calcified granulomas in the lungs bilaterally.     Limited visualized soft tissue of the neck are unremarkable.     The thyroid gland is suboptimally visualized and not well evaluated.  There is a probable nodule in the left lobe.     There is no axillary lymphadenopathy.     Atheromatous  change of thoracic aorta noted. No aneurysmal dilatation.     Moderate ectasia of the central pulmonary arteries represent pulmonary  arterial hypertension.     There are a few borderline prominent mediastinal lymph nodes. The  largest lymph node in the left hilum, level 5, measures 9 mm in short  axis. There are calcified granulomas in the mediastinum and right hilum.  The hilar lymph nodes are not well visualized or evaluated in this study  due to lack of contrast enhancement.     Atheromatous change of coronary arteries are noted.     There is moderate cardiomegaly.     There is a small hiatal hernia.     The limited visualized unenhanced abdomen is unremarkable and there is a  residual contrast in the renal collecting system bilaterally from a  previous contrast enhanced CT scan of the abdomen and pelvis.     Images reviewed in bone windows show chronic degenerative changes of the  thoracic spine. No acute bony abnormality.       Impression:      1. Bilateral lung infiltrate, significantly more extensive on the left,  represent an acute inflammatory/infectious process.  2. Small bibasilar pleural effusion.  3. Nonspecific borderline mediastinal lymphadenopathy probably represent  reactive adenopathy to the infiltrate.     The above study was initially reviewed and reported by [facility]. I do not  find any discrepancies.                                                        This report was signed and finalized on 6/10/2024 7:20 AM by Dr. Muriel Dixon MD.             LAB RESULTS:      Lab 06/16/24  0408 06/15/24  0439 06/14/24  0340 06/13/24  0401 06/12/24  0156 06/11/24  0321 06/10/24  0305 06/10/24  0305 06/10/24  0114 06/09/24  2246 06/09/24  2148   WBC 8.77 11.05* 12.89* 13.79* 15.47* 17.99*  --  19.60*  --   --  24.91*   HEMOGLOBIN 11.4* 11.0* 11.6* 11.1* 11.8* 12.2*  --  12.2*  --   --  13.1   HEMATOCRIT 34.7* 32.7* 35.5* 33.6* 36.1* 36.9*  --  37.1*  --   --  39.5   PLATELETS 357 323 299 257 138 056   --  175  --   --  200   NEUTROS ABS 6.10 9.39* 9.70* 11.10* 13.45* 16.19*   < > 18.23*  --   --  22.94*   IMMATURE GRANS (ABS) 0.37*  --  0.50* 0.31* 0.13* 0.09*  --   --   --   --   --    LYMPHS ABS 1.00  --  0.90 0.77 0.74 0.59*  --   --   --   --   --    MONOS ABS 0.74  --  1.33* 1.28* 0.99* 1.05*  --   --   --   --   --    EOS ABS 0.50* 0.44* 0.38 0.25 0.12 0.03   < > 0.00  --   --  0.00   MCV 91.6 91.1 92.7 92.1 92.3 91.3  --  93.0  --   --  92.7   PROCALCITONIN  --   --   --   --   --   --   --   --   --  11.96*  --    LACTATE  --   --   --   --   --   --   --   --  2.0  --  3.3*   PROTIME  --   --   --   --   --   --   --  20.2*  --   --   --     < > = values in this interval not displayed.         Lab 06/16/24  0408 06/15/24  0439 06/14/24  0859 06/14/24  0340 06/13/24  0401 06/12/24  0156 06/11/24  1427 06/11/24  0321 06/10/24  0305   SODIUM 136 133*  --  137 139 137  --  135* 134*   POTASSIUM 3.8 3.8  --  3.9 4.4 4.2  --  4.3 4.3   CHLORIDE 98 98  --  102 106 104  --  103 102   CO2 29.0 26.0  --  26.0 25.0 24.0  --  22.0 21.0*   ANION GAP 9.0 9.0  --  9.0 8.0 9.0  --  10.0 11.0   BUN 27* 23  --  22 23 29*  --  37* 33*   CREATININE 1.14 1.07  --  1.03 0.93 1.02  --  1.16 1.31*   EGFR 68.8 74.2  --  77.7 87.8 78.6  --  67.3 58.2*   GLUCOSE 110* 120*  --  107* 116* 114*  --  112* 122*   CALCIUM 8.2* 8.2*  --  8.3* 8.2* 8.0*  --  8.0* 7.6*   MAGNESIUM  --  1.9 1.9  --   --  1.8  --  2.2 1.5*   PHOSPHORUS  --   --   --   --   --   --  2.8 1.9* 2.5         Lab 06/09/24  2246   TOTAL PROTEIN 7.3   ALBUMIN 3.8   GLOBULIN 3.5   ALT (SGPT) 14   AST (SGOT) 19   BILIRUBIN 1.6*   ALK PHOS 111   LIPASE 20         Lab 06/10/24  0626 06/10/24  0428 06/10/24  0305   HSTROP T 18 17  --    PROTIME  --   --  20.2*   INR  --   --  1.66*                 Brief Urine Lab Results  (Last result in the past 365 days)        Color   Clarity   Blood   Leuk Est   Nitrite   Protein   CREAT   Urine HCG        06/10/24 2102 Hendricks    Clear   Trace   Negative   Negative   100 mg/dL (2+)                 Microbiology Results (last 10 days)       Procedure Component Value - Date/Time    Legionella Antigen, Urine - Urine, Urine, Clean Catch [[id number]]  (Normal) Collected: 06/10/24 0932    Lab Status: Final result Specimen: Urine, Clean Catch Updated: 06/10/24 1128     LEGIONELLA ANTIGEN, URINE Negative    Respiratory Culture - Sputum, Cough [[id number]] Collected: 06/10/24 0449    Lab Status: Final result Specimen: Sputum from Cough Updated: 06/12/24 0935     Respiratory Culture Moderate growth (3+) Normal respiratory jailyn. No S. aureus or Pseudomonas aeruginosa detected. Final report.     Gram Stain Many (4+) WBCs per low power field      Rare (1+) Epithelial cells per low power field      Moderate (3+) Mixed gram positive jailyn      Moderate (3+) Mixed gram negative jailyn      Mixed intracellular organisms suggestive of an aspiration event    MRSA Screen, PCR (Inpatient) - Swab, Nares [[id number]]  (Normal) Collected: 06/10/24 0417    Lab Status: Final result Specimen: Swab from Nares Updated: 06/10/24 0609     MRSA PCR No MRSA Detected    Narrative:      The negative predictive value of this diagnostic test is high and should only be used to consider de-escalating anti-MRSA therapy. A positive result may indicate colonization with MRSA and must be correlated clinically.    Respiratory Panel PCR w/COVID-19(SARS-CoV-2) LYNDA/BUCKY/ELVIRA/PAD/[facility]T In-House, NP Swab in UTM/VTM, 2 HR TAT - Swab, Nasopharynx [[id number]]  (Normal) Collected: 06/10/24 0417    Lab Status: Final result Specimen: Swab from Nasopharynx Updated: 06/10/24 0543     ADENOVIRUS, PCR Not Detected     Coronavirus 229E Not Detected     Coronavirus HKU1 Not Detected     Coronavirus NL63 Not Detected     Coronavirus OC43 Not Detected     COVID19 Not Detected     Human Metapneumovirus Not Detected     Human Rhinovirus/Enterovirus Not Detected     Influenza A PCR Not Detected     Influenza B  PCR Not Detected     Parainfluenza Virus 1 Not Detected     Parainfluenza Virus 2 Not Detected     Parainfluenza Virus 3 Not Detected     Parainfluenza Virus 4 Not Detected     RSV, PCR Not Detected     Bordetella pertussis pcr Not Detected     Bordetella parapertussis PCR Not Detected     Chlamydophila pneumoniae PCR Not Detected     Mycoplasma pneumo by PCR Not Detected    Narrative:      In the setting of a positive respiratory panel with a viral infection PLUS a negative procalcitonin without other underlying concern for bacterial infection, consider observing off antibiotics or discontinuation of antibiotics and continue supportive care. If the respiratory panel is positive for atypical bacterial infection (Bordetella pertussis, Chlamydophila pneumoniae, or Mycoplasma pneumoniae), consider antibiotic de-escalation to target atypical bacterial infection.    Blood Culture - Blood, Arm, Right [[id number]]  (Normal) Collected: 06/10/24 0122    Lab Status: Final result Specimen: Blood from Arm, Right Updated: 06/15/24 0146     Blood Culture No growth at 5 days    Blood Culture - Blood, Hand, Left [[id number]]  (Normal) Collected: 06/10/24 0114    Lab Status: Final result Specimen: Blood from Hand, Left Updated: 06/15/24 0146     Blood Culture No growth at 5 days            Hospital Course:   Patient was doing better  He is not requiring any oxygen and maintaining saturation in the 90s.  He has not had any recurrence of arrhythmia.  I confirmed with the telemetry staff breathing I got from the telemetry box.  Patient is maintaining sinus rhythm anywhere from 55-65.  I noted that he is on antiarrhythmic flecainide prior to admission.  Prior to this admission he was taking lisinopril.  Blood pressure trend reviewed.  Most recent systolic blood pressure 118/76.  I reviewed his home medications while at bedside and outlined below.  Patient is hemodynamically stable and appropriate for discharge.    This is a 73-year-old  "man who I saw for the first time on day 5 of hospitalization.  He presented with concern for sepsis and found with left lower lobe pneumonia.  Chest x-ray reviewed as outlined below.  He received antibiotics and has no further requirement for antibiotic moving forward.  In addition to this, he had episode of atrial fibrillation with rapid ventricular response.    I informed him of the echocardiogram result including the mildly elevated RV systolic pressure.  I mentioned this to him in context to the fluid retention he has.  I will continue him on diuretic with recommendation to have his chemistry followed up by his primary care provider.    He converted back to sinus rhythm.  I instructed him to follow-up with primary care provider and his primary cardiologist.    Note that most of his care was in intensive care unit.  Review of intensive care unit's note, patient has acute hypoxic respiratory failure secondary to left lower lobe pneumonia.  It is believed that the atrial fibrillation was precipitated by the pneumonia.  He had an abnormality in renal CT scan which urology had evaluated and signed off at.  The interested reader is referred to details in the consult report.            To address patient's hyponatremia, I him sodium chloride tablets in addition to diuretic.  Most recent chemistry showed sodium of 136, potassium 3.8, creatinine 1.14.    Physical Exam on Discharge:  /76 (BP Location: Right arm, Patient Position: Sitting)   Pulse 60   Temp 98.4 °F (36.9 °C) (Oral)   Resp 18   Ht 180.3 cm (71\")   Wt 99.7 kg (219 lb 12.8 oz)   SpO2 93%   BMI 30.66 kg/m²   Physical Exam  GEN: Awake, alert, interactive, in NAD  HEENT: Atraumatic, PERRLA, EOMI, Anicteric, Trachea midline  Lungs: CTAB, no wheezing/rales/rhonchi  Heart: RRR, +S1/s2, no rub  ABD: soft, nt/nd, +BS, no guarding/rebound  Extremities: atraumatic, no cyanosis, positive edema   skin: no rashes or lesions  Neuro: AAOx3, no focal " deficits  Psych: normal mood & affect    Condition on Discharge: Stable  Discharge Disposition:  Home or Self Care    Discharge Medications:     Discharge Medications        New Medications        Instructions Start Date   furosemide 40 MG tablet  Commonly known as: Lasix   40 mg, Oral, Daily             Changes to Medications        Instructions Start Date   metoprolol tartrate 100 MG tablet  Commonly known as: LOPRESSOR  What changed:   medication strength  how much to take  when to take this   100 mg, Oral, Every 12 Hours Scheduled             Continue These Medications        Instructions Start Date   ascorbic acid 500 MG tablet  Commonly known as: VITAMIN C   500 mg, Oral, Daily      atorvastatin 40 MG tablet  Commonly known as: LIPITOR   40 mg, Oral, Nightly      cholecalciferol 25 MCG (1000 UT) tablet  Commonly known as: VITAMIN D3   1,000 Units, Oral, Daily      Eliquis 5 MG tablet tablet  Generic drug: apixaban   5 mg, Oral, Every 12 Hours Scheduled      flecainide 100 MG tablet  Commonly known as: TAMBOCOR   100 mg, Oral, 2 Times Daily             Stop These Medications      coenzyme Q10 100 MG capsule     glucosamine-chondroitin 500-400 MG capsule capsule     lisinopril 10 MG tablet  Commonly known as: PRINIVIL,ZESTRIL     URINOZINC PROSTATE CLASSIC PO              This patient has current or prior documentation of an left ventricular ejection fraction (LVEF) of less than or equal to 40%.-Not applicable.    Results for orders placed during the hospital encounter of 06/09/24    Adult Transthoracic Echo Complete W/ Cont if Necessary Per Protocol    Interpretation Summary    Left ventricular ejection fraction appears to be 56 - 60%.    Left ventricular diastolic function was normal.    The right ventricular cavity is mild to moderately dilated.    The right atrial cavity is mild to moderately  dilated.    Estimated right ventricular systolic pressure from tricuspid regurgitation is mildly elevated (35-45  mmHg).    Mild to moderate pulmonary hypertension is present.    Moderate pulmonic valve regurgitation is present.        Discharge Diet:   Diet Instructions       Diet: Cardiac Diets; Healthy Heart (2-3 Na+); Thin (IDDSI 0)      Discharge Diet: Cardiac Diets    Cardiac Diet: Healthy Heart (2-3 Na+)    Fluid Consistency: Thin (IDDSI 0)            Activity at Discharge:   Activity Instructions       Gradually Increase Activity Until at Pre-Hospitalization Level              Follow-up Appointments:   Primary care provider within 1 week with basic metabolic panel and facilitation of care with cardiologist regarding atrial fibrillation.  I also recommend follow-up chest x-ray within 4 weeks to follow resolution of pneumonia      Test Results Pending at Discharge: None    Electronically signed by Bertram Ware MD, 06/16/24, 11:53 CDT.    Time: Greater than 30 minutes.          Electronically signed by Bertram Ware MD at 06/16/24 5667

## 2024-06-16 NOTE — DISCHARGE SUMMARY
"      [facility] Services  DISCHARGE SUMMARY       Date of Admission: 6/9/2024  Date of Discharge:  6/16/2024  Primary Care Physician: Provider, No Known    Presenting Problem/History of Present Illness:  \"Sepsis secondary to left-sided pneumonia\"  Final Discharge Diagnoses:  Active Hospital Problems    Diagnosis     **Sepsis     Pneumonia involving left lung     Chronic atrial fibrillation with rapid ventricular response     Abnormal CT of the abdomen     Acute respiratory failure with hypoxia    In addition to above mild to moderate pulmonary hypertension as described in echocardiogram  Fluid retention of lower extremities.  Hyponatremia probably related to diuretic effect versus fluid retention.    Consults:   Urologist  Intensivist admitted the patient and was transition to the hospitalist service  Procedures Performed: None    Pertinent Test Results:   Results for orders placed during the hospital encounter of 06/09/24    Adult Transthoracic Echo Complete W/ Cont if Necessary Per Protocol    Interpretation Summary    Left ventricular ejection fraction appears to be 56 - 60%.    Left ventricular diastolic function was normal.    The right ventricular cavity is mild to moderately dilated.    The right atrial cavity is mild to moderately  dilated.    Estimated right ventricular systolic pressure from tricuspid regurgitation is mildly elevated (35-45 mmHg).    Mild to moderate pulmonary hypertension is present.    Moderate pulmonic valve regurgitation is present.      Imaging Results (All)       Procedure Component Value Units Date/Time    XR Chest 1 View [[id number]] Collected: 06/11/24 1031     Updated: 06/11/24 1037    Narrative:      EXAMINATION: XR CHEST 1 VW- 6/11/2024 10:31 AM     HISTORY: pna; I48.91-Unspecified atrial fibrillation; A41.9-Sepsis,  unspecified organism; N17.9-Acute kidney failure, unspecified;  J18.9-Pneumonia, unspecified organism; " R09.02-Hypoxemia.     REPORT: A frontal view of the chest was obtained.     COMPARISON: CT chest without contrast Ramya 10, 2024.     There is extensive consolidation of the left lung, greatest at the lung  base with obscuration of the left mid diaphragm. There are air  bronchograms in the perihilar region. There is mild atelectasis in the  right lung base. Left heart margin is mostly obscured, there is no  obvious cardiomegaly or evidence of CHF. No pneumothorax is identified.  Underlying left pleural effusion cannot be excluded, though none is seen  on the CT.. The osseous structures are unremarkable.       Impression:      Extensive consolidating left-sided pneumonia.           This report was signed and finalized on 6/11/2024 10:33 AM by Dr. Scott Mitchell MD.       CT Abdomen Pelvis With Contrast [[id number]] Collected: 06/10/24 0607     Updated: 06/10/24 0816    Narrative:      EXAMINATION: CT ABDOMEN PELVIS W CONTRAST-      6/9/2024 10:15 PM     HISTORY: LLQ, left flank, NV; I48.91-Unspecified atrial fibrillation     In order to have a CT radiation dose as low as reasonably achievable  Automated Exposure Control was utilized for adjustment of the mA and/or  KV according to patient size.     Total DLP = 416.95 mGy.cm     The CT scan of the abdomen and pelvis is performed after intravenous  contrast enhancement.     Images are acquired in the axial plane and subsequent reconstruction in  coronal and sagittal planes.     There is no previous similar study for comparison.     The chest is separately reviewed and reported.     There is fatty infiltration of the liver. No focal intrinsic  abnormality. The spleen is normal with multiple small granulomas.     No radiopaque gallstones.     Pancreas is normal.     The adrenal glands bilaterally are normal.     There is moderate lobulation of renal contour bilaterally. There is mild  irregularity and focal bulge along the mid right lateral kidney which  may represent a  hypertrophied column of Solo or asymmetrical cortical  hypertrophy?. It measures approximately 1.8 cm in diameter. No  radiopaque calculi. There is mild ectasia of the collecting system  bilaterally involving the proximal ureters on both sides. There is  surrounding retroperitoneal fat infiltration. No calculi in either  ureter. Urinary bladder is poorly distended. No intrinsic abnormality.     Prostate is not enlarged. There is intrinsic calcification. There are  small metallic objects in the prostate with streak artifact. This may  represent radiotherapy seeds.     There is a small fat-containing umbilical hernia.     There is small hiatal hernia. Stomach and duodenum are unremarkable.  Small bowel is nondistended. Appendix is not visualized. No finding to  suggest appendicitis. Moderate gas and stool is seen in the colon. There  is diverticulosis of the colon. No evidence of diverticulitis.     Atheromatous change of the abdominal aorta and iliac arteries. No  aneurysmal dilatation.     There is no evidence of abdominal or pelvic lymphadenopathy.     Images reviewed in bone window show chronic degenerative changes of the  lumbar spine with mild dextroscoliosis. Small focus of bony sclerosis  involving the right iliac bone adjacent to the right sacroiliac joint  and similar finding in the adjacent sacrum may represent bone islands?.  Possibility of metastatic disease is not excluded.       Impression:      1. A mild symmetrical ectasia of the renal collecting system bilaterally  may represent a normal variation, chronic pyelonephritis or  vesicoureteral reflux disease?. No calculi. No finding to suggest  obstruction. This may be clinically correlated and further evaluated.  2. An apparent irregularity of the right renal parenchyma/cortex in the  midpole may represent a pseudotumor?. However possibility of a  neoplastic process may not be excluded. A follow-up contrast enhanced CT  or MR imaging of the abdomen  with renal mass protocol may be obtained.  3. Other nonacute findings as above.  4. The chest is separately reviewed and reported.     The above study was initially reviewed and reported by [facility]. The  above-mentioned discrepancy was reported to ER physician at 8:13 a.m.                                   This report was signed and finalized on 6/10/2024 8:13 AM by Dr. Muriel Dixon MD.       CT Chest Without Contrast Diagnostic [[id number]] Collected: 06/10/24 0600     Updated: 06/10/24 0723    Narrative:      EXAMINATION: CT CHEST WO CONTRAST DIAGNOSTIC-      6/10/2024 1:15 AM     HISTORY: left pna; I48.91-Unspecified atrial fibrillation; A41.9-Sepsis,  unspecified organism; N17.9-Acute kidney failure, unspecified;  J18.9-Pneumonia, unspecified organism; R09.02-Hypoxemia     In order to have a CT radiation dose as low as reasonably achievable  Automated Exposure Control was utilized for adjustment of the mA and/or  KV according to patient size.     Total DLP = 221.38 mGy.cm     The CT scan of the chest is performed without intravenous contrast  enhancement.     Images are acquired in the axial plane and subsequent reconstruction in  coronal and sagittal planes.     There is no previous similar study for comparison.     There are areas of consolidation with air bronchograms involving the  left upper lobe posteriorly and the left lower lobe.     A smaller infiltrate is seen in the right parahilar area and right lower  lobe.     There is a small bibasilar pleural effusion.     There are atelectatic changes in the right middle lobe.     The Central airway is patent. No endobronchial abnormality is noted.     There are a few calcified granulomas in the lungs bilaterally.     Limited visualized soft tissue of the neck are unremarkable.     The thyroid gland is suboptimally visualized and not well evaluated.  There is a probable nodule in the left lobe.     There is no axillary lymphadenopathy.     Atheromatous  change of thoracic aorta noted. No aneurysmal dilatation.     Moderate ectasia of the central pulmonary arteries represent pulmonary  arterial hypertension.     There are a few borderline prominent mediastinal lymph nodes. The  largest lymph node in the left hilum, level 5, measures 9 mm in short  axis. There are calcified granulomas in the mediastinum and right hilum.  The hilar lymph nodes are not well visualized or evaluated in this study  due to lack of contrast enhancement.     Atheromatous change of coronary arteries are noted.     There is moderate cardiomegaly.     There is a small hiatal hernia.     The limited visualized unenhanced abdomen is unremarkable and there is a  residual contrast in the renal collecting system bilaterally from a  previous contrast enhanced CT scan of the abdomen and pelvis.     Images reviewed in bone windows show chronic degenerative changes of the  thoracic spine. No acute bony abnormality.       Impression:      1. Bilateral lung infiltrate, significantly more extensive on the left,  represent an acute inflammatory/infectious process.  2. Small bibasilar pleural effusion.  3. Nonspecific borderline mediastinal lymphadenopathy probably represent  reactive adenopathy to the infiltrate.     The above study was initially reviewed and reported by [facility]. I do not  find any discrepancies.                                                        This report was signed and finalized on 6/10/2024 7:20 AM by Dr. Muriel Dixon MD.             LAB RESULTS:      Lab 06/16/24  0408 06/15/24  0439 06/14/24  0340 06/13/24  0401 06/12/24  0156 06/11/24  0321 06/10/24  0305 06/10/24  0305 06/10/24  0114 06/09/24  2246 06/09/24  2148   WBC 8.77 11.05* 12.89* 13.79* 15.47* 17.99*  --  19.60*  --   --  24.91*   HEMOGLOBIN 11.4* 11.0* 11.6* 11.1* 11.8* 12.2*  --  12.2*  --   --  13.1   HEMATOCRIT 34.7* 32.7* 35.5* 33.6* 36.1* 36.9*  --  37.1*  --   --  39.5   PLATELETS 357 323 299 257 427 835   --  175  --   --  200   NEUTROS ABS 6.10 9.39* 9.70* 11.10* 13.45* 16.19*   < > 18.23*  --   --  22.94*   IMMATURE GRANS (ABS) 0.37*  --  0.50* 0.31* 0.13* 0.09*  --   --   --   --   --    LYMPHS ABS 1.00  --  0.90 0.77 0.74 0.59*  --   --   --   --   --    MONOS ABS 0.74  --  1.33* 1.28* 0.99* 1.05*  --   --   --   --   --    EOS ABS 0.50* 0.44* 0.38 0.25 0.12 0.03   < > 0.00  --   --  0.00   MCV 91.6 91.1 92.7 92.1 92.3 91.3  --  93.0  --   --  92.7   PROCALCITONIN  --   --   --   --   --   --   --   --   --  11.96*  --    LACTATE  --   --   --   --   --   --   --   --  2.0  --  3.3*   PROTIME  --   --   --   --   --   --   --  20.2*  --   --   --     < > = values in this interval not displayed.         Lab 06/16/24  0408 06/15/24  0439 06/14/24  0859 06/14/24  0340 06/13/24  0401 06/12/24  0156 06/11/24  1427 06/11/24  0321 06/10/24  0305   SODIUM 136 133*  --  137 139 137  --  135* 134*   POTASSIUM 3.8 3.8  --  3.9 4.4 4.2  --  4.3 4.3   CHLORIDE 98 98  --  102 106 104  --  103 102   CO2 29.0 26.0  --  26.0 25.0 24.0  --  22.0 21.0*   ANION GAP 9.0 9.0  --  9.0 8.0 9.0  --  10.0 11.0   BUN 27* 23  --  22 23 29*  --  37* 33*   CREATININE 1.14 1.07  --  1.03 0.93 1.02  --  1.16 1.31*   EGFR 68.8 74.2  --  77.7 87.8 78.6  --  67.3 58.2*   GLUCOSE 110* 120*  --  107* 116* 114*  --  112* 122*   CALCIUM 8.2* 8.2*  --  8.3* 8.2* 8.0*  --  8.0* 7.6*   MAGNESIUM  --  1.9 1.9  --   --  1.8  --  2.2 1.5*   PHOSPHORUS  --   --   --   --   --   --  2.8 1.9* 2.5         Lab 06/09/24  2246   TOTAL PROTEIN 7.3   ALBUMIN 3.8   GLOBULIN 3.5   ALT (SGPT) 14   AST (SGOT) 19   BILIRUBIN 1.6*   ALK PHOS 111   LIPASE 20         Lab 06/10/24  0626 06/10/24  0428 06/10/24  0305   HSTROP T 18 17  --    PROTIME  --   --  20.2*   INR  --   --  1.66*                 Brief Urine Lab Results  (Last result in the past 365 days)        Color   Clarity   Blood   Leuk Est   Nitrite   Protein   CREAT   Urine HCG        06/10/24 2102 [place]    Clear   Trace   Negative   Negative   100 mg/dL (2+)                 Microbiology Results (last 10 days)       Procedure Component Value - Date/Time    Legionella Antigen, Urine - Urine, Urine, Clean Catch [[id number]]  (Normal) Collected: 06/10/24 0932    Lab Status: Final result Specimen: Urine, Clean Catch Updated: 06/10/24 1128     LEGIONELLA ANTIGEN, URINE Negative    Respiratory Culture - Sputum, Cough [[id number]] Collected: 06/10/24 0449    Lab Status: Final result Specimen: Sputum from Cough Updated: 06/12/24 0935     Respiratory Culture Moderate growth (3+) Normal respiratory jailyn. No S. aureus or Pseudomonas aeruginosa detected. Final report.     Gram Stain Many (4+) WBCs per low power field      Rare (1+) Epithelial cells per low power field      Moderate (3+) Mixed gram positive jailyn      Moderate (3+) Mixed gram negative jailyn      Mixed intracellular organisms suggestive of an aspiration event    MRSA Screen, PCR (Inpatient) - Swab, Nares [[id number]]  (Normal) Collected: 06/10/24 0417    Lab Status: Final result Specimen: Swab from Nares Updated: 06/10/24 0609     MRSA PCR No MRSA Detected    Narrative:      The negative predictive value of this diagnostic test is high and should only be used to consider de-escalating anti-MRSA therapy. A positive result may indicate colonization with MRSA and must be correlated clinically.    Respiratory Panel PCR w/COVID-19(SARS-CoV-2) LYNDA/[facility]CKY/[facility]A/[facility] In-House, NP Swab in UTM/VTM, 2 HR TAT - Swab, Nasopharynx [[id number]]  (Normal) Collected: 06/10/24 0417    Lab Status: Final result Specimen: Swab from Nasopharynx Updated: 06/10/24 0543     ADENOVIRUS, PCR Not Detected     Coronavirus 229E Not Detected     Coronavirus HKU1 Not Detected     Coronavirus NL63 Not Detected     Coronavirus OC43 Not Detected     COVID19 Not Detected     Human Metapneumovirus Not Detected     Human Rhinovirus/Enterovirus Not Detected     Influenza A PCR Not Detected     Influenza B  PCR Not Detected     Parainfluenza Virus 1 Not Detected     Parainfluenza Virus 2 Not Detected     Parainfluenza Virus 3 Not Detected     Parainfluenza Virus 4 Not Detected     RSV, PCR Not Detected     Bordetella pertussis pcr Not Detected     Bordetella parapertussis PCR Not Detected     Chlamydophila pneumoniae PCR Not Detected     Mycoplasma pneumo by PCR Not Detected    Narrative:      In the setting of a positive respiratory panel with a viral infection PLUS a negative procalcitonin without other underlying concern for bacterial infection, consider observing off antibiotics or discontinuation of antibiotics and continue supportive care. If the respiratory panel is positive for atypical bacterial infection (Bordetella pertussis, Chlamydophila pneumoniae, or Mycoplasma pneumoniae), consider antibiotic de-escalation to target atypical bacterial infection.    Blood Culture - Blood, Arm, Right [[id number]]  (Normal) Collected: 06/10/24 0122    Lab Status: Final result Specimen: Blood from Arm, Right Updated: 06/15/24 0146     Blood Culture No growth at 5 days    Blood Culture - Blood, Hand, Left [[id number]]  (Normal) Collected: 06/10/24 0114    Lab Status: Final result Specimen: Blood from Hand, Left Updated: 06/15/24 0146     Blood Culture No growth at 5 days            Hospital Course:   Patient was doing better  He is not requiring any oxygen and maintaining saturation in the 90s.  He has not had any recurrence of arrhythmia.  I confirmed with the telemetry staff breathing I got from the telemetry box.  Patient is maintaining sinus rhythm anywhere from 55-65.  I noted that he is on antiarrhythmic flecainide prior to admission.  Prior to this admission he was taking lisinopril.  Blood pressure trend reviewed.  Most recent systolic blood pressure 118/76.  I reviewed his home medications while at bedside and outlined below.  Patient is hemodynamically stable and appropriate for discharge.    This is a 73-year-old  "man who I saw for the first time on day 5 of hospitalization.  He presented with concern for sepsis and found with left lower lobe pneumonia.  Chest x-ray reviewed as outlined below.  He received antibiotics and has no further requirement for antibiotic moving forward.  In addition to this, he had episode of atrial fibrillation with rapid ventricular response.    I informed him of the echocardiogram result including the mildly elevated RV systolic pressure.  I mentioned this to him in context to the fluid retention he has.  I will continue him on diuretic with recommendation to have his chemistry followed up by his primary care provider.    He converted back to sinus rhythm.  I instructed him to follow-up with primary care provider and his primary cardiologist.    Note that most of his care was in intensive care unit.  Review of intensive care unit's note, patient has acute hypoxic respiratory failure secondary to left lower lobe pneumonia.  It is believed that the atrial fibrillation was precipitated by the pneumonia.  He had an abnormality in renal CT scan which urology had evaluated and signed off at.  The interested reader is referred to details in the consult report.            To address patient's hyponatremia, I him sodium chloride tablets in addition to diuretic.  Most recent chemistry showed sodium of 136, potassium 3.8, creatinine 1.14.    Physical Exam on Discharge:  /76 (BP Location: Right arm, Patient Position: Sitting)   Pulse 60   Temp 98.4 °F (36.9 °C) (Oral)   Resp 18   Ht 180.3 cm (71\")   Wt 99.7 kg (219 lb 12.8 oz)   SpO2 93%   BMI 30.66 kg/m²   Physical Exam  GEN: Awake, alert, interactive, in NAD  HEENT: Atraumatic, PERRLA, EOMI, Anicteric, Trachea midline  Lungs: CTAB, no wheezing/rales/rhonchi  Heart: RRR, +S1/s2, no rub  ABD: soft, nt/nd, +BS, no guarding/rebound  Extremities: atraumatic, no cyanosis, positive edema   skin: no rashes or lesions  Neuro: AAOx3, no focal " deficits  Psych: normal mood & affect    Condition on Discharge: Stable  Discharge Disposition:  Home or Self Care    Discharge Medications:     Discharge Medications        New Medications        Instructions Start Date   furosemide 40 MG tablet  Commonly known as: Lasix   40 mg, Oral, Daily             Changes to Medications        Instructions Start Date   metoprolol tartrate 100 MG tablet  Commonly known as: LOPRESSOR  What changed:   medication strength  how much to take  when to take this   100 mg, Oral, Every 12 Hours Scheduled             Continue These Medications        Instructions Start Date   ascorbic acid 500 MG tablet  Commonly known as: VITAMIN C   500 mg, Oral, Daily      atorvastatin 40 MG tablet  Commonly known as: LIPITOR   40 mg, Oral, Nightly      cholecalciferol 25 MCG (1000 UT) tablet  Commonly known as: VITAMIN D3   1,000 Units, Oral, Daily      Eliquis 5 MG tablet tablet  Generic drug: apixaban   5 mg, Oral, Every 12 Hours Scheduled      flecainide 100 MG tablet  Commonly known as: TAMBOCOR   100 mg, Oral, 2 Times Daily             Stop These Medications      coenzyme Q10 100 MG capsule     glucosamine-chondroitin 500-400 MG capsule capsule     lisinopril 10 MG tablet  Commonly known as: PRINIVIL,ZESTRIL     URINOZINC PROSTATE CLASSIC PO              This patient has current or prior documentation of an left ventricular ejection fraction (LVEF) of less than or equal to 40%.-Not applicable.    Results for orders placed during the hospital encounter of 06/09/24    Adult Transthoracic Echo Complete W/ Cont if Necessary Per Protocol    Interpretation Summary    Left ventricular ejection fraction appears to be 56 - 60%.    Left ventricular diastolic function was normal.    The right ventricular cavity is mild to moderately dilated.    The right atrial cavity is mild to moderately  dilated.    Estimated right ventricular systolic pressure from tricuspid regurgitation is mildly elevated (35-45  mmHg).    Mild to moderate pulmonary hypertension is present.    Moderate pulmonic valve regurgitation is present.        Discharge Diet:   Diet Instructions       Diet: Cardiac Diets; Healthy Heart (2-3 Na+); Thin (IDDSI 0)      Discharge Diet: Cardiac Diets    Cardiac Diet: Healthy Heart (2-3 Na+)    Fluid Consistency: Thin (IDDSI 0)            Activity at Discharge:   Activity Instructions       Gradually Increase Activity Until at Pre-Hospitalization Level              Follow-up Appointments:   Primary care provider within 1 week with basic metabolic panel and facilitation of care with cardiologist regarding atrial fibrillation.  I also recommend follow-up chest x-ray within 4 weeks to follow resolution of pneumonia      Test Results Pending at Discharge: None    Electronically signed by Bertram Ware MD, 06/16/24, 11:53 CDT.    Time: Greater than 30 minutes.

## 2024-06-19 ENCOUNTER — READMISSION MANAGEMENT (OUTPATIENT)
Dept: CALL CENTER | Facility: HOSPITAL | Age: 74
End: 2024-06-19
Payer: MEDICARE

## 2024-06-19 NOTE — OUTREACH NOTE
Sepsis Week 1 Survey      Flowsheet Row Responses   [facility] patient discharged from? [place]   Does the patient have one of the following disease processes/diagnoses(primary or secondary)? Sepsis   Week 1 attempt successful? Yes   Call start time 0938   Call end time 0945   Person spoke with today (if not patient) and relationship patient   Meds reviewed with patient/caregiver? Yes   Does the patient have all medications related to this admission filled (includes all antibiotics, inhalers, nebulizers,steroids,etc.) Yes   Prescription comments taking medication as prescribed.   Is the patient taking all medications as directed (includes completed medication regime)? Yes   Does the patient have a primary care provider?  Yes   Comments regarding PCP Seen pcp yesterday- pcp ordered chest xray.   Has home health visited the patient within 72 hours of discharge? N/A   Psychosocial issues? No   Did the patient receive a copy of their discharge instructions? Yes   Nursing interventions Reviewed instructions with patient   What is the patient's perception of their health status since discharge? Improving   Nursing interventions Nurse provided patient education   Is the patient/caregiver able to teach back TIME? T emperature - higher or lower than normal, I nfection - may have signs and symptoms of an infection, M ental Decline - confused, sleepy, difficult to arouse, E xtremely Ill - severe pain, discomfort, shortness of breath   Nursing interventions Nurse provided patient education   Is patient/caregiver able to teach back steps to recovery at home? Set small, achievable goals for return to baseline health, Rest and regain strength   Is the patient/caregiver able to teach back signs and symptoms of worsening condition: Fever, Rapid heart rate (>90), Shortness of breath/rapid respiratory rate, Altered mental status(confusion/coma)   Is the patient/caregiver able to teach back the hierarchy of who to call/visit for  symptoms/problems? PCP, Specialist, Home health nurse, Urgent Care, ED, 911 Yes   Week 1 call completed? Yes   Graduated Yes   Wrap up additional comments Patient reports doing well currently at [facility]. Seen pcp yesterday. No concerns or questions noted.   Call end time 0955            Annemarie JUDGE - Registered Nurse

## 2024-06-20 ENCOUNTER — HOSPITAL ENCOUNTER (OUTPATIENT)
Age: 74
Discharge: HOME | End: 2024-06-20
Payer: MEDICARE

## 2024-06-20 DIAGNOSIS — J18.9: Primary | ICD-10-CM

## 2024-06-20 PROCEDURE — 71046 X-RAY EXAM CHEST 2 VIEWS: CPT
